# Patient Record
Sex: MALE | Race: WHITE | Employment: OTHER | URBAN - METROPOLITAN AREA
[De-identification: names, ages, dates, MRNs, and addresses within clinical notes are randomized per-mention and may not be internally consistent; named-entity substitution may affect disease eponyms.]

---

## 2017-01-02 DIAGNOSIS — N18.30 CKD (CHRONIC KIDNEY DISEASE) STAGE 3, GFR 30-59 ML/MIN (H): ICD-10-CM

## 2017-01-02 DIAGNOSIS — Z12.5 SPECIAL SCREENING FOR MALIGNANT NEOPLASM OF PROSTATE: ICD-10-CM

## 2017-01-02 DIAGNOSIS — E78.1 HYPERTRIGLYCERIDEMIA: ICD-10-CM

## 2017-01-02 DIAGNOSIS — E78.5 HYPERLIPIDEMIA LDL GOAL <100: ICD-10-CM

## 2017-01-02 LAB
ALBUMIN SERPL-MCNC: 4 G/DL (ref 3.4–5)
ALP SERPL-CCNC: 82 U/L (ref 40–150)
ALT SERPL W P-5'-P-CCNC: 36 U/L (ref 0–70)
ANION GAP SERPL CALCULATED.3IONS-SCNC: 8 MMOL/L (ref 3–14)
AST SERPL W P-5'-P-CCNC: 20 U/L (ref 0–45)
BILIRUB SERPL-MCNC: 0.4 MG/DL (ref 0.2–1.3)
BUN SERPL-MCNC: 24 MG/DL (ref 7–30)
CALCIUM SERPL-MCNC: 9.2 MG/DL (ref 8.5–10.1)
CHLORIDE SERPL-SCNC: 105 MMOL/L (ref 94–109)
CHOLEST SERPL-MCNC: 184 MG/DL
CO2 SERPL-SCNC: 29 MMOL/L (ref 20–32)
CREAT SERPL-MCNC: 1.51 MG/DL (ref 0.66–1.25)
ERYTHROCYTE [DISTWIDTH] IN BLOOD BY AUTOMATED COUNT: 13.3 % (ref 10–15)
GFR SERPL CREATININE-BSD FRML MDRD: 46 ML/MIN/1.7M2
GLUCOSE SERPL-MCNC: 104 MG/DL (ref 70–99)
HCT VFR BLD AUTO: 41.8 % (ref 40–53)
HDLC SERPL-MCNC: 67 MG/DL
HGB BLD-MCNC: 14.1 G/DL (ref 13.3–17.7)
LDLC SERPL CALC-MCNC: 92 MG/DL
MCH RBC QN AUTO: 29.5 PG (ref 26.5–33)
MCHC RBC AUTO-ENTMCNC: 33.7 G/DL (ref 31.5–36.5)
MCV RBC AUTO: 87 FL (ref 78–100)
NONHDLC SERPL-MCNC: 117 MG/DL
PLATELET # BLD AUTO: 161 10E9/L (ref 150–450)
POTASSIUM SERPL-SCNC: 4.5 MMOL/L (ref 3.4–5.3)
PROT SERPL-MCNC: 7 G/DL (ref 6.8–8.8)
PSA SERPL-ACNC: 3.37 UG/L (ref 0–4)
RBC # BLD AUTO: 4.78 10E12/L (ref 4.4–5.9)
SODIUM SERPL-SCNC: 142 MMOL/L (ref 133–144)
TRIGL SERPL-MCNC: 125 MG/DL
WBC # BLD AUTO: 6.4 10E9/L (ref 4–11)

## 2017-01-02 PROCEDURE — 36415 COLL VENOUS BLD VENIPUNCTURE: CPT | Performed by: FAMILY MEDICINE

## 2017-01-02 PROCEDURE — G0103 PSA SCREENING: HCPCS | Performed by: FAMILY MEDICINE

## 2017-01-02 PROCEDURE — 85027 COMPLETE CBC AUTOMATED: CPT | Performed by: FAMILY MEDICINE

## 2017-01-02 PROCEDURE — 80053 COMPREHEN METABOLIC PANEL: CPT | Performed by: FAMILY MEDICINE

## 2017-01-02 PROCEDURE — 80061 LIPID PANEL: CPT | Performed by: FAMILY MEDICINE

## 2017-01-02 NOTE — PROGRESS NOTES
SUBJECTIVE:                                                    Driss Pope is a 66 year old male who presents to clinic today for the following health issues:    Pt mentioned about night sweats, heavy occasionally -not certain as to cause. Medications not likely to be a cause. Only occurring occasionally and not appearing to be a large problem.Discussed with the patient.    Labs all appear in good condition.    Hyperlipidemia Follow-Up      Rate your low fat/cholesterol diet?: good    Taking statin?  Yes, no muscle aches from statin    Other lipid medications/supplements?:  Krill oil      Hypertension Follow-up      Outpatient blood pressures are being checked at home.  Results are 105-120/70-80.    Low Salt Diet: no added salt     Chronic Kidney Disease Follow-up--follows with Dr. Fitzgerald       Current NSAID use?  Yes:   ibuprofen (Motrin)  Frequency: Once a week for hip pain       Amount of exercise or physical activity: 4-5 days/week for an average of 45-60 minutes    Problems taking medications regularly: No    Medication side effects: none    Diet: regular (no restrictions)        Problem list and histories reviewed & adjusted, as indicated.  Additional history: as documented    Problem list, Medication list, Allergies, and Medical/Social/Surgical histories reviewed in [facility] and updated as appropriate.    ROS:  C: NEGATIVE for fever, chills, change in weight  CONSTITUTIONAL: Over time has lost 45 pounds which is helped his lipids.  I: NEGATIVE for worrisome rashes, moles or lesions  E: NEGATIVE for vision changes or irritation  E/M: NEGATIVE for ear, mouth and throat problems  R: NEGATIVE for significant cough or SOB  B: NEGATIVE for masses, tenderness or discharge  CV: NEGATIVE for chest pain, palpitations or peripheral edema  CV: No cardiac symptoms. Blood pressure with improved control.  GI: NEGATIVE for nausea, abdominal pain, heartburn, or change in bowel habits   male : Maintaining good urine flow  "with current use of Flomax. No changes in current status.  M: NEGATIVE for significant arthralgias or myalgia  N: NEGATIVE for weakness, dizziness or paresthesias  E: NEGATIVE for temperature intolerance, skin/hair changes  H: NEGATIVE for bleeding problems  P: NEGATIVE for changes in mood or affect    OBJECTIVE:                                                    /66 mmHg  Pulse 68  Temp(Src) 96.6  F (35.9  C) (Temporal)  Resp 12  Ht 5' 8\" (1.727 m)  Wt 223 lb (101.152 kg)  BMI 33.91 kg/m2  Body mass index is 33.91 kg/(m^2).  GENERAL: healthy, alert and no distress  GENERAL: cooperative and over weight  EYES: Eyes grossly normal to inspection, extraocular movements - intact, and PERRL  HENT: ear canals- normal; TMs- normal; Nose- normal; Mouth- no ulcers, no lesions  NECK: no tenderness, no adenopathy, no asymmetry, no masses, no stiffness; thyroid- normal to palpation  NECK: no bruits  RESP: lungs clear to auscultation - no rales, no rhonchi, no wheezes  BREAST: no masses, no tenderness, no nipple discharge, no palpable axillary masses or adenopathy  CV: regular rates and rhythm, normal S1 S2, no S3 or S4 and no murmur, no click or rub -  ABDOMEN: soft, no tenderness, no  hepatosplenomegaly, no masses, normal bowel sounds  MS: extremities- no gross deformities noted, no edema  MS: peripheral pulses normal  SKIN: no suspicious lesions, no rashes  NEURO: strength and tone- normal, sensory exam- grossly normal, mentation- intact, speech- normal, reflexes- symmetric  BACK: no CVA tenderness, no paralumbar tenderness  - male: testicles- normal, no atrophy, no masses;  no inguinal hernias  Rectal- male: prostate symmetric w/o nodularity, no masses palpated and prostate 3+  PSYCH: Alert and oriented times 3; speech- coherent , normal rate and volume; able to articulate logical thoughts, able to abstract reason, no tangential thoughts, no hallucinations or delusions, affect- normal  LYMPHATICS: ant. cervical- " normal, post. cervical- normal, axillary- normal, supraclavicular- normal, inguinal- normal    Diagnostic test results:  Diagnostic Test Results:  Results for orders placed or performed in visit on 01/02/17   Prostate spec antigen screen   Result Value Ref Range    PSA 3.37 0 - 4 ug/L   CBC with platelets   Result Value Ref Range    WBC 6.4 4.0 - 11.0 10e9/L    RBC Count 4.78 4.4 - 5.9 10e12/L    Hemoglobin 14.1 13.3 - 17.7 g/dL    Hematocrit 41.8 40.0 - 53.0 %    MCV 87 78 - 100 fl    MCH 29.5 26.5 - 33.0 pg    MCHC 33.7 31.5 - 36.5 g/dL    RDW 13.3 10.0 - 15.0 %    Platelet Count 161 150 - 450 10e9/L   Lipid panel reflex to direct LDL   Result Value Ref Range    Cholesterol 184 <200 mg/dL    Triglycerides 125 <150 mg/dL    HDL Cholesterol 67 >39 mg/dL    LDL Cholesterol Calculated 92 <100 mg/dL    Non HDL Cholesterol 117 <130 mg/dL   Comprehensive metabolic panel   Result Value Ref Range    Sodium 142 133 - 144 mmol/L    Potassium 4.5 3.4 - 5.3 mmol/L    Chloride 105 94 - 109 mmol/L    Carbon Dioxide 29 20 - 32 mmol/L    Anion Gap 8 3 - 14 mmol/L    Glucose 104 (H) 70 - 99 mg/dL    Urea Nitrogen 24 7 - 30 mg/dL    Creatinine 1.51 (H) 0.66 - 1.25 mg/dL    GFR Estimate 46 (L) >60 mL/min/1.7m2    GFR Estimate If Black 56 (L) >60 mL/min/1.7m2    Calcium 9.2 8.5 - 10.1 mg/dL    Bilirubin Total 0.4 0.2 - 1.3 mg/dL    Albumin 4.0 3.4 - 5.0 g/dL    Protein Total 7.0 6.8 - 8.8 g/dL    Alkaline Phosphatase 82 40 - 150 U/L    ALT 36 0 - 70 U/L    AST 20 0 - 45 U/L        ASSESSMENT/PLAN:                                                      (I10) Hypertension, goal below 140/90  (primary encounter diagnosis)  Comment:  Blood pressure appears to be well maintained.  Plan: losartan (COZAAR) 50 MG tablet, metoprolol         (TOPROL-XL) 50 MG 24 hr tablet            (N18.3) CKD (chronic kidney disease) stage 3, GFR 30-59 ml/min  Comment: Stable except for slight increase in serum creatinine with this particular blood work. Is  followed by Dr. Fitzgerald for ongoing nephrology management.  Plan: losartan (COZAAR) 50 MG tablet, atorvastatin         (LIPITOR) 20 MG tablet            (E78.5) Hyperlipidemia LDL goal <100  Comment:  LDL continues to be at goal with current medications  Plan: atorvastatin (LIPITOR) 20 MG tablet            (E78.1) Hypertriglyceridemia  Comment: Much improved with overall weight loss  Plan:     (N40.1) Benign non-nodular prostatic hyperplasia with lower urinary tract symptoms  Comment: Stable with maximum dose of Flomax.  Plan:  No changes with a stable PSA    (Z23) Need for prophylactic vaccination and inoculation against influenza  Comment: Vaccination given today.  Plan: FLU VACCINE, INCREASED ANTIGEN, PRESV FREE, AGE        65+ [04149], ADMIN INFLUENZA[] (For         MEDICARE Patients ONLY)               Follow up with Provider - Recommend follow-up in 6 months with fasting blood studies. No major changes in medications at this time.   See Patient Instructions  The patient understood the rational for the diagnosis and treatment plan. All questions were answered to best of my ability and the patient's satisfaction.      Vinod Hercules MD  [facility]

## 2017-01-04 ENCOUNTER — OFFICE VISIT (OUTPATIENT)
Dept: FAMILY MEDICINE | Facility: CLINIC | Age: 67
End: 2017-01-04
Payer: COMMERCIAL

## 2017-01-04 VITALS
WEIGHT: 223 LBS | TEMPERATURE: 96.6 F | SYSTOLIC BLOOD PRESSURE: 126 MMHG | HEART RATE: 68 BPM | DIASTOLIC BLOOD PRESSURE: 66 MMHG | BODY MASS INDEX: 33.8 KG/M2 | HEIGHT: 68 IN | RESPIRATION RATE: 12 BRPM

## 2017-01-04 DIAGNOSIS — N18.30 CKD (CHRONIC KIDNEY DISEASE) STAGE 3, GFR 30-59 ML/MIN (H): ICD-10-CM

## 2017-01-04 DIAGNOSIS — I10 HYPERTENSION, GOAL BELOW 140/90: Primary | ICD-10-CM

## 2017-01-04 DIAGNOSIS — Z23 NEED FOR PROPHYLACTIC VACCINATION AND INOCULATION AGAINST INFLUENZA: ICD-10-CM

## 2017-01-04 DIAGNOSIS — E78.5 HYPERLIPIDEMIA LDL GOAL <100: ICD-10-CM

## 2017-01-04 DIAGNOSIS — E78.1 HYPERTRIGLYCERIDEMIA: ICD-10-CM

## 2017-01-04 DIAGNOSIS — N40.1 BENIGN NON-NODULAR PROSTATIC HYPERPLASIA WITH LOWER URINARY TRACT SYMPTOMS: ICD-10-CM

## 2017-01-04 PROCEDURE — 90662 IIV NO PRSV INCREASED AG IM: CPT | Performed by: FAMILY MEDICINE

## 2017-01-04 PROCEDURE — G0008 ADMIN INFLUENZA VIRUS VAC: HCPCS | Performed by: FAMILY MEDICINE

## 2017-01-04 PROCEDURE — 99214 OFFICE O/P EST MOD 30 MIN: CPT | Mod: 25 | Performed by: FAMILY MEDICINE

## 2017-01-04 RX ORDER — ATORVASTATIN CALCIUM 20 MG/1
20 TABLET, FILM COATED ORAL DAILY
Qty: 90 TABLET | Refills: 3 | Status: SHIPPED | OUTPATIENT
Start: 2017-01-04 | End: 2018-01-30

## 2017-01-04 RX ORDER — LOSARTAN POTASSIUM 50 MG/1
50 TABLET ORAL DAILY
Qty: 90 TABLET | Refills: 3 | Status: SHIPPED | OUTPATIENT
Start: 2017-01-04 | End: 2018-01-30

## 2017-01-04 RX ORDER — METOPROLOL SUCCINATE 50 MG/1
50 TABLET, EXTENDED RELEASE ORAL DAILY
Qty: 90 TABLET | Refills: 3 | Status: SHIPPED | OUTPATIENT
Start: 2017-01-04 | End: 2017-05-24 | Stop reason: DRUGHIGH

## 2017-01-04 ASSESSMENT — PAIN SCALES - GENERAL: PAINLEVEL: MODERATE PAIN (4)

## 2017-01-04 NOTE — PROGRESS NOTES
Prior to injection verified patient identity using patient's name and date of birth.    Injectable Influenza Immunization Documentation    1.  Is the person to be vaccinated sick today?  No    2. Does the person to be vaccinated have an allergy to eggs or to a component of the vaccine?  No    3. Has the person to be vaccinated today ever had a serious reaction to influenza vaccine in the past?  No    4. Has the person to be vaccinated ever had Guillain-Clarkson syndrome?  No     Form completed by Golden Knight MA

## 2017-01-04 NOTE — NURSING NOTE
"Chief Complaint   Patient presents with     RECHECK     Panel Management     Flu shot, Fall risk       Initial /66 mmHg  Pulse 68  Temp(Src) 96.6  F (35.9  C) (Temporal)  Resp 12  Ht 5' 8\" (1.727 m)  Wt 223 lb (101.152 kg)  BMI 33.91 kg/m2 Estimated body mass index is 33.91 kg/(m^2) as calculated from the following:    Height as of this encounter: 5' 8\" (1.727 m).    Weight as of this encounter: 223 lb (101.152 kg).  BP completed using cuff size: lloyd Knight MA    "

## 2017-01-04 NOTE — MR AVS SNAPSHOT
After Visit Summary   1/4/2017    Driss Pope    MRN: [id number]           Patient Information     Date Of Birth          1950        Visit Information        Provider Department      1/4/2017 8:40 AM Vinod Hercules MD [facility]        Today's Diagnoses     Hypertension, goal below 140/90    -  1     CKD (chronic kidney disease) stage 3, GFR 30-59 ml/min         Hyperlipidemia LDL goal <100         Hypertriglyceridemia         Benign non-nodular prostatic hyperplasia with lower urinary tract symptoms           Care Instructions    These are new changes to your current plan of care based on today's visit:    Medications stopped    Medications to be started    Change dose of this medication Tablet size changed as discussed   New treatments        Follow up appointments:    1.  FOLLOW UP WITH YOUR PRIMARY CARE PROVIDER: 6 month(s) for clinic visit with fasting blood work     Colonoscopy as planned in March 2017 as planned    Vinod Hercules MD              Follow-ups after your visit        Follow-up notes from your care team     Return in about 6 months (around 7/4/2017) for Routine Visit, Lab Work.      Your next 10 appointments already scheduled     Oct 10, 2017  9:00 AM   LAB with LAB FIRST FLOOR [facility] ([facility])    [address] [place] [id number] [phone]           Patient must bring picture ID.  Patient should be prepared to give a urine specimen  Please do not eat 10-12 hours before your appointment if you are coming in fasting for labs on lipids, cholesterol, or glucose (sugar).  Pregnant women should follow their Care Team instructions. Water with medications is okay. Do not drink coffee or other fluids.   If you have concerns about taking  your medications, please ask at office or if scheduling via F[employer] Skis, send a message by clicking on Secure Messaging, Message Your Care Team.            Oct 10,  "2017  9:30 AM   Return Visit with Rosalie Fitzgerald MD   [facility] ([facility])    [address] [address] [place] [id number] [phone]              Who to contact     If you have questions or need follow up information about today's clinic visit or your schedule please contact [facility] directly at [phone].  Normal or non-critical lab and imaging results will be communicated to you by Kruxhart, letter or phone within 4 business days after the clinic has received the results. If you do not hear from us within 7 days, please contact the clinic through Bebitost or phone. If you have a critical or abnormal lab result, we will notify you by phone as soon as possible.  Submit refill requests through [facility] or call your pharmacy and they will forward the refill request to us. Please allow 3 business days for your refill to be completed.          Additional Information About Your Visit        Kruxhar[employer]iya Information     [facility] gives you secure access to your electronic health record. If you see a primary care provider, you can also send messages to your care team and make appointments. If you have questions, please call your primary care clinic.  If you do not have a primary care provider, please call [phone] and they will assist you.        Care EveryWhere ID     This is your Care EveryWhere ID. This could be used by other organizations to access your [place] medical records  [id number]        Your Vitals Were     Pulse Temperature Respirations Height BMI (Body Mass Index)       68 96.6  F (35.9  C) (Temporal) 12 5' 8\" (1.727 m) 33.91 kg/m2        Blood Pressure from Last 3 Encounters:   01/04/17 126/66   12/09/16 132/75   10/11/16 130/76    Weight from Last 3 Encounters:   01/04/17 223 lb (101.152 kg)   10/11/16 217 lb (98.431 kg)   06/30/16 220 lb (99.791 kg)              Today, you had the following     No orders found for display         Today's " Medication Changes          These changes are accurate as of: 1/4/17  9:21 AM.  If you have any questions, ask your nurse or doctor.               These medicines have changed or have updated prescriptions.        Dose/Directions    atorvastatin 20 MG tablet   Commonly known as:  LIPITOR   This may have changed:  medication strength   Used for:  Hyperlipidemia LDL goal <100, CKD (chronic kidney disease) stage 3, GFR 30-59 ml/min   Changed by:  Vinod Hercules MD        Dose:  20 mg   Take 1 tablet (20 mg) by mouth daily   Quantity:  90 tablet   Refills:  3       losartan 50 MG tablet   Commonly known as:  COZAAR   This may have changed:  medication strength   Used for:  Hypertension, goal below 140/90, CKD (chronic kidney disease) stage 3, GFR 30-59 ml/min   Changed by:  Vinod Hercules MD        Dose:  50 mg   Take 1 tablet (50 mg) by mouth daily   Quantity:  90 tablet   Refills:  3       metoprolol 50 MG 24 hr tablet   Commonly known as:  TOPROL-XL   This may have changed:  medication strength   Used for:  Hypertension, goal below 140/90   Changed by:  Vinod Hercules MD        Dose:  50 mg   Take 1 tablet (50 mg) by mouth daily   Quantity:  90 tablet   Refills:  3            Where to get your medicines      These medications were sent to [facility] - [facility] [address]  [address] [address]     Phone:  [phone]    - atorvastatin 20 MG tablet  - losartan 50 MG tablet  - metoprolol 50 MG 24 hr tablet             Primary Care Provider Office Phone # Fax #    Vinod Hercules [phone]       [facility] [address] [address] [facility] [address]        Thank you!     Thank you for choosing [facility]  for your care. Our goal is always to provide you with excellent care. Hearing back from our patients is one way we can continue to improve our services. Please take a few minutes to complete the  written survey that you may receive in the mail after your visit with us. Thank you!             Your Updated Medication List - Protect others around you: Learn how to safely use, store and throw away your medicines at www.disposemymeds.org.          This list is accurate as of: 1/4/17  9:21 AM.  Always use your most recent med list.                   Brand Name Dispense Instructions for use    aspirin 325 MG EC tablet     100 tablet    Take 1 tablet by mouth daily.       atorvastatin 20 MG tablet    LIPITOR    90 tablet    Take 1 tablet (20 mg) by mouth daily       CIALIS 20 MG tablet   Generic drug:  tadalafil     9 tablet    Take 1/2 to 1 tablet by  mouth daily as needed,  never use with  nitroglycerin, terazosin,  or doxazosin       GLUCOSAMINE CHONDROITIN ADV PO      2-3 tablets daily.       Krill Oil 1000 MG Caps      Take by mouth daily       losartan 50 MG tablet    COZAAR    90 tablet    Take 1 tablet (50 mg) by mouth daily       metoprolol 50 MG 24 hr tablet    TOPROL-XL    90 tablet    Take 1 tablet (50 mg) by mouth daily       MULTIVITAL PO      Once daily.       order for DME     1 Units    Equipment being ordered: Home blood pressure monitoring device--patient choice of models. Recommend a model with an arm cuff for testing.       tamsulosin 0.4 MG capsule    FLOMAX    180 capsule    TAKE 2 CAPSULES EVERY DAY

## 2017-01-04 NOTE — PATIENT INSTRUCTIONS
These are new changes to your current plan of care based on today's visit:    Medications stopped    Medications to be started    Change dose of this medication Tablet size changed as discussed   New treatments        Follow up appointments:    1.  FOLLOW UP WITH YOUR PRIMARY CARE PROVIDER: 6 month(s) for clinic visit with fasting blood work     Colonoscopy as planned in March 2017 as planned    Vinod Hercules MD

## 2017-01-09 ENCOUNTER — TELEPHONE (OUTPATIENT)
Dept: FAMILY MEDICINE | Facility: CLINIC | Age: 67
End: 2017-01-09

## 2017-01-09 DIAGNOSIS — H10.33 ACUTE CONJUNCTIVITIS OF BOTH EYES: Primary | ICD-10-CM

## 2017-01-09 RX ORDER — POLYMYXIN B SULFATE AND TRIMETHOPRIM 1; 10000 MG/ML; [USP'U]/ML
1 SOLUTION OPHTHALMIC EVERY 4 HOURS
Qty: 1 BOTTLE | Refills: 0 | Status: SHIPPED | OUTPATIENT
Start: 2017-01-09 | End: 2017-01-13

## 2017-01-09 NOTE — TELEPHONE ENCOUNTER
Reason for call:  Symptom  Reason for call:  Patient reporting a symptom    Symptom or request: Pink eye ?     Duration (how long have symptoms been present): Started 1/8/17    Have you been treated for this before? No    Additional comments:Wanted to know if something could be sent to the    [email or web address] [id number] [place] [address] [place] [address]        Phone Number patient can be reached at:  Home number on file [phone] (home)    Best Time:  Anytime     Can we leave a detailed message on this number:  YES    Call taken on 1/9/2017 at 8:31 AM by Delilah Claros

## 2017-01-13 ENCOUNTER — OFFICE VISIT (OUTPATIENT)
Dept: FAMILY MEDICINE | Facility: CLINIC | Age: 67
End: 2017-01-13
Payer: COMMERCIAL

## 2017-01-13 ENCOUNTER — TELEPHONE (OUTPATIENT)
Dept: FAMILY MEDICINE | Facility: CLINIC | Age: 67
End: 2017-01-13

## 2017-01-13 VITALS
HEIGHT: 67 IN | DIASTOLIC BLOOD PRESSURE: 72 MMHG | TEMPERATURE: 97.3 F | RESPIRATION RATE: 16 BRPM | WEIGHT: 219.7 LBS | HEART RATE: 64 BPM | BODY MASS INDEX: 34.48 KG/M2 | SYSTOLIC BLOOD PRESSURE: 136 MMHG

## 2017-01-13 DIAGNOSIS — H10.33 ACUTE CONJUNCTIVITIS OF BOTH EYES, UNSPECIFIED ACUTE CONJUNCTIVITIS TYPE: Primary | ICD-10-CM

## 2017-01-13 PROCEDURE — 99213 OFFICE O/P EST LOW 20 MIN: CPT | Performed by: NURSE PRACTITIONER

## 2017-01-13 RX ORDER — SULFACETAMIDE SODIUM 100 MG/ML
1 SOLUTION/ DROPS OPHTHALMIC 4 TIMES DAILY
Qty: 2 ML | Refills: 0 | Status: SHIPPED | OUTPATIENT
Start: 2017-01-13 | End: 2017-01-20

## 2017-01-13 ASSESSMENT — PAIN SCALES - GENERAL: PAINLEVEL: NO PAIN (0)

## 2017-01-13 NOTE — PROGRESS NOTES
"  SUBJECTIVE:                                                    Driss Pope is a 66 year old male who presents to clinic today for the following health issues:    Eye(s) Problem     Onset: x 5 days     Description:   Location: bilateral  Pain: no  Redness: YES    Accompanying Signs & Symptoms:  Discharge/mattering: YES  Swelling: no  Visual changes: YES  Fever: no  Nasal Congestion: YES  Bothered by bright lights: no     History:   Trauma: no   Foreign body exposure: no    Precipitating factors:   Wearing contacts: Yes    Alleviating factors:  Improved by: none       Therapies Tried and outcome: polytrim      The patient reports he has been experiencing symptoms of conjunctivitis since Sunday 1/8. He reports mild nasal congestion and sore throat. He states the outsides of his eyes are itching. The patient states he has been dabbing his eyes due to drainage. The patient notes he has been taking Polytrim drops with no relief. He states his eyes continue to be \"filmy\" and he wakes up with crust in the corners of his eyes. The patient states he has had conjunctivitis in the past and has not had allergic reactions to other optic solutions.     Problem list and histories reviewed & adjusted, as indicated.  Additional history: as documented    Patient Active Problem List   Diagnosis     Hyperlipidemia LDL goal <100     Hypertriglyceridemia     CKD (chronic kidney disease) stage 3, GFR 30-59 ml/min     Advanced directives, counseling/discussion     Abnormal PSA     Lower extremity edema     ED (erectile dysfunction)     Benign non-nodular prostatic hyperplasia with lower urinary tract symptoms     Hypertension, goal below 140/90     Past Surgical History   Procedure Laterality Date     Joint replacement, hip rt/lt       Joint Replacement Hip RT/LT-both replced in the last 5 years     Arthroscopy knee rt/lt       Left knee     Tonsillectomy       Colonoscopy  10/4/2011     Procedure:COLONOSCOPY; Colonoscopy, screening; " Surgeon:HANNAH COMER; Location:[facility]     Colonoscopy  10/4/2011     Procedure:COMBINED COLONOSCOPY, REMOVE TUMOR/POLYP/LESION BY SNARE; Surgeon:HANNAH COMER; Location:[facility]     Colonoscopy with co2 insufflation N/A 12/9/2016     Procedure: COLONOSCOPY WITH CO2 INSUFFLATION;  Surgeon: Duane, William Charles, MD;  Location: [facility] OR     Colonoscopy N/A 12/9/2016     Procedure: COMBINED COLONOSCOPY, SINGLE OR MULTIPLE BIOPSY/POLYPECTOMY BY BIOPSY;  Surgeon: Duane, William Charles, MD;  Location: [facility] OR       Social History   Substance Use Topics     Smoking status: Never Smoker      Smokeless tobacco: Never Used     Alcohol Use: Yes      Comment: occasionally     Family History   Problem Relation Age of Onset     Alzheimer Disease Mother      Hypertension Mother      HEART DISEASE Father      CHF     Alzheimer Disease Paternal Grandmother      Alzheimer Disease Paternal Grandfather      CANCER Brother      esophageal cancer     Prostate Cancer Brother      Prostate Cancer Brother      Asthma No family hx of      C.A.D. No family hx of      CEREBROVASCULAR DISEASE No family hx of      Breast Cancer No family hx of      Cancer - colorectal No family hx of      Alcohol/Drug No family hx of      Allergies No family hx of      Anesthesia Reaction No family hx of      Arthritis No family hx of      Blood Disease No family hx of      Cardiovascular No family hx of      Circulatory No family hx of      Congenital Anomalies No family hx of      Connective Tissue Disorder No family hx of      Depression No family hx of      Endocrine Disease No family hx of      Genetic Disorder No family hx of      Eye Disorder No family hx of      GASTROINTESTINAL DISEASE No family hx of      Genitourinary Problems No family hx of      Gynecology No family hx of      Lipids No family hx of      Musculoskeletal Disorder No family hx of      Neurologic Disorder No family hx of      Obesity No family hx of      OSTEOPOROSIS No family hx  of      Psychotic Disorder No family hx of      Respiratory No family hx of      Thyroid Disease No family hx of      Family History Negative No family hx of      Hearing Loss No family hx of      Substance Abuse No family hx of      MENTAL ILLNESS No family hx of      Anxiety Disorder No family hx of      Hyperlipidemia No family hx of      DIABETES Sister      Unknown/Adopted Son      adopted         Current Outpatient Prescriptions   Medication Sig Dispense Refill     trimethoprim-polymyxin b (POLYTRIM) ophthalmic solution Place 1 drop into both eyes every 4 hours for 7 days 1 Bottle 0     losartan (COZAAR) 50 MG tablet Take 1 tablet (50 mg) by mouth daily 90 tablet 3     atorvastatin (LIPITOR) 20 MG tablet Take 1 tablet (20 mg) by mouth daily 90 tablet 3     metoprolol (TOPROL-XL) 50 MG 24 hr tablet Take 1 tablet (50 mg) by mouth daily 90 tablet 3     tamsulosin (FLOMAX) 0.4 MG 24 hr capsule TAKE 2 CAPSULES EVERY  capsule 1     Krill Oil 1000 MG CAPS Take by mouth daily       Misc Natural Products (GLUCOSAMINE CHONDROITIN ADV PO) 2-3 tablets daily.        Multiple Vitamins-Minerals (MULTIVITAL PO) Once daily.        CIALIS 20 MG tablet Take 1/2 to 1 tablet by  mouth daily as needed,  never use with  nitroglycerin, terazosin,  or doxazosin 9 tablet 0     order for DME Equipment being ordered: Home blood pressure monitoring device--patient choice of models. Recommend a model with an arm cuff for testing. 1 Units 0     aspirin 325 MG EC tablet Take 1 tablet by mouth daily. 100 tablet 3     Problem list, Medication list, Allergies, and Medical/Social/Surgical histories reviewed in EPIC and updated as appropriate.    ROS:  Constitutional, neuro, ENT, endocrine, pulmonary, cardiac, gastrointestinal, genitourinary, musculoskeletal, integument and psychiatric systems are negative, except as otherwise noted.    OBJECTIVE:                                                    /72 mmHg  Pulse 64  Temp(Src) 97.3  " F (36.3  C) (Temporal)  Resp 16  Ht 1.712 m (5' 7.4\")  Wt 99.655 kg (219 lb 11.2 oz)  BMI 34.00 kg/m2  Body mass index is 34 kg/(m^2).  GENERAL APPEARANCE: healthy, alert and no distress  EYES: bilateral conjunctival injection, slightly purulent drainage left greater than right, lids look swollen and dry, minimal tenderness   HENT: ear canals and TM's normal and nose and mouth without ulcers or lesions  NECK: no adenopathy, no asymmetry, masses, or scars and thyroid normal to palpation  RESP: lungs clear to auscultation - no rales, rhonchi or wheezes  CV: regular rates and rhythm, normal S1 S2, no S3 or S4 and no murmur, click or rub  SKIN: no suspicious lesions or rashes  NEURO: Normal strength and tone, mentation intact and speech normal    Diagnostic test results:  No results found for this or any previous visit (from the past 24 hour(s)).     ASSESSMENT/PLAN:                                                        ICD-10-CM    1. Acute conjunctivitis of both eyes, unspecified acute conjunctivitis type H10.33 sulfacetamide (BLEPH-10) 10 % ophthalmic solution            Discussed conjunctivitis symptoms and current use of Polytrim. Reviewed possible allergy to Polytrim. Decision made to cease using Polytrim and begin new ophthalmic solution. Order placed for sulfacetamide ophthalmic solution. Medication direction, dosage, and side effects discussed with patient. Advised patient to apply hydrocortisone to skin surrounding eyes, and avoid rubbing as irritation is likely coming from continued rubbing. Recommended patient use eye protection when outside.    Follow up with Provider - Monday if not improved      JOSUE Escamilla [facility]    This document serves as a record of the services and decisions personally performed and made by Carolin Mensha DNP. It was created on her behalf by Yajaira Ross, a trained medical scribe. The creation of this document is based the provider's statements to " the medical scribe.  Yajaira Ross 2:25 PM January 13, 2017

## 2017-01-13 NOTE — TELEPHONE ENCOUNTER
Patient is coming in today 1/13/17 seeing Carolin Mensah for ongoing pink eye at 2:20 pm.  Please triage.  Peg Rey, CMA

## 2017-01-13 NOTE — TELEPHONE ENCOUNTER
Left message on machine to call back. Patient already treated on 1/9/17, needs OV.  Carolin Muro, RN, BSN

## 2017-02-17 ENCOUNTER — DOCUMENTATION ONLY (OUTPATIENT)
Dept: VASCULAR SURGERY | Facility: CLINIC | Age: 67
End: 2017-02-17

## 2017-03-07 RX ORDER — LIDOCAINE 40 MG/G
CREAM TOPICAL
Status: CANCELLED | OUTPATIENT
Start: 2017-03-07

## 2017-03-07 RX ORDER — ONDANSETRON 2 MG/ML
4 INJECTION INTRAMUSCULAR; INTRAVENOUS
Status: CANCELLED | OUTPATIENT
Start: 2017-03-07

## 2017-03-15 ENCOUNTER — SURGERY (OUTPATIENT)
Age: 67
End: 2017-03-15

## 2017-03-15 ENCOUNTER — HOSPITAL ENCOUNTER (OUTPATIENT)
Facility: AMBULATORY SURGERY CENTER | Age: 67
Discharge: HOME | End: 2017-03-15
Attending: INTERNAL MEDICINE | Admitting: INTERNAL MEDICINE
Payer: COMMERCIAL

## 2017-03-15 VITALS
RESPIRATION RATE: 16 BRPM | TEMPERATURE: 97 F | OXYGEN SATURATION: 92 % | DIASTOLIC BLOOD PRESSURE: 89 MMHG | SYSTOLIC BLOOD PRESSURE: 137 MMHG

## 2017-03-15 LAB — COLONOSCOPY: NORMAL

## 2017-03-15 PROCEDURE — 88305 TISSUE EXAM BY PATHOLOGIST: CPT | Performed by: INTERNAL MEDICINE

## 2017-03-15 PROCEDURE — 45385 COLONOSCOPY W/LESION REMOVAL: CPT

## 2017-03-15 PROCEDURE — G8907 PT DOC NO EVENTS ON DISCHARG: HCPCS

## 2017-03-15 PROCEDURE — G8918 PT W/O PREOP ORDER IV AB PRO: HCPCS

## 2017-03-15 RX ORDER — FENTANYL CITRATE 50 UG/ML
INJECTION, SOLUTION INTRAMUSCULAR; INTRAVENOUS PRN
Status: DISCONTINUED | OUTPATIENT
Start: 2017-03-15 | End: 2017-03-15 | Stop reason: HOSPADM

## 2017-03-15 RX ADMIN — FENTANYL CITRATE 100 MCG: 50 INJECTION, SOLUTION INTRAMUSCULAR; INTRAVENOUS at 08:34

## 2017-03-16 LAB — COPATH REPORT: NORMAL

## 2017-03-25 DIAGNOSIS — N40.1 BENIGN NON-NODULAR PROSTATIC HYPERPLASIA WITH LOWER URINARY TRACT SYMPTOMS: ICD-10-CM

## 2017-03-27 RX ORDER — TAMSULOSIN HYDROCHLORIDE 0.4 MG/1
CAPSULE ORAL
Qty: 180 CAPSULE | Refills: 2 | Status: SHIPPED | OUTPATIENT
Start: 2017-03-27 | End: 2018-01-30

## 2017-03-27 NOTE — TELEPHONE ENCOUNTER
Prescription approved per [facility] Refill Protocol.  Adam Penny RN

## 2017-03-27 NOTE — TELEPHONE ENCOUNTER
Tamsulosin 0.4 mg         Last Written Prescription Date: 11/4/2016  Last Fill Quantity: 180, # refills: 1    Last Office Visit with G, P or [facility] prescribing provider:  1/13/2017     Future Office Visit:      BP Readings from Last 3 Encounters:   03/15/17 137/89   01/13/17 136/72   01/04/17 126/66

## 2017-05-22 NOTE — PROGRESS NOTES
"  SUBJECTIVE:                                                    Driss Pope is a 67 year old male who presents to clinic today for the following health issues:  Pt stopped blood pressure medication 6 weeks ago due to low blood pressures and pulse. Has been running 108-130s/57-80s.     Joint Pain--       This patient has had persistent pain over the last month overlying the right greater trochanteric area of his right hip. He has had bilateral hip replacement surgeries. Pain is noted with pressure on the greater trochanteric bursa. Radiation into the surrounding area noted but no suggestion of radiculopathy or lower back pain. He notices a good deal of pain when he has a twisting motion on his right hip, specifically was playing golf. No paresthesias.        Onset: x 1 month    Description:   Location: right hip  Character: \"tender\", throbbing, stabbing at times    Intensity: 8/10 at worst    Progression of Symptoms: depends on activity    Accompanying Signs & Symptoms:  Other symptoms: none   History:   Previous similar pain: YES      Precipitating factors:   Trauma or overuse: no     Alleviating factors:  Improved by: rest, Aspercreme, Advil       Therapies Tried and outcome: tylenol, Advil, Aspercreme      Hypertension Follow-up      Outpatient blood pressures are being checked at home.  Results are under 125/80 and many times near 110 systolic. Patient was having some mild orthostatic symptoms at home. He discontinued Metoprolol due to the low blood pressure and bradycardia.. Blood pressures and then stable for the last 6 weeks.    Low Salt Diet: no added salt       Problem list and histories reviewed & adjusted, as indicated.  Additional history: as documented        Reviewed and updated as needed this visit by clinical staff       Reviewed and updated as needed this visit by Provider         ROS:   ROS: 10 point ROS neg other than the symptoms noted above in the HPI.      OBJECTIVE:                         " "                           /64 (BP Location: Left arm, Patient Position: Chair, Cuff Size: Adult Large)  Pulse 54  Temp 97.7  F (36.5  C) (Temporal)  Resp 14  Ht 5' 7.6\" (1.717 m)  Wt 224 lb 4.8 oz (101.7 kg)  BMI 34.51 kg/m2  Body mass index is 34.51 kg/(m^2).  GENERAL: healthy, alert and no distress  HENT: ear canals- normal; TMs- normal; Nose- normal; Mouth- no ulcers, no lesions  NECK: no tenderness, no adenopathy, no asymmetry, no masses, no stiffness; thyroid- normal to palpation  RESP: lungs clear to auscultation - no rales, no rhonchi, no wheezes  CV: regular rates and rhythm, normal S1 S2, no S3 or S4 and no murmur, no click or rub -  ABDOMEN: soft, no tenderness, no  hepatosplenomegaly, no masses, normal bowel sounds  MS: Tenderness noted over the right greater trochanter without soft tissue swelling. Range of motion of the hip appears normal. Circulatory status normal. No tenderness or positive findings in the lumbar spine.  SKIN: no suspicious lesions, no rashes  NEURO: strength and tone- normal, sensory exam- grossly normal, mentation- intact, speech- normal, reflexes- symmetric  PSYCH: Alert and oriented times 3; speech- coherent , normal rate and volume; able to articulate logical thoughts, able to abstract reason, no tangential thoughts, no hallucinations or delusions, affect- normal    Diagnostic test results:  Diagnostic Test Results:  none      ASSESSMENT/PLAN:                                                    1. Trochanteric bursitis of right hip    We'll try a short course of oral steroids to reduce inflammation. If not responding, he should follow-up with his orthopedic specialist for possible injection and assessment of the hip prosthesis.  - prednisone (DELTASONE) 20 MG tablet; Take 3 tabs (60 mg) by mouth daily x 3 days, 2 tabs (40 mg) daily x 3 days, 1 tab (20 mg) daily x 3 days, then 1/2 tab (10 mg) x 3 days.  Dispense: 20 tablet; Refill: 0      Follow up with Provider - " Follow-up in mid July for scheduled follow-up and monitoring her renal status and blood pressure.   See Patient Instructions    The patient understood the rational for the diagnosis and treatment plan. All questions were answered to best of my ability and the patient's satisfaction.      Vinod Hercules MD  [facility]

## 2017-05-24 ENCOUNTER — OFFICE VISIT (OUTPATIENT)
Dept: FAMILY MEDICINE | Facility: CLINIC | Age: 67
End: 2017-05-24
Payer: MEDICARE

## 2017-05-24 VITALS
DIASTOLIC BLOOD PRESSURE: 64 MMHG | TEMPERATURE: 97.7 F | RESPIRATION RATE: 14 BRPM | HEIGHT: 68 IN | HEART RATE: 54 BPM | WEIGHT: 224.3 LBS | SYSTOLIC BLOOD PRESSURE: 132 MMHG | BODY MASS INDEX: 33.99 KG/M2

## 2017-05-24 DIAGNOSIS — M70.61 TROCHANTERIC BURSITIS OF RIGHT HIP: Primary | ICD-10-CM

## 2017-05-24 PROCEDURE — 99213 OFFICE O/P EST LOW 20 MIN: CPT | Performed by: FAMILY MEDICINE

## 2017-05-24 RX ORDER — PREDNISONE 20 MG/1
TABLET ORAL
Qty: 20 TABLET | Refills: 0 | Status: SHIPPED | OUTPATIENT
Start: 2017-05-24 | End: 2017-10-10

## 2017-05-24 ASSESSMENT — PAIN SCALES - GENERAL: PAINLEVEL: MILD PAIN (3)

## 2017-05-24 NOTE — PATIENT INSTRUCTIONS
These are new changes to your current plan of care based on today's visit:    Medications stopped    Medications to be started Prednisone tapering course as planned   Change dose of this medication    New treatments        Follow up appointments:    1.  FOLLOW UP WITH YOUR PRIMARY CARE PROVIDER: 6 week(s) for hypertension follow up with fasting blood work     2. FOLLOW UP WITH SPECIALIST: Orthopedist if the hip does not improve    Vinod Hercules MD

## 2017-05-24 NOTE — MR AVS SNAPSHOT
After Visit Summary   5/24/2017    Driss Ppoe    MRN: [id number]           Patient Information     Date Of Birth          1950        Visit Information        Provider Department      5/24/2017 2:40 PM Vinod Hercules MD [facility]        Today's Diagnoses     Trochanteric bursitis of right hip    -  1      Care Instructions    These are new changes to your current plan of care based on today's visit:    Medications stopped    Medications to be started Prednisone tapering course as planned   Change dose of this medication    New treatments        Follow up appointments:    1.  FOLLOW UP WITH YOUR PRIMARY CARE PROVIDER: 6 week(s) for hypertension follow up with fasting blood work     2. FOLLOW UP WITH SPECIALIST: Orthopedist if the hip does not improve    Vinod Hercules MD                Follow-ups after your visit        Follow-up notes from your care team     Return in about 6 weeks (around 7/5/2017) for Routine Visit, Lab Work.      Your next 10 appointments already scheduled     Oct 10, 2017  9:00 AM CDT   LAB with [facility])    [address] [place] [id number] [phone]           Patient must bring picture ID.  Patient should be prepared to give a urine specimen  Please do not eat 10-12 hours before your appointment if you are coming in fasting for labs on lipids, cholesterol, or glucose (sugar).  Pregnant women should follow their Care Team instructions. Water with medications is okay. Do not drink coffee or other fluids.   If you have concerns about taking  your medications, please ask at office or if scheduling via [email or web address], send a message by clicking on Secure Messaging, Message Your Care Team.            Oct 10, 2017  9:30 AM CDT   Return Visit with Rosalie Fitzgerald MD   [facility])    [id number] [place] [address]  [phone]              Who to contact     If you have questions or need follow up information about today's clinic visit or your schedule please contact [facility] directly at [phone].  Normal or non-critical lab and imaging results will be communicated to you by OpenTrusthart, letter or phone within 4 business days after the clinic has received the results. If you do not hear from us within 7 days, please contact the clinic through OpenTrusthart or phone. If you have a critical or abnormal lab result, we will notify you by phone as soon as possible.  Submit refill requests through [employer] or call your pharmacy and they will forward the refill request to us. Please allow 3 business days for your refill to be completed.          Additional Information About Your Visit        OpenTrustharSnapUp Information     [employer] gives you secure access to your electronic health record. If you see a primary care provider, you can also send messages to your care team and make appointments. If you have questions, please call your primary care clinic.  If you do not have a primary care provider, please call [phone] and they will assist you.        Care EveryWhere ID     This is your Care EveryWhere ID. This could be used by other organizations to access your [facility] medical records  [id number]        Your Vitals Were     Pulse Temperature Respirations Height BMI (Body Mass Index)       54 97.7  F (36.5  C) (Temporal) 14 5' 7.6\" (1.717 m) 34.51 kg/m2        Blood Pressure from Last 3 Encounters:   05/24/17 132/64   03/15/17 137/89   01/13/17 136/72    Weight from Last 3 Encounters:   05/24/17 224 lb 4.8 oz (101.7 kg)   01/13/17 219 lb 11.2 oz (99.7 kg)   01/04/17 223 lb (101.2 kg)              Today, you had the following     No orders found for display         Today's Medication Changes          These changes are accurate as of: 5/24/17  3:19 PM.  If you have any questions, ask your nurse or doctor.             "   Start taking these medicines.        Dose/Directions    predniSONE 20 MG tablet   Commonly known as:  DELTASONE   Used for:  Trochanteric bursitis of right hip   Started by:  Vinod Hercules MD        Take 3 tabs (60 mg) by mouth daily x 3 days, 2 tabs (40 mg) daily x 3 days, 1 tab (20 mg) daily x 3 days, then 1/2 tab (10 mg) x 3 days.   Quantity:  20 tablet   Refills:  0         Stop taking these medicines if you haven't already. Please contact your care team if you have questions.     metoprolol 50 MG 24 hr tablet   Commonly known as:  TOPROL-XL   Stopped by:  Vinod Hercules MD                Where to get your medicines      These medications were sent to [facility] [id number] [place] [id number]     Phone:  [phone]     predniSONE 20 MG tablet                Primary Care Provider Office Phone # Fax #    Vinod Hercules [phone]       [facility] [id number] [place] [id number]        Thank you!     Thank you for choosing [facility]  for your care. Our goal is always to provide you with excellent care. Hearing back from our patients is one way we can continue to improve our services. Please take a few minutes to complete the written survey that you may receive in the mail after your visit with us. Thank you!             Your Updated Medication List - Protect others around you: Learn how to safely use, store and throw away your medicines at www.disposemymeds.org.          This list is accurate as of: 5/24/17  3:19 PM.  Always use your most recent med list.                   Brand Name Dispense Instructions for use    aspirin 325 MG EC tablet     100 tablet    Take 1 tablet by mouth daily.       atorvastatin 20 MG tablet    LIPITOR    90 tablet    Take 1 tablet (20 mg) by mouth daily       CIALIS 20 MG tablet   Generic drug:  tadalafil     9 tablet    Take 1/2 to 1  tablet by  mouth daily as needed,  never use with  nitroglycerin, terazosin,  or doxazosin       GLUCOSAMINE CHONDROITIN ADV PO      2-3 tablets daily.       Krill Oil 1000 MG Caps      Take by mouth daily       losartan 50 MG tablet    COZAAR    90 tablet    Take 1 tablet (50 mg) by mouth daily       MULTIVITAL PO      Once daily.       order for DME     1 Units    Equipment being ordered: Home blood pressure monitoring device--patient choice of models. Recommend a model with an arm cuff for testing.       predniSONE 20 MG tablet    DELTASONE    20 tablet    Take 3 tabs (60 mg) by mouth daily x 3 days, 2 tabs (40 mg) daily x 3 days, 1 tab (20 mg) daily x 3 days, then 1/2 tab (10 mg) x 3 days.       tamsulosin 0.4 MG capsule    FLOMAX    180 capsule    TAKE 2 CAPSULES EVERY DAY

## 2017-05-24 NOTE — NURSING NOTE
"Chief Complaint   Patient presents with     Bursitis     right hip pain     Panel Management     UTD       Initial /64 (BP Location: Left arm, Patient Position: Chair, Cuff Size: Adult Large)  Pulse 54  Temp 97.7  F (36.5  C) (Temporal)  Resp 14  Ht 5' 7.6\" (1.717 m)  Wt 224 lb 4.8 oz (101.7 kg)  BMI 34.51 kg/m2 Estimated body mass index is 34.51 kg/(m^2) as calculated from the following:    Height as of this encounter: 5' 7.6\" (1.717 m).    Weight as of this encounter: 224 lb 4.8 oz (101.7 kg).  Medication Reconciliation: complete  Prachi Newman CMA (AAMA)    "

## 2017-09-26 DIAGNOSIS — N18.30 CKD (CHRONIC KIDNEY DISEASE) STAGE 3, GFR 30-59 ML/MIN (H): Primary | ICD-10-CM

## 2017-10-10 ENCOUNTER — OFFICE VISIT (OUTPATIENT)
Dept: NEPHROLOGY | Facility: CLINIC | Age: 67
End: 2017-10-10
Payer: COMMERCIAL

## 2017-10-10 VITALS
SYSTOLIC BLOOD PRESSURE: 139 MMHG | BODY MASS INDEX: 36.73 KG/M2 | WEIGHT: 238.7 LBS | HEART RATE: 65 BPM | OXYGEN SATURATION: 97 % | DIASTOLIC BLOOD PRESSURE: 92 MMHG

## 2017-10-10 DIAGNOSIS — R60.0 LOWER EXTREMITY EDEMA: ICD-10-CM

## 2017-10-10 DIAGNOSIS — I10 HYPERTENSION, GOAL BELOW 140/90: ICD-10-CM

## 2017-10-10 DIAGNOSIS — N18.30 CKD (CHRONIC KIDNEY DISEASE) STAGE 3, GFR 30-59 ML/MIN (H): Primary | ICD-10-CM

## 2017-10-10 DIAGNOSIS — N18.30 CKD (CHRONIC KIDNEY DISEASE) STAGE 3, GFR 30-59 ML/MIN (H): ICD-10-CM

## 2017-10-10 LAB
ALBUMIN SERPL-MCNC: 4 G/DL (ref 3.4–5)
ANION GAP SERPL CALCULATED.3IONS-SCNC: 6 MMOL/L (ref 3–14)
BUN SERPL-MCNC: 25 MG/DL (ref 7–30)
CALCIUM SERPL-MCNC: 9.3 MG/DL (ref 8.5–10.1)
CHLORIDE SERPL-SCNC: 108 MMOL/L (ref 94–109)
CO2 SERPL-SCNC: 29 MMOL/L (ref 20–32)
CREAT SERPL-MCNC: 1.34 MG/DL (ref 0.66–1.25)
CREAT UR-MCNC: 107 MG/DL
GFR SERPL CREATININE-BSD FRML MDRD: 53 ML/MIN/1.7M2
GLUCOSE SERPL-MCNC: 96 MG/DL (ref 70–99)
HGB BLD-MCNC: 14.2 G/DL (ref 13.3–17.7)
PHOSPHATE SERPL-MCNC: 3 MG/DL (ref 2.5–4.5)
POTASSIUM SERPL-SCNC: 4.4 MMOL/L (ref 3.4–5.3)
PROT UR-MCNC: 0.1 G/L
PROT/CREAT 24H UR: 0.09 G/G CR (ref 0–0.2)
PTH-INTACT SERPL-MCNC: 74 PG/ML (ref 12–72)
SODIUM SERPL-SCNC: 143 MMOL/L (ref 133–144)

## 2017-10-10 PROCEDURE — 85018 HEMOGLOBIN: CPT | Performed by: INTERNAL MEDICINE

## 2017-10-10 PROCEDURE — 83970 ASSAY OF PARATHORMONE: CPT | Performed by: INTERNAL MEDICINE

## 2017-10-10 PROCEDURE — 84156 ASSAY OF PROTEIN URINE: CPT | Performed by: INTERNAL MEDICINE

## 2017-10-10 PROCEDURE — 99214 OFFICE O/P EST MOD 30 MIN: CPT | Performed by: INTERNAL MEDICINE

## 2017-10-10 PROCEDURE — 80069 RENAL FUNCTION PANEL: CPT | Performed by: INTERNAL MEDICINE

## 2017-10-10 PROCEDURE — 36415 COLL VENOUS BLD VENIPUNCTURE: CPT | Performed by: INTERNAL MEDICINE

## 2017-10-10 NOTE — NURSING NOTE
"Driss Pope's goals for this visit include: CC  He requests these members of his care team be copied on today's visit information: No    PCP: Vinod Hercules    Referring Provider:  No referring provider defined for this encounter.    Chief Complaint   Patient presents with     RECHECK       Initial There were no vitals taken for this visit. Estimated body mass index is 34.51 kg/(m^2) as calculated from the following:    Height as of 5/24/17: 1.717 m (5' 7.6\").    Weight as of 5/24/17: 101.7 kg (224 lb 4.8 oz).  Medication Reconciliation: complete  "

## 2017-10-10 NOTE — PROGRESS NOTES
10/10/2017     CC: Chronic Kidney Disease (CKD)    HPI: Driss Pope is a 67 year old male who presents for follow-up of stage 3 CKD thought due to hypertension. He is here for his one year follow-up. He continues to be active with his dancing - no edema concerns at this time.   His creatinine has been 1.29-1.5 on in the past year and currently stable at 1.34 today with a correlating GFR of 53. He underwent colonoscopy in December 2016 otherwise has not had any changes in his medical history. She reports having some aches and pains related to dancing on review of systems. He does use Advil very occasionally when needed for this pain but typically tries to use Tylenol. He thinks he may have had a gout episode last month but denies any pain or concerns at this time. She is planning cataract surgery for the end of the month. Blood pressure at home is typically 130s over 70s. He has weaned himself down on some of his blood pressure meds in the past year and finds that his blood pressure is stable at this time.    Allergies   Allergen Reactions     Lisinopril Cough     Persistent cough with Lisinopril         Current Outpatient Prescriptions on File Prior to Visit:  tamsulosin (FLOMAX) 0.4 MG capsule TAKE 2 CAPSULES EVERY DAY   losartan (COZAAR) 50 MG tablet Take 1 tablet (50 mg) by mouth daily   atorvastatin (LIPITOR) 20 MG tablet Take 1 tablet (20 mg) by mouth daily   CIALIS 20 MG tablet Take 1/2 to 1 tablet by  mouth daily as needed,  never use with  nitroglycerin, terazosin,  or doxazosin   Krill Oil 1000 MG CAPS Take by mouth daily   aspirin 325 MG EC tablet Take 1 tablet by mouth daily.   Misc Natural Products (GLUCOSAMINE CHONDROITIN ADV PO) 2-3 tablets daily.    Multiple Vitamins-Minerals (MULTIVITAL PO) Once daily.      No current facility-administered medications on file prior to visit.     Past Medical History:   Diagnosis Date     CKD (chronic kidney disease)      Hyperlipidemia LDL goal < 100       Hypertension goal BP (blood pressure) < 130/80      Hypertriglyceridemia      Obesity      Osteoarthritis        Social History   Substance Use Topics     Smoking status: Never Smoker     Smokeless tobacco: Never Used     Alcohol use Yes      Comment: occasionally         Family History   Problem Relation Age of Onset     Alzheimer Disease Mother      Hypertension Mother      HEART DISEASE Father      CHF     Alzheimer Disease Paternal Grandmother      Alzheimer Disease Paternal Grandfather      CANCER Brother      esophageal cancer     Prostate Cancer Brother      Prostate Cancer Brother      DIABETES Sister      Unknown/Adopted Son      adopted     Asthma No family hx of      C.A.D. No family hx of      CEREBROVASCULAR DISEASE No family hx of      Breast Cancer No family hx of      Cancer - colorectal No family hx of      Alcohol/Drug No family hx of      Allergies No family hx of      Anesthesia Reaction No family hx of      Arthritis No family hx of      Blood Disease No family hx of      Cardiovascular No family hx of      Circulatory No family hx of      Congenital Anomalies No family hx of      Connective Tissue Disorder No family hx of      Depression No family hx of      Endocrine Disease No family hx of      Genetic Disorder No family hx of      Eye Disorder No family hx of      GASTROINTESTINAL DISEASE No family hx of      Genitourinary Problems No family hx of      Gynecology No family hx of      Lipids No family hx of      Musculoskeletal Disorder No family hx of      Neurologic Disorder No family hx of      Obesity No family hx of      OSTEOPOROSIS No family hx of      Psychotic Disorder No family hx of      Respiratory No family hx of      Thyroid Disease No family hx of      Family History Negative No family hx of      Hearing Loss No family hx of      Substance Abuse No family hx of      MENTAL ILLNESS No family hx of      Anxiety Disorder No family hx of      Hyperlipidemia No family hx of           ROS: A 4 system review of systems was negative other than noted here or above.     Exam:  BP (!) 139/92 (BP Location: Left arm, Patient Position: Chair, Cuff Size: Adult Large)  Pulse 65  Wt 108.3 kg (238 lb 11.2 oz)  SpO2 97%  BMI 36.73 kg/m2  GENERAL APPEARANCE: alert and no distress  RESP: lungs clear to auscultation  CV: regular rhythm, normal rate, no rub  EXT: no lower ext edema bilaterally  SKIN: no rash  NEURO: mentation intact and speech normal  PSYCH: affect normal/bright    Orders Only on 10/10/2017   Component Date Value Ref Range Status     Sodium 10/10/2017 143  133 - 144 mmol/L Final     Potassium 10/10/2017 4.4  3.4 - 5.3 mmol/L Final     Chloride 10/10/2017 108  94 - 109 mmol/L Final     Carbon Dioxide 10/10/2017 29  20 - 32 mmol/L Final     Anion Gap 10/10/2017 6  3 - 14 mmol/L Final     Glucose 10/10/2017 96  70 - 99 mg/dL Final     Urea Nitrogen 10/10/2017 25  7 - 30 mg/dL Final     Creatinine 10/10/2017 1.34* 0.66 - 1.25 mg/dL Final     GFR Estimate 10/10/2017 53* >60 mL/min/1.7m2 Final    Non  GFR Calc     GFR Estimate If Black 10/10/2017 64  >60 mL/min/1.7m2 Final    African American GFR Calc     Calcium 10/10/2017 9.3  8.5 - 10.1 mg/dL Final     Phosphorus 10/10/2017 3.0  2.5 - 4.5 mg/dL Final     Albumin 10/10/2017 4.0  3.4 - 5.0 g/dL Final     Hemoglobin 10/10/2017 14.2  13.3 - 17.7 g/dL Final          Assessment/Plan:  89.3F CKD (chronic kidney disease) stage 3, GFR 30-59 ml/min  Comment: His CKD is probably related to longstanding hypertension. Kidney function stable and no proteinuria previously. Will plan to follow-up in one year. If proteinuria is present on his check today and may consider increasing losartan but otherwise no changes made today.    Hypertension: at goal overall. No changes are made today.    Edema: resolved    Patient Instructions   Follow-up in one year  Labs in 6 months       Rosalie Fitzgerald DO

## 2017-10-10 NOTE — MR AVS SNAPSHOT
After Visit Summary   10/10/2017    Driss Pope    MRN: [id number]           Patient Information     Date Of Birth          1950        Visit Information        Provider Department      10/10/2017 9:30 AM Rosalie Fitzgerald MD [facility]        Today's Diagnoses     CKD (chronic kidney disease) stage 3, GFR 30-59 ml/min    -  1      Care Instructions    Follow-up in one year  Labs in 6 months          Follow-ups after your visit        Your next 10 appointments already scheduled     Oct 16, 2017  9:20 AM CDT   Pre-Op physical with Vinod Hercules MD   [facility] ([facility])    [address], [address]  [place] [id number] [phone]            Oct 09, 2018  9:00 AM CDT   LAB with LAB [facility] ([facility])    [address] [place] [id number] [phone]           Patient must bring picture ID. Patient should be prepared to give a urine specimen  Please do not eat 10-12 hours before your appointment if you are coming in fasting for labs on lipids, cholesterol, or glucose (sugar). Pregnant women should follow their Care Team instructions. Water with medications is okay. Do not drink coffee or other fluids. If you have concerns about taking  your medications, please ask at office or if scheduling via [employer], send a message by clicking on Secure Messaging, Message Your Care Team.            Oct 09, 2018  9:30 AM CDT   Return Visit with Rosalie Fitzgerald MD   [facility] ([facility])    [address] [place] [id number] [phone]              Future tests that were ordered for you today     Open Future Orders        Priority Expected Expires Ordered    Renal panel Routine 4/10/2018 10/10/2018 10/10/2017            Who to contact     If you have questions or need follow up information about today's clinic  visit or your schedule please contact [facility] directly at [phone].  Normal or non-critical lab and imaging results will be communicated to you by [employer], letter or phone within 4 business days after the clinic has received the results. If you do not hear from us within 7 days, please contact the clinic through [employer] or phone. If you have a critical or abnormal lab result, we will notify you by phone as soon as possible.  Submit refill requests through [employer] or call your pharmacy and they will forward the refill request to us. Please allow 3 business days for your refill to be completed.          Additional Information About Your Visit        [employer] Information     [employer] gives you secure access to your electronic health record. If you see a primary care provider, you can also send messages to your care team and make appointments. If you have questions, please call your primary care clinic.  If you do not have a primary care provider, please call [phone] and they will assist you.      [employer] is an electronic gateway that provides easy, online access to your medical records. With [employer], you can request a clinic appointment, read your test results, renew a prescription or communicate with your care team.     To access your existing account, please contact your [facility] or call [phone] for assistance.        Care EveryWhere ID     This is your Care EveryWhere ID. This could be used by other organizations to access your [facility] medical records  [id number]        Your Vitals Were     Pulse Pulse Oximetry BMI (Body Mass Index)             65 97% 36.73 kg/m2          Blood Pressure from Last 3 Encounters:   10/10/17 (!) 139/92   05/24/17 132/64   03/15/17 137/89    Weight from Last 3 Encounters:   10/10/17 108.3 kg (238 lb 11.2 oz)   05/24/17 101.7 kg (224 lb 4.8 oz)   01/13/17 99.7 kg (219 lb 11.2 oz)               Primary Care Provider  Office Phone # Fax #    Vinod Hercules [phone] [address] [address]        Equal Access to Services     MAILESHEA QIANA : Renea pernell hunter amish Cabral, waaxda luqadaha, qaybta kaalmada adebhaveshda, karlene lucas barbmary ann ching isacc ashby. So [facility] [phone].    ATENCIÓN: Si habla español, tiene a fitzgerald disposición servicios gratuitos de asistencia lingüística. Llame al [phone].    We comply with applicable federal civil rights laws and [place] laws. We do not discriminate on the basis of race, color, national origin, age, disability, sex, sexual orientation, or gender identity.            Thank you!     Thank you for choosing [facility]  for your care. Our goal is always to provide you with excellent care. Hearing back from our patients is one way we can continue to improve our services. Please take a few minutes to complete the written survey that you may receive in the mail after your visit with us. Thank you!             Your Updated Medication List - Protect others around you: Learn how to safely use, store and throw away your medicines at www.disposemymeds.org.          This list is accurate as of: 10/10/17  9:42 AM.  Always use your most recent med list.                   Brand Name Dispense Instructions for use Diagnosis    aspirin 325 MG EC tablet     100 tablet    Take 1 tablet by mouth daily.    CKD (chronic kidney disease) stage 2, GFR 60-89 ml/min, Hypertension goal BP (blood pressure) < 130/80       atorvastatin 20 MG tablet    LIPITOR    90 tablet    Take 1 tablet (20 mg) by mouth daily    Hyperlipidemia LDL goal <100, CKD (chronic kidney disease) stage 3, GFR 30-59 ml/min       CIALIS 20 MG tablet   Generic drug:  tadalafil     9 tablet    Take 1/2 to 1 tablet by  mouth daily as needed,  never use with  nitroglycerin, terazosin,  or doxazosin    ED (erectile dysfunction)       GLUCOSAMINE CHONDROITIN ADV PO      2-3 tablets daily.         Krill Oil 1000 MG Caps      Take by mouth daily        losartan 50 MG tablet    COZAAR    90 tablet    Take 1 tablet (50 mg) by mouth daily    Hypertension, goal below 140/90, CKD (chronic kidney disease) stage 3, GFR 30-59 ml/min       MULTIVITAL PO      Once daily.        tamsulosin 0.4 MG capsule    FLOMAX    180 capsule    TAKE 2 CAPSULES EVERY DAY    Benign non-nodular prostatic hyperplasia with lower urinary tract symptoms

## 2017-10-12 NOTE — PROGRESS NOTES
[facility] [place]  [address], [address]  [place] [address]  [phone]  Dept: [phone]    PRE-OP EVALUATION:  Today's date: 10/16/2017    Driss Pope (: 1950) presents for pre-operative evaluation assessment as requested by Dr. Daley.  He requires evaluation and anesthesia risk assessment prior to undergoing surgery/procedure for treatment of bilateral cataracts .  Proposed procedure: Bilateral cataract extraction with IOL    Date of Surgery/ Procedure: 10/24/17 for the left eye, and right eye [facility]   Time of Surgery/ Procedure: [facility]   Hospital/Surgical Facility: [facility]    Fax number for surgical facility: [phone]  Primary Physician: Vinod Hercules  Type of Anesthesia Anticipated: General    Patient has a Health Care Directive or Living Will:  YES     1. NO - Do you have a history of heart attack, stroke, stent, bypass or surgery on an artery in the head, neck, heart or legs?  2. YES - DO YOU EVER HAVE ANY PAIN OR DISCOMFORT IN YOUR CHEST? No history of cardiac issues  3. NO - Do you have a history of  Heart Failure?  4. YES - ARE YOUR TROUBLED BY SHORTNESS OF BREATH WHEN WALKING ON THE LEVEL, UP A SLIGHT HILL OR AT NIGHT? Recent left lower chest pain--non-cardiac  5. YES - DO YOU CURRENTLY HAVE A COLD, BRONCHITIS OR OTHER RESPIRATORY INFECTION? Slight nasal congestion without fever  6. YES - DO YOU HAVE A COUGH, SHORTNESS OF BREATH OR WHEEZING? Noted recently with activity  7. NO - Do you sometimes get pains in the calves of your legs when you walk?  8. NO - Do you or anyone in your family have previous history of blood clots?  9. NO - Do you or does anyone in your family have a serious bleeding problem such as prolonged bleeding following surgeries or cuts?  10. NO - Have you ever had problems with anemia or been told to take iron pills?  11. NO - Have you had any abnormal blood loss such as black, tarry or bloody stools, or abnormal vaginal  bleeding?  12. NO - Have you ever had a blood transfusion?  13. YES - HAVE YOU OR ANY OF YOUR RELATIVES EVER HAD PROBLEMS WITH ANESTHESIA? None noted--error  14. NO - Do you have sleep apnea, excessive snoring or daytime drowsiness?  15. NO - Do you have any prosthetic heart valves?  16. YES - DO YOU HAVE PROSTHETIC JOINTS? Bilateral hip prostheses  17. NO - Is there any chance that you may be pregnant?        HPI:                                                      Brief HPI related to upcoming procedure:     Driss Pope 67-year-old gentleman seen for preoperative evaluation prior to undergoing bilateral cataract extraction and intraocular lens implantation. Patient will have these done in stages.      See problem list for active medical problems.  Problems all longstanding and stable, except as noted/documented.  See ROS for pertinent symptoms related to these conditions.                                                                                                  .    MEDICAL HISTORY:                                                    Patient Active Problem List    Diagnosis Date Noted     Morbid obesity (H) 10/16/2017     Priority: Medium     Hypertension, goal below 140/90 10/11/2016     Priority: Medium     Benign non-nodular prostatic hyperplasia with lower urinary tract symptoms 12/14/2015     Priority: Medium     ED (erectile dysfunction) 06/24/2014     Priority: Medium     Lower extremity edema 06/26/2013     Priority: Medium     Advanced directives, counseling/discussion 09/25/2012     Priority: Medium     Patient states has Advance Directive and will bring in a copy to clinic. 9/25/2012          Abnormal PSA 09/25/2012     Priority: Medium     CKD (chronic kidney disease) stage 3, GFR 30-59 ml/min 03/25/2011     Priority: Medium     Hyperlipidemia LDL goal <100 02/01/2011     Priority: Medium     Hypertriglyceridemia      Priority: Medium      Past Medical History:   Diagnosis Date     CKD (chronic  kidney disease)      Hyperlipidemia LDL goal < 100      Hypertension goal BP (blood pressure) < 130/80      Hypertriglyceridemia      Obesity      Osteoarthritis      Past Surgical History:   Procedure Laterality Date     ARTHROSCOPY KNEE RT/LT      Left knee     COLONOSCOPY  10/4/2011    Procedure:COLONOSCOPY; Colonoscopy, screening; Surgeon:HANNAH COMER; Location:[facility]     COLONOSCOPY  10/4/2011    Procedure:COMBINED COLONOSCOPY, REMOVE TUMOR/POLYP/LESION BY SNARE; Surgeon:HANNAH COMER; Location:[facility] OR     COLONOSCOPY N/A 12/9/2016    Procedure: COMBINED COLONOSCOPY, SINGLE OR MULTIPLE BIOPSY/POLYPECTOMY BY BIOPSY;  Surgeon: Duane, William Charles, MD;  Location: [facility] OR     COLONOSCOPY WITH CO2 INSUFFLATION N/A 12/9/2016    Procedure: COLONOSCOPY WITH CO2 INSUFFLATION;  Surgeon: Duane, William Charles, MD;  Location: [facility] OR     COLONOSCOPY WITH CO2 INSUFFLATION N/A 3/15/2017    Procedure: COLONOSCOPY WITH CO2 INSUFFLATION;  Surgeon: Duane, William Charles, MD;  Location: [facility] OR     JOINT REPLACEMENT, HIP RT/LT      Joint Replacement Hip RT/LT-both replced in the last 5 years     TONSILLECTOMY       Current Outpatient Prescriptions   Medication Sig Dispense Refill     tamsulosin (FLOMAX) 0.4 MG capsule TAKE 2 CAPSULES EVERY  capsule 2     losartan (COZAAR) 50 MG tablet Take 1 tablet (50 mg) by mouth daily 90 tablet 3     atorvastatin (LIPITOR) 20 MG tablet Take 1 tablet (20 mg) by mouth daily 90 tablet 3     Krill Oil 1000 MG CAPS Take by mouth daily       aspirin 325 MG EC tablet Take 1 tablet by mouth daily. 100 tablet 3     Misc Natural Products (GLUCOSAMINE CHONDROITIN ADV PO) 2-3 tablets daily.        Multiple Vitamins-Minerals (MULTIVITAL PO) Once daily.        gatifloxacin (ZYMAXID) 0.5 % ophthalmic solution INT 1 GTT SURGICAL EYE FOUR TIMES DAILY. START 1 DAY B SURGERY AND CONT UNTIL BOTTLE IS EMPTY. NTE 4 WEEKS.  1     ketorolac (ACULAR) 0.5 % ophthalmic solution INT 1 GTT SURGICAL EYE FOUR  TIMES DAILY. START 1 DAY B SURGERY AND. CONT UNTIL BOTTLE IS EMPTY. NTE 4 WKS  1     prednisoLONE acetate (PRED FORTE) 1 % ophthalmic susp   1     CIALIS 20 MG tablet Take 1/2 to 1 tablet by  mouth daily as needed,  never use with  nitroglycerin, terazosin,  or doxazosin (Patient not taking: Reported on 10/16/2017) 9 tablet 0     OTC products: None, except as noted above    Allergies   Allergen Reactions     Lisinopril Cough     Persistent cough with Lisinopril      Latex Allergy: NO    Social History   Substance Use Topics     Smoking status: Never Smoker     Smokeless tobacco: Never Used     Alcohol use Yes      Comment: occasionally     History   Drug Use No       REVIEW OF SYSTEMS:                                                    C: NEGATIVE for fever, chills, change in weight  CONSTITUTIONAL: Continues to be overweight  I: NEGATIVE for worrisome rashes, moles or lesions  E: NEGATIVE for vision changes or irritation  E/M: NEGATIVE for ear, mouth and throat problems  R: NEGATIVE for significant cough or SOB  B: NEGATIVE for masses, tenderness or discharge  CV: Slightly atypical type chest pain over the last week involving very localized area of the left lateral thorax. Pain with movement and deep breathing. No cough or major shortness of breath other than splinting because of the pain. No major cardiac issues in the past. Long-standing hypertension.  GI: NEGATIVE for nausea, abdominal pain, heartburn, or change in bowel habits  : NEGATIVE for frequency, dysuria, or hematuria. Has a stable urinary flow pattern   male : Followed by his nephrologist for chronic renal disease. Stable.  M: NEGATIVE for significant arthralgias or myalgia  N: NEGATIVE for weakness, dizziness or paresthesias  E: NEGATIVE for temperature intolerance, skin/hair changes  H: NEGATIVE for bleeding problems  P: NEGATIVE for changes in mood or affect    EXAM:                                                    /80  Pulse 68  Temp  "97.8  F (36.6  C) (Temporal)  Resp 12  Ht 5' 7.72\" (1.72 m)  Wt 237 lb (107.5 kg)  BMI 36.34 kg/m2    GENERAL APPEARANCE: healthy, alert and no distress    GENERAL APPEARANCE: cooperative and over weight     EYES: EOMI,  PERRL     HENT: ear canals and TM's normal and nose and mouth without ulcers or lesions     NECK: no adenopathy, no asymmetry, masses, or scars and thyroid normal to palpation     RESP: lungs clear to auscultation - no rales, rhonchi or wheezes     BREAST: normal without masses, tenderness or nipple discharge and no palpable axillary masses or adenopathy     CV: regular rates and rhythm, normal S1 S2, no S3 or S4 and no murmur, click or rub     ABDOMEN:  soft, nontender, no HSM or masses and bowel sounds normal     Rectal exam: deferred     GU_male: testicles normal without atrophy or masses and no hernias     MS: extremities normal- no gross deformities noted, no evidence of inflammation in joints, FROM in all extremities.     MS: peripheral pulses normal     SKIN: no suspicious lesions or rashes     NEURO: Normal strength and tone, sensory exam grossly normal, mentation intact and speech normal     PSYCH: mentation appears normal. and affect normal/bright     LYMPHATICS: No axillary, cervical, or supraclavicular nodes    DIAGNOSTICS:                                                      Labs Resulted Today:   Results for orders placed or performed in visit on 10/16/17   EKG 12-lead complete w/read - Clinics    Impression    Normal sinus rhythm. Normal axis and intervals. Normal R wave progression. No ST-T wave changes. Impression: Normal EKG.  Vinod Hercules MD         Recent Labs   Lab Test  10/10/17   0907  01/02/17   0910   06/30/16   0814   12/14/15   0816   06/15/15   0842   HGB  14.2  14.1   < >  13.5   --    --    < >  13.4   PLT   --   161   --   160   --    --    --   202   NA  143  142   < >  141   < >   --    < >  143   POTASSIUM  4.4  4.5   < >  4.1   < >   --    < >  4.7   CR  " 1.34*  1.51*   < >  1.19   < >   --    < >  1.44*   A1C   --    --    --    --    --   5.3   --   5.9    < > = values in this interval not displayed.        IMPRESSION:                                                    Reason for surgery/procedure:     Bilateral cataracts // bilateral cataract extraction with intraocular lens implantation    Diagnosis/reason for consult:     (Z01.818) Preop general physical exam  (primary encounter diagnosis)  Comment: Preoperative evaluation completed and stable. Cleared for surgery  Plan:     (H26.9) Cataract of both eyes, unspecified cataract type  Comment: As noted above for preoperative diagnoses  Plan:     (R07.9) Left sided chest pain  Comment: Appears atypical for any cardiac issues. They have mild pleurisy versus musculoskeletal changes. Chest x-ray is pending.  Plan: XR Chest 2 Views, EKG 12-lead complete w/read -        Clinics            (I10) Hypertension, goal below 140/90  Comment: Blood pressure control some lability.  Plan: EKG 12-lead complete w/read - Clinics            (N18.3) CKD (chronic kidney disease) stage 3, GFR 30-59 ml/min  Comment: Stable and followed by his nephrologist  Plan:     (E78.5) Hyperlipidemia LDL goal <100  Comment: On preventative therapy.  Plan:             The proposed surgical procedure is considered LOW risk.    REVISED CARDIAC RISK INDEX  The patient has the following serious cardiovascular risks for perioperative complications such as (MI, PE, VFib and 3  AV Block):  No serious cardiac risks  INTERPRETATION: 0 risks: Class I (very low risk - 0.4% complication rate)    The patient has the following additional risks for perioperative complications:  No identified additional risks        RECOMMENDATIONS:                                                      --Consult hospital rounder / IM to assist post-op medical management    --Patient is to take all scheduled medications on the day of surgery EXCEPT for modifications listed  below.    ACE Inhibitor or Angiotensin Receptor Blocker (ARB) Use  Ace inhibitor or Angiotensin Receptor Blocker (ARB) and should HOLD this medication for the 24 hours prior to surgery.        APPROVAL GIVEN to proceed with proposed procedure, without further diagnostic evaluation       Signed Electronically by: Vinod Hercules MD    Copy of this evaluation report is provided to requesting physician.    [facility] Preop Guidelines

## 2017-10-16 ENCOUNTER — OFFICE VISIT (OUTPATIENT)
Dept: FAMILY MEDICINE | Facility: CLINIC | Age: 67
End: 2017-10-16
Payer: COMMERCIAL

## 2017-10-16 ENCOUNTER — RADIANT APPOINTMENT (OUTPATIENT)
Dept: GENERAL RADIOLOGY | Facility: CLINIC | Age: 67
End: 2017-10-16
Attending: FAMILY MEDICINE
Payer: COMMERCIAL

## 2017-10-16 VITALS
HEIGHT: 68 IN | WEIGHT: 237 LBS | TEMPERATURE: 97.8 F | SYSTOLIC BLOOD PRESSURE: 135 MMHG | HEART RATE: 68 BPM | BODY MASS INDEX: 35.92 KG/M2 | DIASTOLIC BLOOD PRESSURE: 80 MMHG | RESPIRATION RATE: 12 BRPM

## 2017-10-16 DIAGNOSIS — E78.5 HYPERLIPIDEMIA LDL GOAL <100: ICD-10-CM

## 2017-10-16 DIAGNOSIS — R07.9 LEFT SIDED CHEST PAIN: ICD-10-CM

## 2017-10-16 DIAGNOSIS — N18.30 CKD (CHRONIC KIDNEY DISEASE) STAGE 3, GFR 30-59 ML/MIN (H): ICD-10-CM

## 2017-10-16 DIAGNOSIS — Z01.818 PREOP GENERAL PHYSICAL EXAM: Primary | ICD-10-CM

## 2017-10-16 DIAGNOSIS — H26.9 CATARACT OF BOTH EYES, UNSPECIFIED CATARACT TYPE: ICD-10-CM

## 2017-10-16 DIAGNOSIS — I10 HYPERTENSION, GOAL BELOW 140/90: ICD-10-CM

## 2017-10-16 PROCEDURE — 71020 XR CHEST 2 VW: CPT

## 2017-10-16 PROCEDURE — 99215 OFFICE O/P EST HI 40 MIN: CPT | Performed by: FAMILY MEDICINE

## 2017-10-16 PROCEDURE — 93000 ELECTROCARDIOGRAM COMPLETE: CPT | Performed by: FAMILY MEDICINE

## 2017-10-16 RX ORDER — GATIFLOXACIN 5 MG/ML
SOLUTION/ DROPS OPHTHALMIC
Refills: 1 | COMMUNITY
Start: 2017-09-27 | End: 2017-12-22

## 2017-10-16 RX ORDER — KETOROLAC TROMETHAMINE 5 MG/ML
SOLUTION OPHTHALMIC
Refills: 1 | COMMUNITY
Start: 2017-09-27 | End: 2017-12-22

## 2017-10-16 RX ORDER — PREDNISOLONE ACETATE 10 MG/ML
SUSPENSION/ DROPS OPHTHALMIC
Refills: 1 | COMMUNITY
Start: 2017-09-27 | End: 2017-12-22

## 2017-10-16 ASSESSMENT — PAIN SCALES - GENERAL: PAINLEVEL: NO PAIN (0)

## 2017-10-16 NOTE — MR AVS SNAPSHOT
After Visit Summary   10/16/2017    Driss Pope    MRN: [id number]           Patient Information     Date Of Birth          1950        Visit Information        Provider Department      10/16/2017 9:20 AM Vinod Hercules MD [facility]        Today's Diagnoses     Preop general physical exam    -  1    Cataract of both eyes, unspecified cataract type        Left sided chest pain        Hypertension, goal below 140/90        CKD (chronic kidney disease) stage 3, GFR 30-59 ml/min        Hyperlipidemia LDL goal <100          Care Instructions      Before Your Surgery      Call your surgeon if there is any change in your health. This includes signs of a cold or flu (such as a sore throat, runny nose, cough, rash or fever).    Do not smoke, drink alcohol or take over the counter medicine (unless your surgeon or primary care doctor tells you to) for the 24 hours before and after surgery.    If you take prescribed drugs: Follow your doctor s orders about which medicines to take and which to stop until after surgery.    Eating and drinking prior to surgery: follow the instructions from your surgeon    Take a shower or bath the night before surgery. Use the soap your surgeon gave you to gently clean your skin. If you do not have soap from your surgeon, use your regular soap. Do not shave or scrub the surgery site.  Wear clean pajamas and have clean sheets on your bed.           Follow-ups after your visit        Your next 10 appointments already scheduled     Oct 09, 2018  9:00 AM CDT   LAB with LAB FIRST FLOOR [facility] ([facility])    [address] [place] [address] [phone]           Patient must bring picture ID. Patient should be prepared to give a urine specimen  Please do not eat 10-12 hours before your appointment if you are coming in fasting for labs on lipids, cholesterol, or glucose (sugar). Pregnant women  "should follow their Care Team instructions. Water with medications is okay. Do not drink coffee or other fluids. If you have concerns about taking  your medications, please ask at office or if scheduling via "[employer] send a message by clicking on Secure Messaging, Message Your Care Team.            Oct 09, 2018  9:30 AM CDT   Return Visit with Rosalie Fitzgerald MD   [facility]    [address] [place] [address] [phone]              Who to contact     If you have questions or need follow up information about today's clinic visit or your schedule please contact [facility] directly at [phone].  Normal or non-critical lab and imaging results will be communicated to you by [employer], letter or phone within 4 business days after the clinic has received the results. If you do not hear from us within 7 days, please contact the clinic through [employer] or phone. If you have a critical or abnormal lab result, we will notify you by phone as soon as possible.  Submit refill requests through "[employer] or call your pharmacy and they will forward the refill request to us. Please allow 3 business days for your refill to be completed.          Additional Information About Your Visit        "[employer] Information     "[employer] gives you secure access to your electronic health record. If you see a primary care provider, you can also send messages to your care team and make appointments. If you have questions, please call your primary care clinic.  If you do not have a primary care provider, please call [phone] and they will assist you.        Care EveryWhere ID     This is your Care EveryWhere ID. This could be used by other organizations to access your [facility] medical records  [id number]        Your Vitals Were     Pulse Temperature Respirations Height BMI (Body Mass Index)       68 97.8  F (36.6  C) (Temporal) 12 5' 7.72\" (1.72 m) 36.34 kg/m2  "       Blood Pressure from Last 3 Encounters:   10/16/17 140/84   10/10/17 (!) 139/92   05/24/17 132/64    Weight from Last 3 Encounters:   10/16/17 237 lb (107.5 kg)   10/10/17 238 lb 11.2 oz (108.3 kg)   05/24/17 224 lb 4.8 oz (101.7 kg)              We Performed the Following     EKG 12-lead complete w/read - Clinics        Primary Care Provider Office Phone # Fax #    Vinod Hercules [phone] [address] [facility] [address]        Equal Access to Services     [facility]: Hadii aad ku hadasho Soomaali, waaxda luqadaha, qaybta kaalmada adeegyada, waxchani dexter . So [facility] [phone].    ATENCIÓN: Si habla español, tiene a fitzgerald disposición servicios gratuitos de asistencia lingüística. [facility] [phone].    We comply with applicable federal civil rights laws and [place] laws. We do not discriminate on the basis of race, color, national origin, age, disability, sex, sexual orientation, or gender identity.            Thank you!     [facility]  [facility]. Our goal is always to provide you with excellent care. Hearing back from our patients is one way we can continue to improve our services. Please take a few minutes to complete the written survey that you may receive in the mail after your visit with us. Thank you!             Your Updated Medication List - Protect others around you: Learn how to safely use, store and throw away your medicines at www.disposemymeds.org.          This list is accurate as of: 10/16/17 10:24 AM.  Always use your most recent med list.                   Brand Name Dispense Instructions for use Diagnosis    aspirin 325 MG EC tablet     100 tablet    Take 1 tablet by mouth daily.    CKD (chronic kidney disease) stage 2, GFR 60-89 ml/min, Hypertension goal BP (blood pressure) < 130/80       atorvastatin 20 MG tablet    LIPITOR    90 tablet    Take 1 tablet (20 mg) by mouth daily     Hyperlipidemia LDL goal <100, CKD (chronic kidney disease) stage 3, GFR 30-59 ml/min       CIALIS 20 MG tablet   Generic drug:  tadalafil     9 tablet    Take 1/2 to 1 tablet by  mouth daily as needed,  never use with  nitroglycerin, terazosin,  or doxazosin    ED (erectile dysfunction)       gatifloxacin 0.5 % ophthalmic solution    ZYMAXID     INT 1 GTT SURGICAL EYE FOUR TIMES DAILY. START 1 DAY B SURGERY AND CONT UNTIL BOTTLE IS EMPTY. NTE 4 WEEKS.        GLUCOSAMINE CHONDROITIN ADV PO      2-3 tablets daily.        ketorolac 0.5 % ophthalmic solution    ACULAR     INT 1 GTT SURGICAL EYE FOUR TIMES DAILY. START 1 DAY B SURGERY AND. CONT UNTIL BOTTLE IS EMPTY. NTE 4 WKS        Krill Oil 1000 MG Caps      Take by mouth daily        losartan 50 MG tablet    COZAAR    90 tablet    Take 1 tablet (50 mg) by mouth daily    Hypertension, goal below 140/90, CKD (chronic kidney disease) stage 3, GFR 30-59 ml/min       MULTIVITAL PO      Once daily.        prednisoLONE acetate 1 % ophthalmic susp    PRED FORTE          tamsulosin 0.4 MG capsule    FLOMAX    180 capsule    TAKE 2 CAPSULES EVERY DAY    Benign non-nodular prostatic hyperplasia with lower urinary tract symptoms

## 2017-10-16 NOTE — NURSING NOTE
"Chief Complaint   Patient presents with     Pre-Op Exam     Panel Management     pneumovax, flu shot, microalbumin, honoring choice,        Initial /84  Pulse 68  Temp 97.8  F (36.6  C) (Temporal)  Resp 12  Ht 5' 7.72\" (1.72 m)  Wt 237 lb (107.5 kg)  BMI 36.34 kg/m2 Estimated body mass index is 36.34 kg/(m^2) as calculated from the following:    Height as of this encounter: 5' 7.72\" (1.72 m).    Weight as of this encounter: 237 lb (107.5 kg).  Medication Reconciliation: complete       Golden Knight MA    "

## 2017-12-04 NOTE — PROGRESS NOTES
"  SUBJECTIVE:                                                    Driss Pope is a 67 year old male who presents to clinic today for the following health issues:      HPI    Acute Illness--    Reporting illness for about 3-4 weeks. Began with a sore throat and some feverish feeling. Since then his progress to green/yellow purulent drainage from his nose, posterior pharynx and some lower respiratory involvement as well. Some chills and myalgias but no high fevers. No major shortness of breath. Recently has developed some purulent eye drainage. He is recently had cataract surgery.      Acute illness concerns: URI  Onset: 3 weeks    Fever: no    Chills/Sweats: YES    Headache (location?): YES    Sinus Pressure:YES    Conjunctivitis:  YES: both    Ear Pain: no    Rhinorrhea: YES    Congestion: YES    Sore Throat: no      Cough: YES-productive of clear sputum, productive of yellow sputum, productive of green sputum    Wheeze: YES    Decreased Appetite: YES    Nausea: no     Vomiting: no     Diarrhea:  no     Dysuria/Freq.: no     Fatigue/Achiness: no     Sick/Strep Exposure: YES     Therapies Tried and outcome: Cepacol, Nyquil, Mucinex, helps \"a little bit\"        Problem list and histories reviewed & adjusted, as indicated.  Additional history: as documented            ROS:   ROS: 10 point ROS neg other than the symptoms noted above in the HPI.      OBJECTIVE:                                                    /78  Pulse 73  Temp 98.1  F (36.7  C) (Temporal)  Resp 16  Wt 240 lb (108.9 kg)  SpO2 97%  BMI 36.8 kg/m2  Body mass index is 36.8 kg/(m^2).  GENERAL: healthy, alert and no distress  EYES: Eyes grossly normal to inspection, extraocular movements - intact, and PERRL  EYES: Slight drainage noted from the left eye but eyes appear otherwise normal.  HENT: ear canals and TM's normal and slight redness of the soft palate and uvula without major edema. No sinus tenderness noted  NECK: no tenderness, no " adenopathy, no asymmetry, no masses, no stiffness; thyroid- normal to palpation  RESP: lungs clear to auscultation - no rales, no rhonchi, no wheezes  CV: regular rates and rhythm, normal S1 S2, no S3 or S4 and no murmur, no click or rub -  ABDOMEN: soft, no tenderness, no  hepatosplenomegaly, no masses, normal bowel sounds  MS: extremities- no gross deformities noted, no edema  NEURO: strength and tone- normal, sensory exam- grossly normal, mentation- intact, speech- normal, reflexes- symmetric  PSYCH: Alert and oriented times 3; speech- coherent , normal rate and volume; able to articulate logical thoughts, able to abstract reason, no tangential thoughts, no hallucinations or delusions, affect- normal    Diagnostic test results:  Diagnostic Test Results:  none        ASSESSMENT/PLAN:                                                    1. Acute recurrent maxillary sinusitis  Given the length of time, will treat empirically with oral Ceftin. (Pharmacy called to say that Ceftin was not available but Cefzil was this was approved).  - cefuroxime (CEFTIN) 250 MG tablet; Two tablets twice daily for five days then one tablet twice daily till finished  Dispense: 30 tablet; Refill: 0    2. Acute bronchitis, unspecified organism  Empiric treatment for bacterial infections as noted above with the switch to Cefzil.  - cefuroxime (CEFTIN) 250 MG tablet; Two tablets twice daily for five days then one tablet twice daily till finished  Dispense: 30 tablet; Refill: 0      Follow up with Provider - Follow-up as needed or return for ongoing care.   See Patient Instructions    The patient understood the rational for the diagnosis and treatment plan. All questions were answered to best of my ability and the patient's satisfaction.    Note: Chart documentation done in part with Dragon Voice Recognition software. Although reviewed after completion, some word and grammatical errors may remain.  Please consider this when interpreting  information in this chart.    Vinod Hercules MD  [facility]

## 2017-12-05 ENCOUNTER — OFFICE VISIT (OUTPATIENT)
Dept: FAMILY MEDICINE | Facility: CLINIC | Age: 67
End: 2017-12-05
Payer: COMMERCIAL

## 2017-12-05 VITALS
OXYGEN SATURATION: 97 % | DIASTOLIC BLOOD PRESSURE: 78 MMHG | RESPIRATION RATE: 16 BRPM | HEART RATE: 73 BPM | WEIGHT: 240 LBS | TEMPERATURE: 98.1 F | SYSTOLIC BLOOD PRESSURE: 140 MMHG | BODY MASS INDEX: 36.8 KG/M2

## 2017-12-05 DIAGNOSIS — J20.9 ACUTE BRONCHITIS, UNSPECIFIED ORGANISM: ICD-10-CM

## 2017-12-05 DIAGNOSIS — J01.01 ACUTE RECURRENT MAXILLARY SINUSITIS: Primary | ICD-10-CM

## 2017-12-05 PROCEDURE — 99213 OFFICE O/P EST LOW 20 MIN: CPT | Performed by: FAMILY MEDICINE

## 2017-12-05 RX ORDER — CEFUROXIME AXETIL 250 MG/1
TABLET ORAL
Qty: 30 TABLET | Refills: 0 | Status: SHIPPED | OUTPATIENT
Start: 2017-12-05 | End: 2017-12-22

## 2017-12-05 ASSESSMENT — PAIN SCALES - GENERAL: PAINLEVEL: NO PAIN (0)

## 2017-12-05 NOTE — PATIENT INSTRUCTIONS
These are new changes to your current plan of care based on today's visit:    Medications stopped    Medications to be started Ceftin as directed   Change dose of this medication    New treatments        Follow up appointments:    1.  FOLLOW UP WITH YOUR PRIMARY CARE PROVIDER: As needed    Vinod Hercules MD

## 2017-12-05 NOTE — MR AVS SNAPSHOT
After Visit Summary   12/5/2017    Driss Pope    MRN: [id number]           Patient Information     Date Of Birth          1950        Visit Information        Provider Department      12/5/2017 1:00 PM Vinod Hercules MD [facility]        Today's Diagnoses     Acute recurrent maxillary sinusitis    -  1    Acute bronchitis, unspecified organism          Care Instructions    These are new changes to your current plan of care based on today's visit:    Medications stopped    Medications to be started Ceftin as directed   Change dose of this medication    New treatments        Follow up appointments:    1.  FOLLOW UP WITH YOUR PRIMARY CARE PROVIDER: As needed    Vinod Hercules MD                Follow-ups after your visit        Follow-up notes from your care team     Return if symptoms worsen or fail to improve.      Your next 10 appointments already scheduled     Oct 09, 2018  9:00 AM CDT   LAB with LAB FIRST FLOOR [facility]    [address] [place] [id number] [phone]           Please do not eat 10-12 hours before your appointment if you are coming in fasting for labs on lipids, cholesterol, or glucose (sugar). This does not apply to pregnant women. Water, hot tea and black coffee (with nothing added) are okay. Do not drink other fluids, diet soda or chew gum.            Oct 09, 2018  9:30 AM CDT   [facility] Fitzgerald [facility]   [facility]    [address] [place] [id number] [phone]              Who to contact     If you have questions or need follow up information about today's clinic visit or your schedule please contact [facility] [place] directly at [phone].  Normal or non-critical lab and imaging results will be communicated to you by MyChart, letter or phone within 4 business days after the clinic  has received the results. If you do not hear from us within 7 days, please contact the clinic through [employer] or phone. If you have a critical or abnormal lab result, we will notify you by phone as soon as possible.  Submit refill requests through [employer] or call your pharmacy and they will forward the refill request to us. Please allow 3 business days for your refill to be completed.          Additional Information About Your Visit        [employer] Information     [employer] gives you secure access to your electronic health record. If you see a primary care provider, you can also send messages to your care team and make appointments. If you have questions, please call your primary care clinic.  If you do not have a primary care provider, please call [phone] and they will assist you.        Care EveryWhere ID     This is your Care EveryWhere ID. This could be used by other organizations to access your [facility] medical records  [id number]        Your Vitals Were     Pulse Temperature Respirations Pulse Oximetry BMI (Body Mass Index)       73 98.1  F (36.7  C) (Temporal) 16 97% 36.8 kg/m2        Blood Pressure from Last 3 Encounters:   12/05/17 140/78   10/16/17 135/80   10/10/17 (!) 139/92    Weight from Last 3 Encounters:   12/05/17 240 lb (108.9 kg)   10/16/17 237 lb (107.5 kg)   10/10/17 238 lb 11.2 oz (108.3 kg)              Today, you had the following     No orders found for display         Today's Medication Changes          These changes are accurate as of: 12/5/17  1:21 PM.  If you have any questions, ask your nurse or doctor.               Start taking these medicines.        Dose/Directions    cefuroxime 250 MG tablet   Commonly known as:  CEFTIN   Used for:  Acute bronchitis, unspecified organism, Acute recurrent maxillary sinusitis   Started by:  Vinod Hercules MD        Two tablets twice daily for five days then one tablet twice daily till finished   Quantity:  30 tablet   Refills:  0             Where to get your medicines      These medications were sent to [id number] - [place], [place] - [address]  [address], [facility] [address]     Phone:  [phone]     cefuroxime 250 MG tablet                Primary Care Provider Office Phone # Fax #    Vinod Trey Hercules [phone] [address]  [place] [address]        Equal Access to Services     YESSICA KIRAN : Hadii aad ku hadasho Soomaali, waaxda luqadaha, qaybta kaalmada adeegyada, waxay idiin hayaan adeeg kharash la'tomn . So [facility] [place] [facility] [phone].    ATENCIÓN: Si habla español, tiene a fitzgerald disposición servicios gratuitos de asistencia lingüística. [facility] [phone].    We comply with applicable federal civil rights laws and [place] laws. We do not discriminate on the basis of race, color, national origin, age, disability, sex, sexual orientation, or gender identity.            Thank you!     Thank you for choosing [facility]  for your care. Our goal is always to provide you with excellent care. Hearing back from our patients is one way we can continue to improve our services. Please take a few minutes to complete the written survey that you may receive in the mail after your visit with us. Thank you!             Your Updated Medication List - Protect others around you: Learn how to safely use, store and throw away your medicines at www.disposemymeds.org.          This list is accurate as of: 12/5/17  1:21 PM.  Always use your most recent med list.                   Brand Name Dispense Instructions for use Diagnosis    aspirin 325 MG EC tablet     100 tablet    Take 1 tablet by mouth daily.    CKD (chronic kidney disease) stage 2, GFR 60-89 ml/min, Hypertension goal BP (blood pressure) < 130/80       atorvastatin 20 MG tablet    LIPITOR    90 tablet    Take 1 tablet (20 mg) by mouth daily    Hyperlipidemia LDL goal <100, CKD (chronic kidney disease) stage 3, GFR 30-59  ml/min       cefuroxime 250 MG tablet    CEFTIN    30 tablet    Two tablets twice daily for five days then one tablet twice daily till finished    Acute bronchitis, unspecified organism, Acute recurrent maxillary sinusitis       CIALIS 20 MG tablet   Generic drug:  tadalafil     9 tablet    Take 1/2 to 1 tablet by  mouth daily as needed,  never use with  nitroglycerin, terazosin,  or doxazosin    ED (erectile dysfunction)       gatifloxacin 0.5 % ophthalmic solution    ZYMAXID     INT 1 GTT SURGICAL EYE FOUR TIMES DAILY. START 1 DAY B SURGERY AND CONT UNTIL BOTTLE IS EMPTY. NTE 4 WEEKS.        GLUCOSAMINE CHONDROITIN ADV PO      2-3 tablets daily.        ketorolac 0.5 % ophthalmic solution    ACULAR     INT 1 GTT SURGICAL EYE FOUR TIMES DAILY. START 1 DAY B SURGERY AND. CONT UNTIL BOTTLE IS EMPTY. NTE 4 WKS        Krill Oil 1000 MG Caps      Take by mouth daily        losartan 50 MG tablet    COZAAR    90 tablet    Take 1 tablet (50 mg) by mouth daily    Hypertension, goal below 140/90, CKD (chronic kidney disease) stage 3, GFR 30-59 ml/min       MULTIVITAL PO      Once daily.        prednisoLONE acetate 1 % ophthalmic susp    PRED FORTE          tamsulosin 0.4 MG capsule    FLOMAX    180 capsule    TAKE 2 CAPSULES EVERY DAY    Benign non-nodular prostatic hyperplasia with lower urinary tract symptoms

## 2017-12-22 ENCOUNTER — OFFICE VISIT (OUTPATIENT)
Dept: FAMILY MEDICINE | Facility: CLINIC | Age: 67
End: 2017-12-22
Payer: COMMERCIAL

## 2017-12-22 VITALS
DIASTOLIC BLOOD PRESSURE: 80 MMHG | BODY MASS INDEX: 36.55 KG/M2 | SYSTOLIC BLOOD PRESSURE: 136 MMHG | WEIGHT: 238.4 LBS | HEART RATE: 66 BPM | TEMPERATURE: 98.1 F | OXYGEN SATURATION: 95 %

## 2017-12-22 DIAGNOSIS — H65.03 BILATERAL ACUTE SEROUS OTITIS MEDIA, RECURRENCE NOT SPECIFIED: Primary | ICD-10-CM

## 2017-12-22 PROCEDURE — 99213 OFFICE O/P EST LOW 20 MIN: CPT | Performed by: PHYSICIAN ASSISTANT

## 2017-12-22 NOTE — PROGRESS NOTES
SUBJECTIVE:   Driss Pope is a 67 year old male who presents to clinic today for the following health issues:      Concern - Ears, FEEL BLOCKED.    Onset: 6 weeks ago    Description:   Both ear    Intensity: mild    Progression of Symptoms:  same    Accompanying Signs & Symptoms:  none    Previous history of similar problem:   none    Precipitating factors:   Worsened by: none    Alleviating factors:  Improved by: none    Therapies Tried and outcome: none    tx'd 3 weeks ago for sinusitis.  DID IMPROVE, EXCEPT FOR EARS             Allergies   Allergen Reactions     Lisinopril Cough     Persistent cough with Lisinopril       Patient Active Problem List   Diagnosis     Hyperlipidemia LDL goal <100     Hypertriglyceridemia     CKD (chronic kidney disease) stage 3, GFR 30-59 ml/min     Advanced directives, counseling/discussion     Abnormal PSA     Lower extremity edema     ED (erectile dysfunction)     Benign non-nodular prostatic hyperplasia with lower urinary tract symptoms     Hypertension, goal below 140/90     Morbid obesity (H)       Past Medical History:   Diagnosis Date     CKD (chronic kidney disease)      Hyperlipidemia LDL goal < 100      Hypertension goal BP (blood pressure) < 130/80      Hypertriglyceridemia      Obesity      Osteoarthritis          Current Outpatient Prescriptions on File Prior to Visit:  tamsulosin (FLOMAX) 0.4 MG capsule TAKE 2 CAPSULES EVERY DAY   losartan (COZAAR) 50 MG tablet Take 1 tablet (50 mg) by mouth daily   atorvastatin (LIPITOR) 20 MG tablet Take 1 tablet (20 mg) by mouth daily   CIALIS 20 MG tablet Take 1/2 to 1 tablet by  mouth daily as needed,  never use with  nitroglycerin, terazosin,  or doxazosin (Patient not taking: Reported on 10/16/2017)   Krill Oil 1000 MG CAPS Take by mouth daily   aspirin 325 MG EC tablet Take 1 tablet by mouth daily.   Misc Natural Products (GLUCOSAMINE CHONDROITIN ADV PO) 2-3 tablets daily.    Multiple Vitamins-Minerals (MULTIVITAL PO)  Once daily.      No current facility-administered medications on file prior to visit.     Social History   Substance Use Topics     Smoking status: Never Smoker     Smokeless tobacco: Never Used     Alcohol use Yes      Comment: occasionally       ROS:   Constitutional: no fevers  ENT: as above  Cardio hx HTN    OBJECTIVE:  /80 (BP Location: Left arm, Patient Position: Chair, Cuff Size: Adult Large)  Pulse 66  Temp 98.1  F (36.7  C) (Oral)  Wt 238 lb 6.4 oz (108.1 kg)  SpO2 95%  BMI 36.55 kg/m2   General:   awake, alert, and cooperative.  NAD.   Head: Normocephalic, atraumatic.  Eyes: Conjunctiva clear,   ENT: . RT Canal clear, TM intact and clear, LEFT CANAL clear TM intact and clear, no throat redness or swelling  Neuro: Alert and oriented - normal speech.    ASSESSMENT:    ICD-10-CM    1. Bilateral acute serous otitis media, recurrence not specified H65.03        PLAN:  Watchful waiting, will refer to ENT if not improving over next 3-4 weeks.    Patient would like to f/u with pcp for HM.  Advised about symptoms which might herald more serious problems.

## 2017-12-22 NOTE — PATIENT INSTRUCTIONS
At [facility], we strive to deliver an exceptional experience to you, every time we see you.  If you receive a survey in the mail, please send us back your thoughts. We really do value your feedback.    Based on your medical history, these are the current health maintenance/preventive care services that you are due for (some may have been done at this visit.)  Health Maintenance Due   Topic Date Due     MICROALBUMIN Q1 YEAR  06/30/2017     PNEUMO 5YR BOOST DUE AFTER AGE 65 (NO IB MSG) (2) 09/25/2017     PNEUMOCOCCAL (2 of 2 - PPSV23) 09/25/2017     ADVANCE DIRECTIVE PLANNING Q5 YRS  09/25/2017     LIPID MONITORING Q1 YEAR  01/02/2018         Suggested websites for health information:  Www.[employer].org : Up to date and easily searchable information on multiple topics.  Www.[employer].gov : medication info, interactive tutorials, watch real surgeries online  Www.familydoctor.org : good info from the Academy of Family Physicians  Www.cdc.gov : public health info, travel advisories, epidemics (H1N1)  Www.aap.org : children's health info, normal development, vaccinations  Www.health.[facility].[place].us : [place] dept of health, public health issues in [place], N1N1    Your care team:                            Family Medicine Internal Medicine   MD Ernie Reid MD Shantel Branch-Fleming, MD Katya Georgiev PA-C Nam Ho, MD Pediatrics   KELLY Gates, MD Brenda Hernandez CNP, MD Deborah Mielke, MD Kim Thein, APRN CNP      Clinic hours: Monday - Thursday 7 am-7 pm; Fridays 7 am-5 pm.   Urgent care: Monday - Friday 11 am-9 pm; Saturday and Sunday 9 am-5 pm.  Pharmacy : Monday -Thursday 8 am-8 pm; Friday 8 am-6 pm; Saturday and Sunday 9 am-5 pm.     Clinic: [phone]   Pharmacy: [phone]    Earache, No Infection (Adult)  Earaches can happen without an infection. This occurs when air and fluid build up behind the eardrum  causing a feeling of fullness and discomfort and reduced hearing. This is called otitis media with effusion (OME) or serous otitis media. It means there is fluid in the middle ear. It is not the same as acute otitis media, which is typically from infection.  OME can happen when you have a cold if congestion blocks the passage that drains the middle ear. This passage is called the eustachian tube. OME may also occur with nasal allergies or after a bacterial middle ear infection.    The pain or discomfort may come and go. You may hear clicking or popping sounds when you chew or swallow. You may feel that your balance is off. Or you may hear ringing in the ear.  It often takes from several weeks up to 3 months for the fluid to clear on its own. Oral pain relievers and ear drops help if there is pain. Decongestants and antihistamines sometimes help. Antibiotics don't help since there is no infection. Your doctor may prescribe a nasal spray to help reduce swelling in the nose and eustachian tube. This can allow the ear to drain.  If your OME doesn't improve after 3 months, surgery may be used to drain the fluid and insert a small tube in the eardrum to allow continued drainage.  Because the middle ear fluid can become infected, it is important to watch for signs of an ear infection which may develop later. These signs include increased ear pain, fever, or drainage from the ear.  Home care  The following guidelines will help you care for yourself at home:    You may use over-the-counter medicine as directed to control pain, unless another medicine was prescribed. If you have chronic liver or kidney disease or ever had a stomach ulcer or GI bleeding, talk with your doctor before using these medicines. Aspirin should never be used in anyone under 18 years of age who is ill with a fever. It may cause severe liver damage.    You may use over-the-counter decongestants such as phenylephrine or pseudoephedrine. But they are not  always helpful. Don't use nasal spray decongestants more than 3 days. Longer use can make congestion worse. Prescription nasal sprays from your doctor don't typically have those restrictions.    Antihistamines may help if you are also having allergy symptoms.    You may use medicines such as guaifenesin to thin mucus and promote drainage.  Follow-up care  Follow up with your healthcare provider or as advised if you are not feeling better after 3 days.  When to seek medical advice  Call your healthcare provider right away if any of the following occur:    Your ear pain gets worse or does not start to improve     Fever of 100.4 F (38 C) or higher, or as directed by your healthcare provider    Fluid or blood draining from the ear    Headache or sinus pain    Stiff neck    Unusual drowsiness or confusion  Date Last Reviewed: 10/1/2016    2387-9979 [employer]. [address] [place] [address]. All rights reserved. This information is not intended as a substitute for professional medical care. Always follow your healthcare professional's instructions.

## 2017-12-22 NOTE — NURSING NOTE
"Chief Complaint   Patient presents with     Ear Problem     started with a cold about 6 weeks ago. Pt was on antibiotic but his both ears are still plugged.       Initial /86 (BP Location: Left arm, Patient Position: Chair, Cuff Size: Adult Regular)  Pulse 66  Temp 98.1  F (36.7  C) (Oral)  Wt 238 lb 6.4 oz (108.1 kg)  SpO2 95%  BMI 36.55 kg/m2 Estimated body mass index is 36.55 kg/(m^2) as calculated from the following:    Height as of 10/16/17: 5' 7.72\" (1.72 m).    Weight as of this encounter: 238 lb 6.4 oz (108.1 kg).  Medication Reconciliation: complete   Darrin Phan MA        "

## 2017-12-22 NOTE — MR AVS SNAPSHOT
After Visit Summary   12/22/2017    Driss Pope    MRN: [id number]           Patient Information     Date Of Birth          1950        Visit Information        Provider Department      12/22/2017 11:20 AM Jalen Pickett PA [facility]        Today's Diagnoses     Bilateral acute serous otitis media, recurrence not specified    -  1      Care Instructions    At [facility], we strive to deliver an exceptional experience to you, every time we see you.  If you receive a survey in the mail, please send us back your thoughts. We really do value your feedback.    Based on your medical history, these are the current health maintenance/preventive care services that you are due for (some may have been done at this visit.)  Health Maintenance Due   Topic Date Due     MICROALBUMIN Q1 YEAR  06/30/2017     PNEUMO 5YR BOOST DUE AFTER AGE 65 (NO IB MSG) (2) 09/25/2017     PNEUMOCOCCAL (2 of 2 - PPSV23) 09/25/2017     ADVANCE DIRECTIVE PLANNING Q5 YRS  09/25/2017     LIPID MONITORING Q1 YEAR  01/02/2018         Suggested websites for health information:  Www.[employer]odemedia : Up to date and easily searchable information on multiple topics.  Www.medlineplus.gov : medication info, interactive tutorials, watch real surgeries online  Www.familydoctor.org : good info from the Academy of Family Physicians  Www.cdc.gov : public health info, travel advisories, epidemics (H1N1)  Www.aap.org : children's health info, normal development, vaccinations  Www.health.[facility].[place].us : [place] dept of health, public health issues in [place], N1N1    Your care team:                            Family Medicine Internal Medicine   MD Ernie Reid MD Shantel Branch-Fleming, MD Katya Georgiev PA-C Nam Ho, MD Pediatrics   Steve Pickett, PAAMPARO Gannon, CNP Evelia Jaeger APRN CNP   MD Brenda Burns MD Deborah Mielke, MD Kim Thein, APRN CNP       Clinic hours: Monday - Thursday 7 am-7 pm; Fridays 7 am-5 pm.   Urgent care: Monday - Friday 11 am-9 pm; Saturday and Sunday 9 am-5 pm.  Pharmacy : Monday -Thursday 8 am-8 pm; Friday 8 am-6 pm; Saturday and Sunday 9 am-5 pm.     Clinic: [phone]   Pharmacy: [phone]    Earache, No Infection (Adult)  Earaches can happen without an infection. This occurs when air and fluid build up behind the eardrum causing a feeling of fullness and discomfort and reduced hearing. This is called otitis media with effusion (OME) or serous otitis media. It means there is fluid in the middle ear. It is not the same as acute otitis media, which is typically from infection.  OME can happen when you have a cold if congestion blocks the passage that drains the middle ear. This passage is called the eustachian tube. OME may also occur with nasal allergies or after a bacterial middle ear infection.    The pain or discomfort may come and go. You may hear clicking or popping sounds when you chew or swallow. You may feel that your balance is off. Or you may hear ringing in the ear.  It often takes from several weeks up to 3 months for the fluid to clear on its own. Oral pain relievers and ear drops help if there is pain. Decongestants and antihistamines sometimes help. Antibiotics don't help since there is no infection. Your doctor may prescribe a nasal spray to help reduce swelling in the nose and eustachian tube. This can allow the ear to drain.  If your OME doesn't improve after 3 months, surgery may be used to drain the fluid and insert a small tube in the eardrum to allow continued drainage.  Because the middle ear fluid can become infected, it is important to watch for signs of an ear infection which may develop later. These signs include increased ear pain, fever, or drainage from the ear.  Home care  The following guidelines will help you care for yourself at home:    You may use over-the-counter medicine as directed to  control pain, unless another medicine was prescribed. If you have chronic liver or kidney disease or ever had a stomach ulcer or GI bleeding, talk with your doctor before using these medicines. Aspirin should never be used in anyone under 18 years of age who is ill with a fever. It may cause severe liver damage.    You may use over-the-counter decongestants such as phenylephrine or pseudoephedrine. But they are not always helpful. Don't use nasal spray decongestants more than 3 days. Longer use can make congestion worse. Prescription nasal sprays from your doctor don't typically have those restrictions.    Antihistamines may help if you are also having allergy symptoms.    You may use medicines such as guaifenesin to thin mucus and promote drainage.  Follow-up care  Follow up with your healthcare provider or as advised if you are not feeling better after 3 days.  When to seek medical advice  Call your healthcare provider right away if any of the following occur:    Your ear pain gets worse or does not start to improve     Fever of 100.4 F (38 C) or higher, or as directed by your healthcare provider    Fluid or blood draining from the ear    Headache or sinus pain    Stiff neck    Unusual drowsiness or confusion  Date Last Reviewed: 10/1/2016    2899-6284 [employer]. [address] [place] [address]. All rights reserved. This information is not intended as a substitute for professional medical care. Always follow your healthcare professional's instructions.                Follow-ups after your visit        Follow-up notes from your care team     Return if symptoms worsen or fail to improve.      Your next 10 appointments already scheduled     Oct 09, 2018  9:00 AM CDT   LAB with LAB FIRST FLOOR [facility] ([facility])    [address] [address] [place] [id number] [phone]           Please do not eat 10-12 hours before your appointment if you  are coming in fasting for labs on lipids, cholesterol, or glucose (sugar). This does not apply to pregnant women. Water, hot tea and black coffee (with nothing added) are okay. Do not drink other fluids, diet soda or chew gum.            Oct 09, 2018  9:30 AM CDT   [facility] Visit with Rosalie Fitzgerald MD   [facility]    [address] [place] [id number] [phone]              Who to contact     If you have questions or need follow up information about today's clinic visit or your schedule please contact [facility] directly at [phone].  Normal or non-critical lab and imaging results will be communicated to you by [employer], letter or phone within 4 business days after the clinic has received the results. If you do not hear from us within 7 days, please contact the clinic through [employer] or phone. If you have a critical or abnormal lab result, we will notify you by phone as soon as possible.  Submit refill requests through [employer] or call your pharmacy and they will forward the refill request to us. Please allow 3 business days for your refill to be completed.          Additional Information About Your Visit        [email or web address] Information     [employer] gives you secure access to your electronic health record. If you see a primary care provider, you can also send messages to your care team and make appointments. If you have questions, please call your primary care clinic.  If you do not have a primary care provider, please call [phone] and they will assist you.        Care EveryWhere ID     This is your Care EveryWhere ID. This could be used by other organizations to access your [facility] medical records  [id number]        Your Vitals Were     Pulse Temperature Pulse Oximetry BMI (Body Mass Index)          66 98.1  F (36.7  C) (Oral) 95% 36.55 kg/m2         Blood Pressure from Last 3 Encounters:   12/22/17 136/80   12/05/17  140/78   10/16/17 135/80    Weight from Last 3 Encounters:   12/22/17 238 lb 6.4 oz (108.1 kg)   12/05/17 240 lb (108.9 kg)   10/16/17 237 lb (107.5 kg)              Today, you had the following     No orders found for display       Primary Care Provider Office Phone # Fax #    Vinod Hercules [phone] [address] [facility] [address]        Equal Access to Services     YESSICA KIRAN : Hadii aad ku hadasho Soomaali, waaxda luqadaha, qaybta kaalmada adeegyada, waxay idiin hayaan adeeg kharashanae dexter . So [facility] [phone].    ATENCIÓN: Si habla español, tiene a fitzgerald disposición servicios gratuitos de asistencia lingüística. Llame al [phone].    We comply with applicable federal civil rights laws and [place] laws. We do not discriminate on the basis of race, color, national origin, age, disability, sex, sexual orientation, or gender identity.            Thank you!     Thank you for choosing [facility]  for your care. Our goal is always to provide you with excellent care. Hearing back from our patients is one way we can continue to improve our services. Please take a few minutes to complete the written survey that you may receive in the mail after your visit with us. Thank you!             Your Updated Medication List - Protect others around you: Learn how to safely use, store and throw away your medicines at www.disposemymeds.org.          This list is accurate as of: 12/22/17 11:37 AM.  Always use your most recent med list.                   Brand Name Dispense Instructions for use Diagnosis    aspirin 325 MG EC tablet     100 tablet    Take 1 tablet by mouth daily.    CKD (chronic kidney disease) stage 2, GFR 60-89 ml/min, Hypertension goal BP (blood pressure) < 130/80       atorvastatin 20 MG tablet    LIPITOR    90 tablet    Take 1 tablet (20 mg) by mouth daily    Hyperlipidemia LDL goal <100, CKD (chronic kidney disease) stage 3, GFR 30-59 ml/min        CIALIS 20 MG tablet   Generic drug:  tadalafil     9 tablet    Take 1/2 to 1 tablet by  mouth daily as needed,  never use with  nitroglycerin, terazosin,  or doxazosin    ED (erectile dysfunction)       GLUCOSAMINE CHONDROITIN ADV PO      2-3 tablets daily.        Krill Oil 1000 MG Caps      Take by mouth daily        losartan 50 MG tablet    COZAAR    90 tablet    Take 1 tablet (50 mg) by mouth daily    Hypertension, goal below 140/90, CKD (chronic kidney disease) stage 3, GFR 30-59 ml/min       MULTIVITAL PO      Once daily.        tamsulosin 0.4 MG capsule    FLOMAX    180 capsule    TAKE 2 CAPSULES EVERY DAY    Benign non-nodular prostatic hyperplasia with lower urinary tract symptoms

## 2018-01-30 DIAGNOSIS — N18.30 CKD (CHRONIC KIDNEY DISEASE) STAGE 3, GFR 30-59 ML/MIN (H): ICD-10-CM

## 2018-01-30 DIAGNOSIS — E78.5 HYPERLIPIDEMIA LDL GOAL <100: ICD-10-CM

## 2018-01-30 DIAGNOSIS — N40.1 BENIGN NON-NODULAR PROSTATIC HYPERPLASIA WITH LOWER URINARY TRACT SYMPTOMS: ICD-10-CM

## 2018-01-30 DIAGNOSIS — I10 HYPERTENSION, GOAL BELOW 140/90: ICD-10-CM

## 2018-02-01 RX ORDER — ATORVASTATIN CALCIUM 20 MG/1
TABLET, FILM COATED ORAL
Qty: 30 TABLET | Refills: 0 | Status: SHIPPED | OUTPATIENT
Start: 2018-02-01 | End: 2018-03-15

## 2018-02-01 RX ORDER — TAMSULOSIN HYDROCHLORIDE 0.4 MG/1
CAPSULE ORAL
Qty: 180 CAPSULE | Refills: 2 | Status: SHIPPED | OUTPATIENT
Start: 2018-02-01 | End: 2018-11-08

## 2018-02-01 RX ORDER — LOSARTAN POTASSIUM 50 MG/1
TABLET ORAL
Qty: 90 TABLET | Refills: 1 | Status: SHIPPED | OUTPATIENT
Start: 2018-02-01 | End: 2018-09-04

## 2018-02-01 NOTE — TELEPHONE ENCOUNTER
Lipitor:  Medication is being filled for 1 time refill only due to:  Patient needs labs fasting.  Flomax:  Prescription approved per [facility] Refill Protocol.  Cozaar:  Routing refill request to provider for review/approval because:  Labs out of range:  Creatinine    Vianney Geiger, RN, BSN

## 2018-03-15 DIAGNOSIS — E78.5 HYPERLIPIDEMIA LDL GOAL <100: ICD-10-CM

## 2018-03-15 DIAGNOSIS — N18.30 CKD (CHRONIC KIDNEY DISEASE) STAGE 3, GFR 30-59 ML/MIN (H): ICD-10-CM

## 2018-03-15 NOTE — TELEPHONE ENCOUNTER
atorvastatin (LIPITOR) 20 MG tablet  Recent Labs   Lab Test  01/02/17   0910  06/30/16   0814   06/15/15   0842  10/27/14   0808   CHOL  184  148   < >  156  161   HDL  67  73   < >  51  50   LDL  92  62   < >  79  81   TRIG  125  63   < >  129  150   CHOLHDLRATIO   --    --    --   3.1  3.2    < > = values in this interval not displayed.     Routing refill request to provider for review/approval because:  Angela given x1 and patient did not follow up, please advise  Labs out of range:  creatinine  Labs not current:  Fasting lipid panel    Blanquita Gates, RN, BSN

## 2018-03-16 RX ORDER — ATORVASTATIN CALCIUM 20 MG/1
TABLET, FILM COATED ORAL
Qty: 90 TABLET | Refills: 0 | Status: SHIPPED | OUTPATIENT
Start: 2018-03-16 | End: 2018-06-16

## 2018-03-16 NOTE — TELEPHONE ENCOUNTER
Refilled for 3 months --does need to schedule a clinic visit with fasting blood studies--last lipid follow up in January 2017.     Assist in scheduling as needed.    Vinod Hercules MD

## 2018-03-20 NOTE — PROGRESS NOTES
SUBJECTIVE:   Driss Pope is a 67 year old male who presents to clinic today for the following health issues:      History of Present Illness     CKD:     NSAID use::  None    Hyperlipidemia:     Low fat/chol diet rating::  Good    Taking Statins::  YES    Side effects from hypolipidemia medication::  No muscle aches from Statin    Lipid Medications or Supplements::  Other    Hypertension:     Outpatient blood pressures:  Are being checked    Blood pressures checked at:  Home    Dietary sodium intake::  No added salt diet    Diet:  Regular (no restrictions)  Frequency of exercise:  2-3 days/week  Duration of exercise:  30-45 minutes  Taking medications regularly:  Yes  Medication side effects:  Other  Additional concerns today:  No      Problem list and histories reviewed & adjusted, as indicated.  Additional history: as documented        Current Outpatient Prescriptions   Medication Sig Dispense Refill     atorvastatin (LIPITOR) 20 MG tablet TAKE 1 TABLET EVERY DAY (LABS AND FASTING NEEDED) 90 tablet 0     losartan (COZAAR) 50 MG tablet TAKE 1 TABLET EVERY DAY 90 tablet 1     tamsulosin (FLOMAX) 0.4 MG capsule TAKE 2 CAPSULES EVERY  capsule 2     Krill Oil 1000 MG CAPS Take by mouth daily       aspirin 325 MG EC tablet Take 1 tablet by mouth daily. 100 tablet 3     Misc Natural Products (GLUCOSAMINE CHONDROITIN ADV PO) 2-3 tablets daily.        Multiple Vitamins-Minerals (MULTIVITAL PO) Once daily.        CIALIS 20 MG tablet Take 1/2 to 1 tablet by  mouth daily as needed,  never use with  nitroglycerin, terazosin,  or doxazosin (Patient not taking: Reported on 10/16/2017) 9 tablet 0       ROS:  CONSTITUTIONAL: NEGATIVE for fever, chills, change in weight  CONSTITUTIONAL: Continues a moderately overweight  INTEGUMENTARY/SKIN: NEGATIVE for worrisome rashes, moles or lesions  EYES: NEGATIVE for vision changes or irritation  ENT/MOUTH: NEGATIVE for ear, mouth and throat problems  RESP: NEGATIVE for  "significant cough or SOB  BREAST: NEGATIVE for masses, tenderness or discharge  CV: NEGATIVE for chest pain, palpitations or peripheral edema  CV: No reported anginal symptoms with his dancing and exercise.  GI: NEGATIVE for nausea, abdominal pain, heartburn, or change in bowel habits  : NEGATIVE for frequency, dysuria, or hematuria   male : Continues with care through Nephrology for chronic kidney disease which is been relatively stable.  MUSCULOSKELETAL: NEGATIVE for significant arthralgias or myalgia  NEURO: NEGATIVE for weakness, dizziness or paresthesias  ENDOCRINE: NEGATIVE for temperature intolerance, skin/hair changes  HEME: NEGATIVE for bleeding problems  PSYCHIATRIC: NEGATIVE for changes in mood or affect    OBJECTIVE:                                                    /80  Pulse 70  Temp 97.5  F (36.4  C) (Temporal)  Resp 14  Ht 5' 8\" (1.727 m)  Wt 239 lb (108.4 kg)  BMI 36.34 kg/m2  Body mass index is 36.34 kg/(m^2).  GENERAL: healthy, alert and no distress  GENERAL: over weight  EYES: Eyes grossly normal to inspection, extraocular movements - intact, and PERRL  HENT: ear canals- normal; TMs- normal; Nose- normal; Mouth- no ulcers, no lesions  NECK: no tenderness, no adenopathy, no asymmetry, no masses, no stiffness; thyroid- normal to palpation  RESP: lungs clear to auscultation - no rales, no rhonchi, no wheezes  CV: regular rates and rhythm, normal S1 S2, no S3 or S4 and no murmur, no click or rub -  ABDOMEN: soft, no tenderness, no  hepatosplenomegaly, no masses, normal bowel sounds  MS: extremities- no gross deformities noted, no edema  SKIN: no suspicious lesions, no rashes  NEURO: strength and tone- normal, sensory exam- grossly normal, mentation- intact, speech- normal, reflexes- symmetric  BACK: no CVA tenderness, no paralumbar tenderness  PSYCH: Alert and oriented times 3; speech- coherent , normal rate and volume; able to articulate logical thoughts, able to abstract reason, no " tangential thoughts, no hallucinations or delusions, affect- normal  LYMPHATICS: ant. cervical- normal, post. cervical- normal, axillary- normal, supraclavicular- normal, inguinal- normal    Diagnostic test results:  Diagnostic Test Results:  Results for orders placed or performed in visit on 03/26/18 (from the past 24 hour(s))   Lipid panel reflex to direct LDL Fasting   Result Value Ref Range    Cholesterol 164 <200 mg/dL    Triglycerides 212 (H) <150 mg/dL    HDL Cholesterol 50 >39 mg/dL    LDL Cholesterol Calculated 72 <100 mg/dL    Non HDL Cholesterol 114 <130 mg/dL   Comprehensive metabolic panel   Result Value Ref Range    Sodium 142 133 - 144 mmol/L    Potassium 4.3 3.4 - 5.3 mmol/L    Chloride 109 94 - 109 mmol/L    Carbon Dioxide 28 20 - 32 mmol/L    Anion Gap 5 3 - 14 mmol/L    Glucose 98 70 - 99 mg/dL    Urea Nitrogen 25 7 - 30 mg/dL    Creatinine 1.36 (H) 0.66 - 1.25 mg/dL    GFR Estimate 52 (L) >60 mL/min/1.7m2    GFR Estimate If Black 63 >60 mL/min/1.7m2    Calcium 9.2 8.5 - 10.1 mg/dL    Bilirubin Total 0.5 0.2 - 1.3 mg/dL    Albumin 3.8 3.4 - 5.0 g/dL    Protein Total 6.8 6.8 - 8.8 g/dL    Alkaline Phosphatase 74 40 - 150 U/L    ALT 24 0 - 70 U/L    AST 18 0 - 45 U/L   Hemoglobin A1c   Result Value Ref Range    Hemoglobin A1C 5.4 4.3 - 6.0 %        ASSESSMENT/PLAN:                                                    1. Hypertension, goal below 140/90  Adequate blood pressure control.  - Comprehensive metabolic panel    2. CKD (chronic kidney disease) stage 3, GFR 30-59 ml/min  Stable and followed through his nephrologist.  - Lipid panel reflex to direct LDL Fasting  - Comprehensive metabolic panel    3. Hyperlipidemia LDL goal <100  Lipid control is adequate.  - Lipid panel reflex to direct LDL Fasting  - Comprehensive metabolic panel    4. Morbid obesity (H)  Long-term weight loss recommended    5. Need for 23-polyvalent pneumococcal polysaccharide vaccine  Completion of these pneumococcal  vaccinations.  - Pneumococcal vaccine 23 valent PPSV23  (Pneumovax) [63803]  - ADMIN: Vaccine, Initial (70328)    6. Advanced directives, counseling/discussion  Information given.    7. Benign non-nodular prostatic hyperplasia with lower urinary tract symptoms  Appears to be stable with a slower stream.    8. Hyperglycemia  Hyperglycemia appears to be stable and/or resolved with an excellent A1c.  - Hemoglobin A1c      Follow up with Provider -Follow-up in 6 months or as needed.  Would check PSA and prostate exam at that point.  See Patient Instructions    The patient understood the rational for the diagnosis and treatment plan. All questions were answered to best of my ability and the patient's satisfaction.    Note: Chart documentation done in part with Dragon Voice Recognition software. Although reviewed after completion, some word and grammatical errors may remain.  Please consider this when interpreting information in this chart.    Vinod Hercules MD  [facility]

## 2018-03-26 ENCOUNTER — OFFICE VISIT (OUTPATIENT)
Dept: FAMILY MEDICINE | Facility: CLINIC | Age: 68
End: 2018-03-26
Payer: COMMERCIAL

## 2018-03-26 VITALS
HEIGHT: 68 IN | SYSTOLIC BLOOD PRESSURE: 130 MMHG | DIASTOLIC BLOOD PRESSURE: 80 MMHG | RESPIRATION RATE: 14 BRPM | TEMPERATURE: 97.5 F | WEIGHT: 239 LBS | HEART RATE: 70 BPM | BODY MASS INDEX: 36.22 KG/M2

## 2018-03-26 DIAGNOSIS — N40.1 BENIGN NON-NODULAR PROSTATIC HYPERPLASIA WITH LOWER URINARY TRACT SYMPTOMS: ICD-10-CM

## 2018-03-26 DIAGNOSIS — E78.5 HYPERLIPIDEMIA LDL GOAL <100: ICD-10-CM

## 2018-03-26 DIAGNOSIS — N18.30 CKD (CHRONIC KIDNEY DISEASE) STAGE 3, GFR 30-59 ML/MIN (H): ICD-10-CM

## 2018-03-26 DIAGNOSIS — Z71.89 ADVANCED DIRECTIVES, COUNSELING/DISCUSSION: ICD-10-CM

## 2018-03-26 DIAGNOSIS — E66.01 MORBID OBESITY (H): ICD-10-CM

## 2018-03-26 DIAGNOSIS — R73.9 HYPERGLYCEMIA: ICD-10-CM

## 2018-03-26 DIAGNOSIS — Z23 NEED FOR 23-POLYVALENT PNEUMOCOCCAL POLYSACCHARIDE VACCINE: ICD-10-CM

## 2018-03-26 DIAGNOSIS — I10 HYPERTENSION, GOAL BELOW 140/90: Primary | ICD-10-CM

## 2018-03-26 LAB
ALBUMIN SERPL-MCNC: 3.8 G/DL (ref 3.4–5)
ALP SERPL-CCNC: 74 U/L (ref 40–150)
ALT SERPL W P-5'-P-CCNC: 24 U/L (ref 0–70)
ANION GAP SERPL CALCULATED.3IONS-SCNC: 5 MMOL/L (ref 3–14)
AST SERPL W P-5'-P-CCNC: 18 U/L (ref 0–45)
BILIRUB SERPL-MCNC: 0.5 MG/DL (ref 0.2–1.3)
BUN SERPL-MCNC: 25 MG/DL (ref 7–30)
CALCIUM SERPL-MCNC: 9.2 MG/DL (ref 8.5–10.1)
CHLORIDE SERPL-SCNC: 109 MMOL/L (ref 94–109)
CHOLEST SERPL-MCNC: 164 MG/DL
CO2 SERPL-SCNC: 28 MMOL/L (ref 20–32)
CREAT SERPL-MCNC: 1.36 MG/DL (ref 0.66–1.25)
GFR SERPL CREATININE-BSD FRML MDRD: 52 ML/MIN/1.7M2
GLUCOSE SERPL-MCNC: 98 MG/DL (ref 70–99)
HBA1C MFR BLD: 5.4 % (ref 4.3–6)
HDLC SERPL-MCNC: 50 MG/DL
LDLC SERPL CALC-MCNC: 72 MG/DL
NONHDLC SERPL-MCNC: 114 MG/DL
POTASSIUM SERPL-SCNC: 4.3 MMOL/L (ref 3.4–5.3)
PROT SERPL-MCNC: 6.8 G/DL (ref 6.8–8.8)
SODIUM SERPL-SCNC: 142 MMOL/L (ref 133–144)
TRIGL SERPL-MCNC: 212 MG/DL

## 2018-03-26 PROCEDURE — 80061 LIPID PANEL: CPT | Performed by: FAMILY MEDICINE

## 2018-03-26 PROCEDURE — G0009 ADMIN PNEUMOCOCCAL VACCINE: HCPCS | Performed by: FAMILY MEDICINE

## 2018-03-26 PROCEDURE — 99214 OFFICE O/P EST MOD 30 MIN: CPT | Mod: 25 | Performed by: FAMILY MEDICINE

## 2018-03-26 PROCEDURE — 90732 PPSV23 VACC 2 YRS+ SUBQ/IM: CPT | Performed by: FAMILY MEDICINE

## 2018-03-26 PROCEDURE — 80053 COMPREHEN METABOLIC PANEL: CPT | Performed by: FAMILY MEDICINE

## 2018-03-26 PROCEDURE — 36415 COLL VENOUS BLD VENIPUNCTURE: CPT | Performed by: FAMILY MEDICINE

## 2018-03-26 PROCEDURE — 83036 HEMOGLOBIN GLYCOSYLATED A1C: CPT | Performed by: FAMILY MEDICINE

## 2018-03-26 ASSESSMENT — PAIN SCALES - GENERAL: PAINLEVEL: NO PAIN (0)

## 2018-03-26 NOTE — NURSING NOTE
Chief Complaint   Patient presents with     Recheck Medication     Panel Management     microalbumin, advance directive , LDL      Prior to injection verified patient identity using patient's name and date of birth.      Screening Questionnaire for Adult Immunization    Are you sick today?   No   Do you have allergies to medications, food, a vaccine component or latex?   Yes   Have you ever had a serious reaction after receiving a vaccination?   No   Do you have a long-term health problem with heart disease, lung disease, asthma, kidney disease, metabolic disease (e.g. diabetes), anemia, or other blood disorder?   No   Do you have cancer, leukemia, HIV/AIDS, or any other immune system problem?   No   In the past 3 months, have you taken medications that affect  your immune system, such as prednisone, other steroids, or anticancer drugs; drugs for the treatment of rheumatoid arthritis, Crohn s disease, or psoriasis; or have you had radiation treatments?   No   Have you had a seizure, or a brain or other nervous system problem?   No   During the past year, have you received a transfusion of blood or blood     products, or been given immune (gamma) globulin or antiviral drug?   No   For women: Are you pregnant or is there a chance you could become        pregnant during the next month?   No   Have you received any vaccinations in the past 4 weeks?   No     Immunization questionnaire was positive for at least one answer.     Per orders of Dr. Hercules, injection of pneumococcal 23 given by Golden Knight. Patient instructed to remain in clinic for 15 minutes afterwards, and to report any adverse reaction to me immediately.       Screening performed by [facility] Knight on 3/26/2018 at 9:10 AM.

## 2018-03-26 NOTE — MR AVS SNAPSHOT
After Visit Summary   3/26/2018    Driss Pope    MRN: [id number]           Patient Information     Date Of Birth          1950        Visit Information        Provider Department      3/26/2018 8:20 AM Vinod Hercules MD [facility]        Today's Diagnoses     Hypertension, goal below 140/90    -  1    CKD (chronic kidney disease) stage 3, GFR 30-59 ml/min        Hyperlipidemia LDL goal <100        Morbid obesity (H)        Need for 23-polyvalent pneumococcal polysaccharide vaccine        Advanced directives, counseling/discussion        Benign non-nodular prostatic hyperplasia with lower urinary tract symptoms        Hyperglycemia          Care Instructions    These are new changes to your current plan of care based on today's visit:    Medications stopped    Medications to be started    Change dose of this medication None   New treatments        Follow up appointments:    1.  FOLLOW UP WITH YOUR PRIMARY CARE PROVIDER: 6 month(s) for six months with fasting blood work    2. FOLLOW UP WITH SPECIALIST: As planned    Update your shingle vaccine at your pharmacy.    Vinod Hercules MD                    Follow-ups after your visit        Follow-up notes from your care team     Return in about 6 months (around 9/26/2018) for Lab Work, Routine Visit.      Your next 10 appointments already scheduled     Oct 09, 2018  9:00 AM CDT   LAB with LAB FIRST FLOOR [facility] ([facility])    [address] [place] [id number] [phone]           Please do not eat 10-12 hours before your appointment if you are coming in fasting for labs on lipids, cholesterol, or glucose (sugar). This does not apply to pregnant women. Water, hot tea and black coffee (with nothing added) are okay. Do not drink other fluids, diet soda or chew gum.            Oct 09, 2018  9:30 AM CDT   Return Visit with Rosalie Fitzgerald MD   [facility]  "[facility] ([facility])    [address] [place] [address] [phone]              Who to contact     If you have questions or need follow up information about today's clinic visit or your schedule please contact [facility] directly at [phone].  Normal or non-critical lab and imaging results will be communicated to you by MyChart, letter or phone within 4 business days after the clinic has received the results. If you do not hear from us within 7 days, please contact the clinic through ePrivateHirehart or phone. If you have a critical or abnormal lab result, we will notify you by phone as soon as possible.  Submit refill requests through Glyde or call your pharmacy and they will forward the refill request to us. Please allow 3 business days for your refill to be completed.          Additional Information About Your Visit        MyChart Information     Glyde gives you secure access to your electronic health record. If you see a primary care provider, you can also send messages to your care team and make appointments. If you have questions, please call your primary care clinic.  If you do not have a primary care provider, please call [phone] and they will assist you.        Care EveryWhere ID     This is your Care EveryWhere ID. This could be used by other organizations to access your [facility] medical records  [id number]        Your Vitals Were     Pulse Temperature Respirations Height BMI (Body Mass Index)       70 97.5  F (36.4  C) (Temporal) 14 5' 8\" (1.727 m) 36.34 kg/m2        Blood Pressure from Last 3 Encounters:   03/26/18 130/80   12/22/17 136/80   12/05/17 140/78    Weight from Last 3 Encounters:   03/26/18 239 lb (108.4 kg)   12/22/17 238 lb 6.4 oz (108.1 kg)   12/05/17 240 lb (108.9 kg)              We Performed the Following     ADMIN: Vaccine, Initial (43398)     Comprehensive metabolic panel     Hemoglobin A1c     Lipid panel reflex to direct LDL " Fasting     Pneumococcal vaccine 23 valent PPSV23  (Pneumovax) [03639]        Primary Care Provider Office Phone # Fax #    Vinod Hercules [phone] [address] [facility] [address]        Equal Access to Services     [facility] : Hadii aad ku hadasho Soomaali, waaxda luqadaha, qaybta kaalmada adeegyada, waxay starin haytomn rukhsana yaelshanae dexter . So [facility] [phone].    ATENCIÓN: Si habla español, tiene a fitzgerald disposición servicios gratuitos de asistencia lingüística. Lionel al [phone].    We comply with applicable federal civil rights laws and [place] laws. We do not discriminate on the basis of race, color, national origin, age, disability, sex, sexual orientation, or gender identity.            Thank you!     Thank you for choosing [facility]  for your care. Our goal is always to provide you with excellent care. Hearing back from our patients is one way we can continue to improve our services. Please take a few minutes to complete the written survey that you may receive in the mail after your visit with us. Thank you!             Your Updated Medication List - Protect others around you: Learn how to safely use, store and throw away your medicines at www.disposemymeds.org.          This list is accurate as of 3/26/18  8:59 AM.  Always use your most recent med list.                   Brand Name Dispense Instructions for use Diagnosis    aspirin 325 MG EC tablet     100 tablet    Take 1 tablet by mouth daily.    CKD (chronic kidney disease) stage 2, GFR 60-89 ml/min, Hypertension goal BP (blood pressure) < 130/80       atorvastatin 20 MG tablet    LIPITOR    90 tablet    TAKE 1 TABLET EVERY DAY (LABS AND FASTING NEEDED)    Hyperlipidemia LDL goal <100, CKD (chronic kidney disease) stage 3, GFR 30-59 ml/min       CIALIS 20 MG tablet   Generic drug:  tadalafil     9 tablet    Take 1/2 to 1 tablet by  mouth daily as needed,  never use with  nitroglycerin,  terazosin,  or doxazosin    ED (erectile dysfunction)       GLUCOSAMINE CHONDROITIN ADV PO      2-3 tablets daily.        Krill Oil 1000 MG Caps      Take by mouth daily        losartan 50 MG tablet    COZAAR    90 tablet    TAKE 1 TABLET EVERY DAY    Hypertension, goal below 140/90, CKD (chronic kidney disease) stage 3, GFR 30-59 ml/min       MULTIVITAL PO      Once daily.        tamsulosin 0.4 MG capsule    FLOMAX    180 capsule    TAKE 2 CAPSULES EVERY DAY    Benign non-nodular prostatic hyperplasia with lower urinary tract symptoms

## 2018-03-26 NOTE — PATIENT INSTRUCTIONS
These are new changes to your current plan of care based on today's visit:    Medications stopped    Medications to be started    Change dose of this medication None   New treatments        Follow up appointments:    1.  FOLLOW UP WITH YOUR PRIMARY CARE PROVIDER: 6 month(s) for six months with fasting blood work    2. FOLLOW UP WITH SPECIALIST: As planned    Update your shingle vaccine at your pharmacy.    Vinod Hercules MD

## 2018-06-16 DIAGNOSIS — N18.30 CKD (CHRONIC KIDNEY DISEASE) STAGE 3, GFR 30-59 ML/MIN (H): ICD-10-CM

## 2018-06-16 DIAGNOSIS — E78.5 HYPERLIPIDEMIA LDL GOAL <100: ICD-10-CM

## 2018-06-18 RX ORDER — ATORVASTATIN CALCIUM 20 MG/1
20 TABLET, FILM COATED ORAL DAILY
Qty: 90 TABLET | Refills: 1 | Status: SHIPPED | OUTPATIENT
Start: 2018-06-18 | End: 2018-12-14

## 2018-06-18 NOTE — TELEPHONE ENCOUNTER
"Requested Prescriptions   Pending Prescriptions Disp Refills     atorvastatin (LIPITOR) 20 MG tablet [Pharmacy Med Name: ATORVASTATIN CALCIUM 20 MG Tablet] 90 tablet 0     Sig: TAKE 1 TABLET EVERY DAY  (LABS  AND  FASTING  NEEDED)    Statins Protocol Passed    6/16/2018 11:07 AM       Passed - LDL on file in past 12 months    Recent Labs   Lab Test  03/26/18   0906   LDL  72          Passed - No abnormal creatine kinase in past 12 months    Recent Labs   Lab Test  06/27/12   0843   CKT  187           Passed - Recent (12 mo) or future (30 days) visit within the authorizing provider's specialty    Patient had office visit in the last 12 months or has a visit in the next 30 days with authorizing provider or within the authorizing provider's specialty.  See \"Patient Info\" tab in inbasket, or \"Choose Columns\" in Meds & Orders section of the refill encounter.           Passed - Patient is age 18 or older        atorvastatin (LIPITOR) 20 MG tablet  Prescription approved per [facility] Refill Protocol.    Blanquita Gates RN, BSN     "

## 2018-07-23 ENCOUNTER — TRANSFERRED RECORDS (OUTPATIENT)
Dept: HEALTH INFORMATION MANAGEMENT | Facility: CLINIC | Age: 68
End: 2018-07-23

## 2018-09-04 ENCOUNTER — OFFICE VISIT (OUTPATIENT)
Dept: FAMILY MEDICINE | Facility: CLINIC | Age: 68
End: 2018-09-04
Payer: COMMERCIAL

## 2018-09-04 ENCOUNTER — RADIANT APPOINTMENT (OUTPATIENT)
Dept: GENERAL RADIOLOGY | Facility: CLINIC | Age: 68
End: 2018-09-04
Attending: FAMILY MEDICINE
Payer: COMMERCIAL

## 2018-09-04 VITALS
BODY MASS INDEX: 36.9 KG/M2 | TEMPERATURE: 97.9 F | OXYGEN SATURATION: 96 % | SYSTOLIC BLOOD PRESSURE: 108 MMHG | HEART RATE: 60 BPM | DIASTOLIC BLOOD PRESSURE: 78 MMHG | RESPIRATION RATE: 16 BRPM | HEIGHT: 68 IN | WEIGHT: 243.5 LBS

## 2018-09-04 DIAGNOSIS — L03.114 CELLULITIS OF LEFT UPPER EXTREMITY: ICD-10-CM

## 2018-09-04 DIAGNOSIS — I10 HYPERTENSION, GOAL BELOW 140/90: ICD-10-CM

## 2018-09-04 DIAGNOSIS — M70.22 OLECRANON BURSITIS OF LEFT ELBOW: ICD-10-CM

## 2018-09-04 DIAGNOSIS — N18.30 CKD (CHRONIC KIDNEY DISEASE) STAGE 3, GFR 30-59 ML/MIN (H): ICD-10-CM

## 2018-09-04 DIAGNOSIS — M70.22 OLECRANON BURSITIS OF LEFT ELBOW: Primary | ICD-10-CM

## 2018-09-04 LAB
BASOPHILS # BLD AUTO: 0 10E9/L (ref 0–0.2)
BASOPHILS NFR BLD AUTO: 0.3 %
DIFFERENTIAL METHOD BLD: NORMAL
EOSINOPHIL # BLD AUTO: 0.1 10E9/L (ref 0–0.7)
EOSINOPHIL NFR BLD AUTO: 1.5 %
ERYTHROCYTE [DISTWIDTH] IN BLOOD BY AUTOMATED COUNT: 13.6 % (ref 10–15)
ERYTHROCYTE [SEDIMENTATION RATE] IN BLOOD BY WESTERGREN METHOD: 9 MM/H (ref 0–20)
HCT VFR BLD AUTO: 42.2 % (ref 40–53)
HGB BLD-MCNC: 13.8 G/DL (ref 13.3–17.7)
LYMPHOCYTES # BLD AUTO: 1.3 10E9/L (ref 0.8–5.3)
LYMPHOCYTES NFR BLD AUTO: 20.2 %
MCH RBC QN AUTO: 28.8 PG (ref 26.5–33)
MCHC RBC AUTO-ENTMCNC: 32.7 G/DL (ref 31.5–36.5)
MCV RBC AUTO: 88 FL (ref 78–100)
MONOCYTES # BLD AUTO: 0.5 10E9/L (ref 0–1.3)
MONOCYTES NFR BLD AUTO: 7.1 %
NEUTROPHILS # BLD AUTO: 4.6 10E9/L (ref 1.6–8.3)
NEUTROPHILS NFR BLD AUTO: 70.9 %
PLATELET # BLD AUTO: 202 10E9/L (ref 150–450)
RBC # BLD AUTO: 4.79 10E12/L (ref 4.4–5.9)
WBC # BLD AUTO: 6.5 10E9/L (ref 4–11)

## 2018-09-04 PROCEDURE — 85025 COMPLETE CBC W/AUTO DIFF WBC: CPT | Performed by: FAMILY MEDICINE

## 2018-09-04 PROCEDURE — 36415 COLL VENOUS BLD VENIPUNCTURE: CPT | Performed by: FAMILY MEDICINE

## 2018-09-04 PROCEDURE — 85652 RBC SED RATE AUTOMATED: CPT | Performed by: FAMILY MEDICINE

## 2018-09-04 PROCEDURE — 73070 X-RAY EXAM OF ELBOW: CPT | Mod: LT

## 2018-09-04 PROCEDURE — 99213 OFFICE O/P EST LOW 20 MIN: CPT | Performed by: FAMILY MEDICINE

## 2018-09-04 RX ORDER — CLINDAMYCIN HCL 300 MG
300 CAPSULE ORAL 4 TIMES DAILY
Qty: 30 CAPSULE | Refills: 0 | Status: SHIPPED | OUTPATIENT
Start: 2018-09-04 | End: 2019-03-29

## 2018-09-04 ASSESSMENT — PAIN SCALES - GENERAL: PAINLEVEL: NO PAIN (0)

## 2018-09-04 NOTE — PROGRESS NOTES
SUBJECTIVE:   Driss Pope is a 68 year old male who presents to clinic today for the following health issues:      HPI  Joint Pain--    This patient presents with a relatively asymptomatic swelling of the left olecranon bursa.  He had mild to moderate blunt trauma to the left olecranon 5 weeks ago.  He was getting off a carnival ride and sustained blunt trauma to the olecranon.  He has had some slow development of swelling and enlargement of the olecranon bursa.    Previous to that he had a large abrasion on the volar left forearm which she stated was essentially resolved by the time the olecranon trauma occurred.  He responded to 10 days of Keflex 500 mg 3 times daily.  Since the bursal swelling has occurred, states there was one episode of possibly slightly purulent material followed by some serous looking material.  No bleeding.  He has had some mild skin irritation over the olecranon area.  Had no fever or chills.  No limitation of left elbow joint function    He has some current redness around the olecranon bursa which is not certain whether or not it is increasing or decreasing.      Onset: July 30    Description:   Location: left elbow  Character: tender to the touch only painful when touching. Had previous scab on the elbow before it was injured    Intensity: moderate    Progression of Symptoms: same    Accompanying Signs & Symptoms:  Other symptoms: tingling, warmth, swelling and redness    History:   Previous similar pain: no       Precipitating factors:   Trauma or overuse: YES    Alleviating factors:  Improved by: rest/inactivity    Therapies Tried and outcome: None      Problem list and histories reviewed & adjusted, as indicated.  Additional history: as documented        BP Readings from Last 3 Encounters:   09/04/18 108/78   03/26/18 130/80   12/22/17 136/80    Wt Readings from Last 3 Encounters:   09/04/18 243 lb 8 oz (110.5 kg)   03/26/18 239 lb (108.4 kg)   12/22/17 238 lb 6.4 oz (108.1 kg)  "                   ROS:   ROS: 10 point ROS neg other than the symptoms noted above in the HPI.      OBJECTIVE:                                                    /78  Pulse 60  Temp 97.9  F (36.6  C) (Temporal)  Resp 16  Ht 5' 8\" (1.727 m)  Wt 243 lb 8 oz (110.5 kg)  SpO2 96%  BMI 37.02 kg/m2  Body mass index is 37.02 kg/(m^2).  GENERAL: healthy, alert and no distress  HENT: ear canals- normal; TMs- normal; Nose- normal; Mouth- no ulcers, no lesions  NECK: no tenderness, no adenopathy, no asymmetry, no masses, no stiffness; thyroid- normal to palpation  RESP: lungs clear to auscultation - no rales, no rhonchi, no wheezes  CV: regular rates and rhythm, normal S1 S2, no S3 or S4 and no murmur, no click or rub -  ABDOMEN: soft, no tenderness, no  hepatosplenomegaly, no masses, normal bowel sounds  MS: Moderate sized left olecranon bursal swelling and prominence.  There is some mild break in the skin without a drainage site over the olecranon.  There is a good deal of significant redness to slight increased warmth involving the skin overlying the olecranon bursa.  Has full range of motion of his left elbow.  CMS is otherwise intact.  No tenderness over the bursal area.  SKIN: Has what appears to be significant erythema overlying the left olecranon bursa.  NEURO: strength and tone- normal, sensory exam- grossly normal, mentation- intact, speech- normal, reflexes- symmetric  PSYCH: Alert and oriented times 3; speech- coherent , normal rate and volume; able to articulate logical thoughts, able to abstract reason, no tangential thoughts, no hallucinations or delusions, affect- normal    Diagnostic test results:  Diagnostic Test Results:  Results for orders placed or performed in visit on 09/04/18 (from the past 24 hour(s))   CBC with platelets differential   Result Value Ref Range    WBC 6.5 4.0 - 11.0 10e9/L    RBC Count 4.79 4.4 - 5.9 10e12/L    Hemoglobin 13.8 13.3 - 17.7 g/dL    Hematocrit 42.2 40.0 - 53.0 " %    MCV 88 78 - 100 fl    MCH 28.8 26.5 - 33.0 pg    MCHC 32.7 31.5 - 36.5 g/dL    RDW 13.6 10.0 - 15.0 %    Platelet Count 202 150 - 450 10e9/L    Diff Method Automated Method     % Neutrophils 70.9 %    % Lymphocytes 20.2 %    % Monocytes 7.1 %    % Eosinophils 1.5 %    % Basophils 0.3 %    Absolute Neutrophil 4.6 1.6 - 8.3 10e9/L    Absolute Lymphocytes 1.3 0.8 - 5.3 10e9/L    Absolute Monocytes 0.5 0.0 - 1.3 10e9/L    Absolute Eosinophils 0.1 0.0 - 0.7 10e9/L    Absolute Basophils 0.0 0.0 - 0.2 10e9/L   ESR: Erythrocyte sedimentation rate   Result Value Ref Range    Sed Rate 9 0 - 20 mm/h        Xr Elbow Left 2 Views    Result Date: 9/4/2018  LEFT ELBOW TWO VIEWS  9/4/2018 12:27 PM HISTORY:  Five weeks post blunt trauma to the left olecranon. Olecranon bursitis of left elbow. Cellulitis of left upper extremity. COMPARISON: None. FINDINGS:   No acute fracture or dislocation. Alignment is anatomic. There are calcific densities projected over the radial aspect of the radiocapitellar joint, likely representing chondrocalcinosis or small ossified intra-articular free bodies. No anterior or posterior fat pad elevation to suggest occult fracture. There is a large amount of soft tissue swelling along the olecranon prominence, likely representing olecranon bursitis. Some edema is seen in the subcutaneous adipose tissues along the ulnar aspect of the elbow.     IMPRESSION: 1. Olecranon bursitis. 2. Edema in the subcutaneous adipose tissues along the ulnar aspect of the elbow could represent contusion or cellulitis. 3. Chondrocalcinosis or ossified intra-articular free bodies along the radiocapitellar aspect of the elbow joint. 4. No fracture is seen.    ASSESSMENT/PLAN:                                                    1. Olecranon bursitis of left elbow  Posttraumatic as noted.  - XR Elbow Left 2 Views; Future  - clindamycin (CLEOCIN) 300 MG capsule; Take 1 capsule (300 mg) by mouth 4 times daily  Dispense: 30 capsule;  Refill: 0    2. Cellulitis of left upper extremity  Treating for superficial cellulitis of the area overlying the olecranon bursa.  Do not feel that there is infection in the bursal's fluid or intra-articular structures.  Still he was on Keflex will switch to Cleocin.  - CBC with platelets differential  - ESR: Erythrocyte sedimentation rate  - XR Elbow Left 2 Views; Future  - clindamycin (CLEOCIN) 300 MG capsule; Take 1 capsule (300 mg) by mouth 4 times daily  Dispense: 30 capsule; Refill: 0      Follow up with Provider -He will follow-up with Dr. Martino in 2 days for recommendations and evaluation of the persistent bursal fluid.  Likely this will be left for reabsorption spontaneously without drainage.  Will reevaluate the apparent cellulitis in 2 days.  See Patient Instructions    The patient understood the rational for the diagnosis and treatment plan. All questions were answered to best of my ability and the patient's satisfaction.    Note: Chart documentation done in part with Dragon Voice Recognition software. Although reviewed after completion, some word and grammatical errors may remain.  Please consider this when interpreting information in this chart.    Vinod Hercules MD  [facility]

## 2018-09-04 NOTE — MR AVS SNAPSHOT
After Visit Summary   9/4/2018    Driss Pope    MRN: [id number]           Patient Information     Date Of Birth          1950        Visit Information        Provider Department      9/4/2018 11:40 AM Vinod Hercules MD [facility]        Today's Diagnoses     Olecranon bursitis of left elbow    -  1    Cellulitis of left upper extremity           Follow-ups after your visit        Follow-up notes from your care team     Return in about 2 days (around 9/6/2018) for Follow up with Dr. Martino.      Your next 10 appointments already scheduled     Sep 06, 2018  8:45 AM CDT   New Visit with Viraj Martino MD   [facility]    [address] [address] [facility]  [address]  [place] [id number] [phone]            Oct 09, 2018  9:00 AM CDT   LAB with LAB FIRST FLOOR [facility] ([facility])    [address] [place] [id number] [phone]           Please do not eat 10-12 hours before your appointment if you are coming in fasting for labs on lipids, cholesterol, or glucose (sugar). This does not apply to pregnant women. Water, hot tea and black coffee (with nothing added) are okay. Do not drink other fluids, diet soda or chew gum.            Oct 09, 2018  9:30 AM CDT   Return Visit with Rosalie Fitzgerald MD   [facility] ([facility])    [address] [place] [id number] [phone]              Who to contact     If you have questions or need follow up information about today's clinic visit or your schedule please contact [facility] directly at [phone].  Normal or non-critical lab and imaging results will be communicated to you by MyChart, letter or phone within 4 business days after the clinic has received the results. If you do not hear from us within 7 days, please contact the clinic through MyChart or phone.  "If you have a critical or abnormal lab result, we will notify you by phone as soon as possible.  Submit refill requests through Canary Calendar or call your pharmacy and they will forward the refill request to us. Please allow 3 business days for your refill to be completed.          Additional Information About Your Visit        PassbeeMediahart Information     Canary Calendar gives you secure access to your electronic health record. If you see a primary care provider, you can also send messages to your care team and make appointments. If you have questions, please call your primary care clinic.  If you do not have a primary care provider, please call [phone] and they will assist you.        Care EveryWhere ID     This is your Care EveryWhere ID. This could be used by other organizations to access your [facility] medical records  [id number]        Your Vitals Were     Pulse Temperature Respirations Height Pulse Oximetry BMI (Body Mass Index)    60 97.9  F (36.6  C) (Temporal) 16 5' 8\" (1.727 m) 96% 37.02 kg/m2       Blood Pressure from Last 3 Encounters:   09/04/18 108/78   03/26/18 130/80   12/22/17 136/80    Weight from Last 3 Encounters:   09/04/18 243 lb 8 oz (110.5 kg)   03/26/18 239 lb (108.4 kg)   12/22/17 238 lb 6.4 oz (108.1 kg)              We Performed the Following     CBC with platelets differential     ESR: Erythrocyte sedimentation rate          Today's Medication Changes          These changes are accurate as of 9/4/18 12:39 PM.  If you have any questions, ask your nurse or doctor.               Start taking these medicines.        Dose/Directions    clindamycin 300 MG capsule   Commonly known as:  CLEOCIN   Used for:  Cellulitis of left upper extremity, Olecranon bursitis of left elbow   Started by:  Vinod Hercules MD        Dose:  300 mg   Take 1 capsule (300 mg) by mouth 4 times daily   Quantity:  30 capsule   Refills:  0            Where to get your medicines      These medications were sent to [facility] [address] [facility] [address] [address]  [address] [address], [facility] [address]     Phone:  [phone]     clindamycin 300 MG capsule                Primary Care Provider Office Phone # Fax #    Vinod Hercules [phone] [address] [facility] [place] [address]        Equal Access to Services     SHEA KIRAN : Hadii aad ku hadasho Soomaali, waaxda luqadaha, qaybta kaalmada adeegyada, waxay idiin hayaan adeeg kharash la'aan ah. So [facility] [phone].    ATENCIÓN: Si habla espavel, tiene a fitzgerald disposición servicios gratuitos de asistencia lingüística. Llame al [phone].    We comply with applicable federal civil rights laws and [place] laws. We do not discriminate on the basis of race, color, national origin, age, disability, sex, sexual orientation, or gender identity.            Thank you!     Thank you for choosing [facility]  for your care. Our goal is always to provide you with excellent care. Hearing back from our patients is one way we can continue to improve our services. Please take a few minutes to complete the written survey that you may receive in the mail after your visit with us. Thank you!             Your Updated Medication List - Protect others around you: Learn how to safely use, store and throw away your medicines at www.disposemymeds.org.          This list is accurate as of 9/4/18 12:39 PM.  Always use your most recent med list.                   Brand Name Dispense Instructions for use Diagnosis    aspirin 325 MG EC tablet     100 tablet    Take 1 tablet by mouth daily.    CKD (chronic kidney disease) stage 2, GFR 60-89 ml/min, Hypertension goal BP (blood pressure) < 130/80       atorvastatin 20 MG tablet    LIPITOR    90 tablet    Take 1 tablet (20 mg) by mouth daily    Hyperlipidemia LDL goal <100, CKD (chronic kidney disease) stage 3, GFR 30-59 ml/min       CIALIS 20 MG tablet   Generic drug:  tadalafil     9 tablet     Take 1/2 to 1 tablet by  mouth daily as needed,  never use with  nitroglycerin, terazosin,  or doxazosin    ED (erectile dysfunction)       clindamycin 300 MG capsule    CLEOCIN    30 capsule    Take 1 capsule (300 mg) by mouth 4 times daily    Cellulitis of left upper extremity, Olecranon bursitis of left elbow       GLUCOSAMINE CHONDROITIN ADV PO      2-3 tablets daily.        Krill Oil 1000 MG Caps      Take by mouth daily        losartan 50 MG tablet    COZAAR    90 tablet    TAKE 1 TABLET EVERY DAY    Hypertension, goal below 140/90, CKD (chronic kidney disease) stage 3, GFR 30-59 ml/min       MULTIVITAL PO      Once daily.        tamsulosin 0.4 MG capsule    FLOMAX    180 capsule    TAKE 2 CAPSULES EVERY DAY    Benign non-nodular prostatic hyperplasia with lower urinary tract symptoms

## 2018-09-05 RX ORDER — LOSARTAN POTASSIUM 50 MG/1
TABLET ORAL
Qty: 90 TABLET | Refills: 1 | Status: SHIPPED | OUTPATIENT
Start: 2018-09-05 | End: 2018-10-09

## 2018-09-05 NOTE — TELEPHONE ENCOUNTER
Cozaar  Routing refill request to provider for review/approval because:  Labs not current:  Creatinine    Next 5 appointments (look out 90 days)     Oct 09, 2018  9:30 AM CDT   Return Visit with Rosalie Fitzgerald MD   [facility] ([facility])    [address] [place] [id number]   [phone]                Vianney Geiger RN, BSN

## 2018-09-06 ENCOUNTER — OFFICE VISIT (OUTPATIENT)
Dept: ORTHOPEDICS | Facility: CLINIC | Age: 68
End: 2018-09-06
Payer: COMMERCIAL

## 2018-09-06 VITALS
BODY MASS INDEX: 36.37 KG/M2 | HEIGHT: 68 IN | RESPIRATION RATE: 12 BRPM | SYSTOLIC BLOOD PRESSURE: 169 MMHG | DIASTOLIC BLOOD PRESSURE: 102 MMHG | WEIGHT: 240 LBS

## 2018-09-06 DIAGNOSIS — M70.22 OLECRANON BURSITIS, LEFT ELBOW: Primary | ICD-10-CM

## 2018-09-06 LAB
GRAM STN SPEC: NORMAL
SPECIMEN SOURCE: NORMAL

## 2018-09-06 PROCEDURE — 87205 SMEAR GRAM STAIN: CPT | Performed by: ORTHOPAEDIC SURGERY

## 2018-09-06 PROCEDURE — 87070 CULTURE OTHR SPECIMN AEROBIC: CPT | Performed by: ORTHOPAEDIC SURGERY

## 2018-09-06 PROCEDURE — 20605 DRAIN/INJ JOINT/BURSA W/O US: CPT | Mod: LT | Performed by: ORTHOPAEDIC SURGERY

## 2018-09-06 PROCEDURE — 99203 OFFICE O/P NEW LOW 30 MIN: CPT | Mod: 25 | Performed by: ORTHOPAEDIC SURGERY

## 2018-09-06 PROCEDURE — 87015 SPECIMEN INFECT AGNT CONCNTJ: CPT | Performed by: ORTHOPAEDIC SURGERY

## 2018-09-06 NOTE — MR AVS SNAPSHOT
After Visit Summary   9/6/2018    Driss Pope    MRN: [id number]           Patient Information     Date Of Birth          1950        Visit Information        Provider Department      9/6/2018 8:45 AM Viraj Martino MD [facility]        Today's Diagnoses     Olecranon bursitis, left elbow    -  1       Follow-ups after your visit        Your next 10 appointments already scheduled     Oct 09, 2018  9:00 AM CDT   LAB with LAB FIRST FLOOR [facility])    [address] [place] [id number] [phone]           Please do not eat 10-12 hours before your appointment if you are coming in fasting for labs on lipids, cholesterol, or glucose (sugar). This does not apply to pregnant women. Water, hot tea and black coffee (with nothing added) are okay. Do not drink other fluids, diet soda or chew gum.            Oct 09, 2018  9:30 AM CDT   Return Visit with Rosalie Fitzgerald MD   [facility])    [address] [place] [id number] [phone]              Who to contact     If you have questions or need follow up information about today's clinic visit or your schedule please contact [facility] directly at [phone].  Normal or non-critical lab and imaging results will be communicated to you by MyChart, letter or phone within 4 business days after the clinic has received the results. If you do not hear from us within 7 days, please contact the clinic through MyChart or phone. If you have a critical or abnormal lab result, we will notify you by phone as soon as possible.  Submit refill requests through [employer] or call your pharmacy and they will forward the refill request to us. Please allow 3 business days for your refill to be completed.          Additional Information About Your Visit        [employer] Information     [employer] gives you  "secure access to your electronic health record. If you see a primary care provider, you can also send messages to your care team and make appointments. If you have questions, please call your primary care clinic.  If you do not have a primary care provider, please call [phone] and they will assist you.        Care EveryWhere ID     This is your Care EveryWhere ID. This could be used by other organizations to access your [place] medical records  [id number]        Your Vitals Were     Respirations Height BMI (Body Mass Index)             12 1.727 m (5' 8\") 36.49 kg/m2          Blood Pressure from Last 3 Encounters:   09/06/18 (!) 169/102   09/04/18 108/78   03/26/18 130/80    Weight from Last 3 Encounters:   09/06/18 108.9 kg (240 lb)   09/04/18 110.5 kg (243 lb 8 oz)   03/26/18 108.4 kg (239 lb)              We Performed the Following     DRAIN/INJECT MEDIUM JOINT/BURSA     Fluid Culture Aerobic Bacterial     Gram stain        Primary Care Provider Office Phone # Fax #    Vinod Hercules [phone] [address], [place] [address]        Equal Access to Services     YESSICA KIRAN : Hadii pernell hunter hadasho Soniyahali, waaxda luqadaha, qaybta kaalmada adeegyada, karlene ashby. So [facility] [phone].    ATENCIÓN: Si habla español, tiene a fitzgerald disposición servicios gratuitos de asistencia lingüística. Llame al [phone].    We comply with applicable federal civil rights laws and [place] laws. We do not discriminate on the basis of race, color, national origin, age, disability, sex, sexual orientation, or gender identity.            Thank you!     Thank you for choosing [facility]  for your care. Our goal is always to provide you with excellent care. Hearing back from our patients is one way we can continue to improve our services. Please take a few minutes to complete the written survey that you may receive in the mail after your visit with " us. Thank you!             Your Updated Medication List - Protect others around you: Learn how to safely use, store and throw away your medicines at www.disposemymeds.org.          This list is accurate as of 9/6/18 11:59 PM.  Always use your most recent med list.                   Brand Name Dispense Instructions for use Diagnosis    aspirin 325 MG EC tablet     100 tablet    Take 1 tablet by mouth daily.    CKD (chronic kidney disease) stage 2, GFR 60-89 ml/min, Hypertension goal BP (blood pressure) < 130/80       atorvastatin 20 MG tablet    LIPITOR    90 tablet    Take 1 tablet (20 mg) by mouth daily    Hyperlipidemia LDL goal <100, CKD (chronic kidney disease) stage 3, GFR 30-59 ml/min       CIALIS 20 MG tablet   Generic drug:  tadalafil     9 tablet    Take 1/2 to 1 tablet by  mouth daily as needed,  never use with  nitroglycerin, terazosin,  or doxazosin    ED (erectile dysfunction)       clindamycin 300 MG capsule    CLEOCIN    30 capsule    Take 1 capsule (300 mg) by mouth 4 times daily    Cellulitis of left upper extremity, Olecranon bursitis of left elbow       GLUCOSAMINE CHONDROITIN ADV PO      2-3 tablets daily.        Krill Oil 1000 MG Caps      Take by mouth daily        losartan 50 MG tablet    COZAAR    90 tablet    TAKE 1 TABLET EVERY DAY    Hypertension, goal below 140/90, CKD (chronic kidney disease) stage 3, GFR 30-59 ml/min       MULTIVITAL PO      Once daily.        tamsulosin 0.4 MG capsule    FLOMAX    180 capsule    TAKE 2 CAPSULES EVERY DAY    Benign non-nodular prostatic hyperplasia with lower urinary tract symptoms

## 2018-09-06 NOTE — LETTER
9/6/2018         RE: Driss Pope  [address]  [place] [address]        Dear Colleague,    Thank you for referring your patient, Driss Pope, to the [facility]. Please see a copy of my visit note below.    Driss Pope is a 68 year old male who is seen in consultation at the request of Dr. Vinod Hercules  for left olecranon bursitis.    Past Medical History:   Diagnosis Date     CKD (chronic kidney disease)      Hyperlipidemia LDL goal < 100      Hypertension goal BP (blood pressure) < 130/80      Hypertriglyceridemia      Obesity      Osteoarthritis        Past Surgical History:   Procedure Laterality Date     ARTHROSCOPY KNEE RT/LT      Left knee     COLONOSCOPY  10/4/2011    Procedure:COLONOSCOPY; Colonoscopy, screening; Surgeon:HANNAH COMER; Location:[facility]     COLONOSCOPY  10/4/2011    Procedure:COMBINED COLONOSCOPY, REMOVE TUMOR/POLYP/LESION BY SNARE; Surgeon:HANNAH COMER; [facility]     COLONOSCOPY N/A 12/9/2016    Procedure: COMBINED COLONOSCOPY, SINGLE OR MULTIPLE BIOPSY/POLYPECTOMY BY BIOPSY;  Surgeon: Duane, William Charles, MD;  Location: [facility]     COLONOSCOPY WITH CO2 INSUFFLATION N/A 12/9/2016    Procedure: COLONOSCOPY WITH CO2 INSUFFLATION;  Surgeon: Duane, William Charles, MD;  Location: [facility]     COLONOSCOPY WITH CO2 INSUFFLATION N/A 3/15/2017    Procedure: COLONOSCOPY WITH CO2 INSUFFLATION;  Surgeon: Duane, William Charles, MD;  Location: [facility] OR     JOINT REPLACEMENT, HIP RT/LT      Joint Replacement Hip RT/LT-both replced in the last 5 years     TONSILLECTOMY         Family History   Problem Relation Age of Onset     Alzheimer Disease Mother      Hypertension Mother      HEART DISEASE Father      CHF     Alzheimer Disease Paternal Grandmother      Alzheimer Disease Paternal Grandfather      Cancer Brother      esophageal cancer     Prostate Cancer Brother      Prostate Cancer Brother      Diabetes Sister      Unknown/Adopted Son       adopted     Asthma No family hx of      C.A.D. No family hx of      Cerebrovascular Disease No family hx of      Breast Cancer No family hx of      Cancer - colorectal No family hx of      Alcohol/Drug No family hx of      Allergies No family hx of      Anesthesia Reaction No family hx of      Arthritis No family hx of      Blood Disease No family hx of      Cardiovascular No family hx of      Circulatory No family hx of      Congenital Anomalies No family hx of      Connective Tissue Disorder No family hx of      Depression No family hx of      Endocrine Disease No family hx of      Genetic Disorder No family hx of      Eye Disorder No family hx of      GASTROINTESTINAL DISEASE No family hx of      Genitourinary Problems No family hx of      Gynecology No family hx of      Lipids No family hx of      Musculoskeletal Disorder No family hx of      Neurologic Disorder No family hx of      Obesity No family hx of      Osteoporosis No family hx of      Psychotic Disorder No family hx of      Respiratory No family hx of      Thyroid Disease No family hx of      Family History Negative No family hx of      Hearing Loss No family hx of      Substance Abuse No family hx of      Mental Illness No family hx of      Anxiety Disorder No family hx of      Hyperlipidemia No family hx of        Social History     Social History     Marital status:      Spouse name: N/A     Number of children: N/A     Years of education: N/A     Occupational History     Not on file.     Social History Main Topics     Smoking status: Never Smoker     Smokeless tobacco: Never Used     Alcohol use Yes      Comment: occasionally     Drug use: No     Sexual activity: Yes     Partners: Female     Birth control/ protection: Condom      Comment: no protection     Other Topics Concern     Parent/Sibling W/ Cabg, Mi Or Angioplasty Before 65f 55m? No      Service No     Blood Transfusions No     Caffeine Concern No     Occupational Exposure No      Hobby Hazards No     Sleep Concern No     Does work at night     Stress Concern No     Weight Concern Yes     Overweight and needing weight loss     Special Diet Yes     Renal diet to be instructed     Back Care No     Exercise Yes     dancing mutiple times per week plus home work outs     Bike Helmet No     NA     Seat Belt Yes     Self-Exams Yes     Social History Narrative       Current Outpatient Prescriptions   Medication Sig Dispense Refill     aspirin 325 MG EC tablet Take 1 tablet by mouth daily. 100 tablet 3     atorvastatin (LIPITOR) 20 MG tablet Take 1 tablet (20 mg) by mouth daily 90 tablet 1     CIALIS 20 MG tablet Take 1/2 to 1 tablet by  mouth daily as needed,  never use with  nitroglycerin, terazosin,  or doxazosin (Patient not taking: Reported on 10/16/2017) 9 tablet 0     clindamycin (CLEOCIN) 300 MG capsule Take 1 capsule (300 mg) by mouth 4 times daily 30 capsule 0     Krill Oil 1000 MG CAPS Take by mouth daily       losartan (COZAAR) 50 MG tablet TAKE 1 TABLET EVERY DAY 90 tablet 1     Misc Natural Products (GLUCOSAMINE CHONDROITIN ADV PO) 2-3 tablets daily.        Multiple Vitamins-Minerals (MULTIVITAL PO) Once daily.        tamsulosin (FLOMAX) 0.4 MG capsule TAKE 2 CAPSULES EVERY  capsule 2       Allergies   Allergen Reactions     Lisinopril Cough     Persistent cough with Lisinopril       REVIEW OF SYSTEMS:  CONSTITUTIONAL:  NEGATIVE for fever, chills, change in weight, not feeling tired  SKIN:  NEGATIVE for worrisome rashes, no skin lumps, no skin ulcers and no non-healing wounds  EYES:  NEGATIVE for vision changes or irritation.  ENT/MOUTH:  NEGATIVE.  No hearing loss, no hoarseness, no difficulty swallowing.  RESP:  NEGATIVE. No cough or shortness of breath.  CV:  NEGATIVE for chest pain, palpitations or peripheral edema  GI:  NEGATIVE for nausea, abdominal pain, heartburn, or change in bowel habits  :  Negative. No dysuria, no hematuria  MUSCULOSKELETAL:  See HPI  "above  NEURO:  NEGATIVE . No headaches, no dizziness,  no numbness  ENDOCRINE:  NEGATIVE for temperature intolerance, skin/hair changes  HEME/ALLERGY/IMMUNE:  NEGATIVE for bleeding problems  PSYCHIATRIC:  NEGATIVE. no anxiety, no depression.     Exam:  Vitals: BP (!) 169/102 (BP Location: Left arm)  Resp 12  Ht 1.727 m (5' 8\")  Wt 108.9 kg (240 lb)  BMI 36.49 kg/m2  BMI= Body mass index is 36.49 kg/(m^2).  Constitutional:  healthy, alert and no distress  Neuro: Alert and Oriented x 3, Sensation grossly WNL.  Psych: Affect normal   Respiratory: Breathing not labored.  Cardiovascular: normal peripheral pulses  Lymph: no adenopathy  Skin: No rashes,worrisome lesions or skin problems      Again, thank you for allowing me to participate in the care of your patient.        Sincerely,        Viraj Martino MD    "

## 2018-09-07 DIAGNOSIS — E78.5 HYPERLIPIDEMIA LDL GOAL <100: ICD-10-CM

## 2018-09-07 DIAGNOSIS — N18.30 CKD (CHRONIC KIDNEY DISEASE) STAGE 3, GFR 30-59 ML/MIN (H): ICD-10-CM

## 2018-09-07 PROBLEM — M70.22 OLECRANON BURSITIS, LEFT ELBOW: Status: ACTIVE | Noted: 2018-09-07

## 2018-09-07 NOTE — PROGRESS NOTES
Visit Date:   09/06/2018      HISTORY OF PRESENT ILLNESS:  Mr. Pope is a 68-year-old male seen in consultation at the request of Dr. Vinod Hercules for evaluation of left olecranon bursitis.  The problem started about 07/30/2018 when he banged his elbow on a [facility]x at [facility].  He developed a swelling about the point of the elbow.  Eventually, he noted some drainage from it, had been placed on Keflex on 08/22/2018 and he reports a 7-day course, made swelling go down.  He continued to have some problems with it however, and saw Dr. Hercules 2 days ago and was restarted on clindamycin.  He has dull pain at the elbow, rated 2/10.  He has some persistent swelling of the area.  It gets worse if he bumps it or rests it on a hard surface.  When all this started, he had had an abrasion to the forearm as well, but that resolved without any specific treatment.  He is seen now for further evaluation.  He has not had any cultures done of this.      PHYSICAL EXAMINATION:  Moderate swelling about the left olecranon.  He has thick swelling.  It is minimally fluctuant.  He does have increased erythema and increased warmth.  He has no skin lesions or drainage.  He has full movement of the elbow without pain.  There is no effusion in the elbow joint.  Sensation and circulation are intact.      IMPRESSION:  Left olecranon bursitis, possibly septic.  It looks suspicious with the erythema and warmth.  We do not have a culture as of yet.  I have told him that we could try aspiration or continue with antibiotics and wait until he is off antibiotics before reassessing and obtain a culture then.  After a thorough discussion of the options, we elected to proceed with attempted aspiration.      After sterile prep, lidocaine was injected in a track over towards the olecranon bursa.  An 18-gauge needle was used to try and aspirate the fluid.  I did obtain some mostly clear blood-tinged fluid.  There was no purulence within this and  it may just be the Lidocaine injection that was flushed back.  We obtained about 1 mL of fluid and sent this for culture.  No other fluid pocket was present that I could get into and aspirate.      We will have him continue his clindamycin and will call if there are any positive results from the culture.  If not, we will see him back at least 3 days off antibiotics for reassessment.  We have applied Tubigrip and a Heelbo pad today.         BIA CANDELARIO MD             D: 2018   T: 2018   MT: KIRBY      Name:     FIDE HAN   MRN:      [id number]        Account:      [id number]   :      1950           Visit Date:   2018      Document: [id number]

## 2018-09-07 NOTE — PROGRESS NOTES
Driss Pope is a 68 year old male who is seen in consultation at the request of Dr. Vinod Hercules  for left olecranon bursitis.    Past Medical History:   Diagnosis Date     CKD (chronic kidney disease)      Hyperlipidemia LDL goal < 100      Hypertension goal BP (blood pressure) < 130/80      Hypertriglyceridemia      Obesity      Osteoarthritis        Past Surgical History:   Procedure Laterality Date     ARTHROSCOPY KNEE RT/LT      Left knee     COLONOSCOPY  10/4/2011    Procedure:COLONOSCOPY; Colonoscopy, screening; Surgeon:HANNAH COMER; [facility]     COLONOSCOPY  10/4/2011    Procedure:COMBINED COLONOSCOPY, REMOVE TUMOR/POLYP/LESION BY SNARE; Surgeon:HANNAH COMER; [facility]     COLONOSCOPY N/A 12/9/2016    Procedure: COMBINED COLONOSCOPY, SINGLE OR MULTIPLE BIOPSY/POLYPECTOMY BY BIOPSY;  Surgeon: Duane, William Charles, MD;  Location: [facility]     COLONOSCOPY WITH CO2 INSUFFLATION N/A 12/9/2016    Procedure: COLONOSCOPY WITH CO2 INSUFFLATION;  Surgeon: Duane, William Charles, MD;  Location: [facility]     COLONOSCOPY WITH CO2 INSUFFLATION N/A 3/15/2017    Procedure: COLONOSCOPY WITH CO2 INSUFFLATION;  Surgeon: Duane, William Charles, MD;  Location: [facility] OR     JOINT REPLACEMENT, HIP RT/LT      Joint Replacement Hip RT/LT-both replced in the last 5 years     TONSILLECTOMY         Family History   Problem Relation Age of Onset     Alzheimer Disease Mother      Hypertension Mother      HEART DISEASE Father      CHF     Alzheimer Disease Paternal Grandmother      Alzheimer Disease Paternal Grandfather      Cancer Brother      esophageal cancer     Prostate Cancer Brother      Prostate Cancer Brother      Diabetes Sister      Unknown/Adopted Son      adopted     Asthma No family hx of      C.A.D. No family hx of      Cerebrovascular Disease No family hx of      Breast Cancer No family hx of      Cancer - colorectal No family hx of      Alcohol/Drug No family hx of      Allergies No family hx of       Anesthesia Reaction No family hx of      Arthritis No family hx of      Blood Disease No family hx of      Cardiovascular No family hx of      Circulatory No family hx of      Congenital Anomalies No family hx of      Connective Tissue Disorder No family hx of      Depression No family hx of      Endocrine Disease No family hx of      Genetic Disorder No family hx of      Eye Disorder No family hx of      GASTROINTESTINAL DISEASE No family hx of      Genitourinary Problems No family hx of      Gynecology No family hx of      Lipids No family hx of      Musculoskeletal Disorder No family hx of      Neurologic Disorder No family hx of      Obesity No family hx of      Osteoporosis No family hx of      Psychotic Disorder No family hx of      Respiratory No family hx of      Thyroid Disease No family hx of      Family History Negative No family hx of      Hearing Loss No family hx of      Substance Abuse No family hx of      Mental Illness No family hx of      Anxiety Disorder No family hx of      Hyperlipidemia No family hx of        Social History     Social History     Marital status:      Spouse name: N/A     Number of children: N/A     Years of education: N/A     Occupational History     Not on file.     Social History Main Topics     Smoking status: Never Smoker     Smokeless tobacco: Never Used     Alcohol use Yes      Comment: occasionally     Drug use: No     Sexual activity: Yes     Partners: Female     Birth control/ protection: Condom      Comment: no protection     Other Topics Concern     Parent/Sibling W/ Cabg, Mi Or Angioplasty Before 65f 55m? No      Service No     Blood Transfusions No     Caffeine Concern No     Occupational Exposure No     Hobby Hazards No     Sleep Concern No     Does work at night     Stress Concern No     Weight Concern Yes     Overweight and needing weight loss     Special Diet Yes     Renal diet to be instructed     Back Care No     Exercise Yes     dancing  mutiple times per week plus home work outs     Bike Helmet No     NA     Seat Belt Yes     Self-Exams Yes     Social History Narrative       Current Outpatient Prescriptions   Medication Sig Dispense Refill     aspirin 325 MG EC tablet Take 1 tablet by mouth daily. 100 tablet 3     atorvastatin (LIPITOR) 20 MG tablet Take 1 tablet (20 mg) by mouth daily 90 tablet 1     CIALIS 20 MG tablet Take 1/2 to 1 tablet by  mouth daily as needed,  never use with  nitroglycerin, terazosin,  or doxazosin (Patient not taking: Reported on 10/16/2017) 9 tablet 0     clindamycin (CLEOCIN) 300 MG capsule Take 1 capsule (300 mg) by mouth 4 times daily 30 capsule 0     Krill Oil 1000 MG CAPS Take by mouth daily       losartan (COZAAR) 50 MG tablet TAKE 1 TABLET EVERY DAY 90 tablet 1     Misc Natural Products (GLUCOSAMINE CHONDROITIN ADV PO) 2-3 tablets daily.        Multiple Vitamins-Minerals (MULTIVITAL PO) Once daily.        tamsulosin (FLOMAX) 0.4 MG capsule TAKE 2 CAPSULES EVERY  capsule 2       Allergies   Allergen Reactions     Lisinopril Cough     Persistent cough with Lisinopril       REVIEW OF SYSTEMS:  CONSTITUTIONAL:  NEGATIVE for fever, chills, change in weight, not feeling tired  SKIN:  NEGATIVE for worrisome rashes, no skin lumps, no skin ulcers and no non-healing wounds  EYES:  NEGATIVE for vision changes or irritation.  ENT/MOUTH:  NEGATIVE.  No hearing loss, no hoarseness, no difficulty swallowing.  RESP:  NEGATIVE. No cough or shortness of breath.  CV:  NEGATIVE for chest pain, palpitations or peripheral edema  GI:  NEGATIVE for nausea, abdominal pain, heartburn, or change in bowel habits  :  Negative. No dysuria, no hematuria  MUSCULOSKELETAL:  See HPI above  NEURO:  NEGATIVE . No headaches, no dizziness,  no numbness  ENDOCRINE:  NEGATIVE for temperature intolerance, skin/hair changes  HEME/ALLERGY/IMMUNE:  NEGATIVE for bleeding problems  PSYCHIATRIC:  NEGATIVE. no anxiety, no depression.     Exam:  Vitals:  "BP (!) 169/102 (BP Location: Left arm)  Resp 12  Ht 1.727 m (5' 8\")  Wt 108.9 kg (240 lb)  BMI 36.49 kg/m2  BMI= Body mass index is 36.49 kg/(m^2).  Constitutional:  healthy, alert and no distress  Neuro: Alert and Oriented x 3, Sensation grossly WNL.  Psych: Affect normal   Respiratory: Breathing not labored.  Cardiovascular: normal peripheral pulses  Lymph: no adenopathy  Skin: No rashes,worrisome lesions or skin problems    "

## 2018-09-10 RX ORDER — ATORVASTATIN CALCIUM 20 MG/1
TABLET, FILM COATED ORAL
Qty: 90 TABLET | Refills: 1 | OUTPATIENT
Start: 2018-09-10

## 2018-09-10 NOTE — TELEPHONE ENCOUNTER
Lipitor:  Sent 6/18/18 with 6 month supply. Refill not appropriate at this time.     Next 5 appointments (look out 90 days)     Oct 09, 2018  9:30 AM CDT   Return Visit with Rosalie Fitzgerald MD   [facility]    [address] [place] [id number]   [phone]                Vianney Geiger RN, BSN

## 2018-09-11 LAB
BACTERIA SPEC CULT: NO GROWTH
SPECIMEN SOURCE: NORMAL

## 2018-09-20 ENCOUNTER — OFFICE VISIT (OUTPATIENT)
Dept: ORTHOPEDICS | Facility: CLINIC | Age: 68
End: 2018-09-20
Payer: COMMERCIAL

## 2018-09-20 VITALS
WEIGHT: 242 LBS | HEART RATE: 85 BPM | OXYGEN SATURATION: 96 % | RESPIRATION RATE: 13 BRPM | DIASTOLIC BLOOD PRESSURE: 92 MMHG | HEIGHT: 68 IN | SYSTOLIC BLOOD PRESSURE: 163 MMHG | BODY MASS INDEX: 36.68 KG/M2

## 2018-09-20 DIAGNOSIS — M70.22 OLECRANON BURSITIS, LEFT ELBOW: Primary | ICD-10-CM

## 2018-09-20 PROCEDURE — 99212 OFFICE O/P EST SF 10 MIN: CPT | Performed by: ORTHOPAEDIC SURGERY

## 2018-09-20 NOTE — MR AVS SNAPSHOT
After Visit Summary   9/20/2018    Driss Pope    MRN: [id number]           Patient Information     Date Of Birth          1950        Visit Information        Provider Department      9/20/2018 9:00 AM Viraj Martino MD [facility]        Today's Diagnoses     Olecranon bursitis, left elbow    -  1       Follow-ups after your visit        Your next 10 appointments already scheduled     Oct 09, 2018  9:00 AM CDT   LAB with LAB FIRST FLOOR [facility])    [address] [place] [id number] [phone]           Please do not eat 10-12 hours before your appointment if you are coming in fasting for labs on lipids, cholesterol, or glucose (sugar). This does not apply to pregnant women. Water, hot tea and black coffee (with nothing added) are okay. Do not drink other fluids, diet soda or chew gum.            Oct 09, 2018  9:30 AM CDT   Return Visit with Rosalie Fitzgerald MD   [facility])    [address] [place] [address] [id number] [phone]              Who to contact     If you have questions or need follow up information about today's clinic visit or your schedule please contact [facility] directly at [phone].  Normal or non-critical lab and imaging results will be communicated to you by MyChart, letter or phone within 4 business days after the clinic has received the results. If you do not hear from us within 7 days, please contact the clinic through MyChart or phone. If you have a critical or abnormal lab result, we will notify you by phone as soon as possible.  Submit refill requests through [employer] or call your pharmacy and they will forward the refill request to us. Please allow 3 business days for your refill to be completed.          Additional Information About Your Visit        [employer] Information     [employer] [employer] gives you  "secure access to your electronic health record. If you see a primary care provider, you can also send messages to your care team and make appointments. If you have questions, please call your primary care clinic.  If you do not have a primary care provider, please call [phone] and they will assist you.        Care EveryWhere ID     This is your Care EveryWhere ID. This could be used by other organizations to access your [place] medical records  [id number]        Your Vitals Were     Pulse Respirations Height Pulse Oximetry BMI (Body Mass Index)       85 13 1.727 m (5' 8\") 96% 36.8 kg/m2        Blood Pressure from Last 3 Encounters:   09/20/18 (!) 163/92   09/06/18 (!) 169/102   09/04/18 108/78    Weight from Last 3 Encounters:   09/20/18 109.8 kg (242 lb)   09/06/18 108.9 kg (240 lb)   09/04/18 110.5 kg (243 lb 8 oz)              Today, you had the following     No orders found for display       Primary Care Provider Office Phone # Fax #    Vinod Hercules [phone] [address] [facility] [address]        Equal Access to Services     YESSICA KIRAN : Hadii aad ku hadasho Soomaali, waaxda luqadaha, qaybta kaalmada adeegyada, karlene jin haytomn rukhsana ashby. So [facility] [place] [facility] [phone].    ATENCIÓN: Si habla español, tiene a fitzgerald disposición servicios gratuitos de asistencia lingüística. Llame al [phone].    We comply with applicable federal civil rights laws and [place] laws. We do not discriminate on the basis of race, color, national origin, age, disability, sex, sexual orientation, or gender identity.            Thank you!     Thank you for choosing [facility]  for your care. Our goal is always to provide you with excellent care. Hearing back from our patients is one way we can continue to improve our services. Please take a few minutes to complete the written survey that you may receive in the mail after your visit with us. Thank you!             Your " Updated Medication List - Protect others around you: Learn how to safely use, store and throw away your medicines at www.disposemymeds.org.          This list is accurate as of 9/20/18  9:33 AM.  Always use your most recent med list.                   Brand Name Dispense Instructions for use Diagnosis    aspirin 325 MG EC tablet     100 tablet    Take 1 tablet by mouth daily.    CKD (chronic kidney disease) stage 2, GFR 60-89 ml/min, Hypertension goal BP (blood pressure) < 130/80       atorvastatin 20 MG tablet    LIPITOR    90 tablet    Take 1 tablet (20 mg) by mouth daily    Hyperlipidemia LDL goal <100, CKD (chronic kidney disease) stage 3, GFR 30-59 ml/min       CIALIS 20 MG tablet   Generic drug:  tadalafil     9 tablet    Take 1/2 to 1 tablet by  mouth daily as needed,  never use with  nitroglycerin, terazosin,  or doxazosin    ED (erectile dysfunction)       clindamycin 300 MG capsule    CLEOCIN    30 capsule    Take 1 capsule (300 mg) by mouth 4 times daily    Cellulitis of left upper extremity, Olecranon bursitis of left elbow       GLUCOSAMINE CHONDROITIN ADV PO      2-3 tablets daily.        Krill Oil 1000 MG Caps      Take by mouth daily        losartan 50 MG tablet    COZAAR    90 tablet    TAKE 1 TABLET EVERY DAY    Hypertension, goal below 140/90, CKD (chronic kidney disease) stage 3, GFR 30-59 ml/min       MULTIVITAL PO      Once daily.        tamsulosin 0.4 MG capsule    FLOMAX    180 capsule    TAKE 2 CAPSULES EVERY DAY    Benign non-nodular prostatic hyperplasia with lower urinary tract symptoms

## 2018-09-20 NOTE — PROGRESS NOTES
Follow up left septic olecranon bursitis.  Aspiration on 9/6/18 showed no growth.  He had been treated with Keflex followed by clindamycin.  He has been off clindamycin at least a week now.    Exam shows no free fluid in bursa.  Mild thickening of the bursa.  No erythema or increased warmth.  No tenderness.  He has full range of motion of elbow.    Assessment:  Left olecranon bursitis now resolved.  Plan:  Elbow pad advised if leaning on elbow.  Avoid leaning on elbow as much as possible.  Resume activity as tolerated.

## 2018-09-20 NOTE — LETTER
9/20/2018         RE: Driss Pope  [address]  [place] [address]        Dear Colleague,    Thank you for referring your patient, Driss Pope, to the [facility]. Please see a copy of my visit note below.    Follow up left septic olecranon bursitis.  Aspiration on 9/6/18 showed no growth.  He had been treated with Keflex followed by clindamycin.  He has been off clindamycin at least a week now.    Exam shows no free fluid in bursa.  Mild thickening of the bursa.  No erythema or increased warmth.  No tenderness.  He has full range of motion of elbow.    Assessment:  Left olecranon bursitis now resolved.  Plan:  Elbow pad advised if leaning on elbow.  Avoid leaning on elbow as much as possible.  Resume activity as tolerated.    Again, thank you for allowing me to participate in the care of your patient.        Sincerely,        Viraj Martino MD

## 2018-10-02 DIAGNOSIS — N18.30 CKD (CHRONIC KIDNEY DISEASE) STAGE 3, GFR 30-59 ML/MIN (H): Primary | ICD-10-CM

## 2018-10-09 ENCOUNTER — OFFICE VISIT (OUTPATIENT)
Dept: NEPHROLOGY | Facility: CLINIC | Age: 68
End: 2018-10-09
Payer: COMMERCIAL

## 2018-10-09 VITALS
DIASTOLIC BLOOD PRESSURE: 89 MMHG | WEIGHT: 249 LBS | SYSTOLIC BLOOD PRESSURE: 135 MMHG | OXYGEN SATURATION: 95 % | BODY MASS INDEX: 37.86 KG/M2 | HEART RATE: 62 BPM

## 2018-10-09 DIAGNOSIS — I10 HYPERTENSION, GOAL BELOW 140/90: ICD-10-CM

## 2018-10-09 DIAGNOSIS — N25.81 SECONDARY RENAL HYPERPARATHYROIDISM (H): ICD-10-CM

## 2018-10-09 DIAGNOSIS — Z23 NEED FOR PROPHYLACTIC VACCINATION AND INOCULATION AGAINST INFLUENZA: Primary | ICD-10-CM

## 2018-10-09 DIAGNOSIS — N18.30 CKD (CHRONIC KIDNEY DISEASE) STAGE 3, GFR 30-59 ML/MIN (H): ICD-10-CM

## 2018-10-09 LAB
ALBUMIN SERPL-MCNC: 4.1 G/DL (ref 3.4–5)
ANION GAP SERPL CALCULATED.3IONS-SCNC: 4 MMOL/L (ref 3–14)
BUN SERPL-MCNC: 21 MG/DL (ref 7–30)
CALCIUM SERPL-MCNC: 9.2 MG/DL (ref 8.5–10.1)
CHLORIDE SERPL-SCNC: 108 MMOL/L (ref 94–109)
CO2 SERPL-SCNC: 31 MMOL/L (ref 20–32)
CREAT SERPL-MCNC: 1.37 MG/DL (ref 0.66–1.25)
CREAT UR-MCNC: 106 MG/DL
GFR SERPL CREATININE-BSD FRML MDRD: 51 ML/MIN/1.7M2
GLUCOSE SERPL-MCNC: 104 MG/DL (ref 70–99)
HGB BLD-MCNC: 14 G/DL (ref 13.3–17.7)
PHOSPHATE SERPL-MCNC: 2.8 MG/DL (ref 2.5–4.5)
POTASSIUM SERPL-SCNC: 4.3 MMOL/L (ref 3.4–5.3)
PROT UR-MCNC: 0.07 G/L
PROT/CREAT 24H UR: 0.07 G/G CR (ref 0–0.2)
PTH-INTACT SERPL-MCNC: 80 PG/ML (ref 18–80)
SODIUM SERPL-SCNC: 143 MMOL/L (ref 133–144)

## 2018-10-09 PROCEDURE — 90662 IIV NO PRSV INCREASED AG IM: CPT | Performed by: INTERNAL MEDICINE

## 2018-10-09 PROCEDURE — 99214 OFFICE O/P EST MOD 30 MIN: CPT | Performed by: INTERNAL MEDICINE

## 2018-10-09 PROCEDURE — 36415 COLL VENOUS BLD VENIPUNCTURE: CPT | Performed by: INTERNAL MEDICINE

## 2018-10-09 PROCEDURE — 83970 ASSAY OF PARATHORMONE: CPT | Performed by: INTERNAL MEDICINE

## 2018-10-09 PROCEDURE — 84156 ASSAY OF PROTEIN URINE: CPT | Performed by: INTERNAL MEDICINE

## 2018-10-09 PROCEDURE — G0008 ADMIN INFLUENZA VIRUS VAC: HCPCS | Performed by: INTERNAL MEDICINE

## 2018-10-09 PROCEDURE — 80069 RENAL FUNCTION PANEL: CPT | Performed by: INTERNAL MEDICINE

## 2018-10-09 PROCEDURE — 85018 HEMOGLOBIN: CPT | Performed by: INTERNAL MEDICINE

## 2018-10-09 RX ORDER — LOSARTAN POTASSIUM 100 MG/1
100 TABLET ORAL DAILY
Qty: 90 TABLET | Refills: 3 | Status: SHIPPED | OUTPATIENT
Start: 2018-10-09 | End: 2019-03-29

## 2018-10-09 ASSESSMENT — PAIN SCALES - GENERAL: PAINLEVEL: NO PAIN (0)

## 2018-10-09 NOTE — PATIENT INSTRUCTIONS
1. Losartan to 100 mg daily  2. Repeat labs in 1-2 weeks.   3. One year follow-up or sooner as needed.   4. Flu shot today

## 2018-10-09 NOTE — MR AVS SNAPSHOT
After Visit Summary   10/9/2018    Driss Pope    MRN: [id number]           Patient Information     Date Of Birth          1950        Visit Information        Provider Department      10/9/2018 9:30 AM Rosalie Fitzgerald MD [facility]        Today's Diagnoses     Hypertension, goal below 140/90        CKD (chronic kidney disease) stage 3, GFR 30-59 ml/min (H)          Care Instructions    1. Losartan to 100 mg daily  2. Repeat labs in 1-2 weeks.   3. One year follow-up or sooner as needed.   4. Flu shot today          Follow-ups after your visit        Your next 10 appointments already scheduled     Oct 22, 2018  9:50 AM CDT   LAB with LAB FIRST FLOOR [facility]    [address] [place] [id number] [phone]           Please do not eat 10-12 hours before your appointment if you are coming in fasting for labs on lipids, cholesterol, or glucose (sugar). This does not apply to pregnant women. Water, hot tea and black coffee (with nothing added) are okay. Do not drink other fluids, diet soda or chew gum.            Oct 08, 2019  9:00 AM CDT   LAB with LAB FIRST FLOOR [facility]    [address] [place] [address] [phone] [phone]           Please do not eat 10-12 hours before your appointment if you are coming in fasting for labs on lipids, cholesterol, or glucose (sugar). This does not apply to pregnant women. Water, hot tea and black coffee (with nothing added) are okay. Do not drink other fluids, diet soda or chew gum.            Oct 08, 2019  9:30 AM CDT   Return Visit with Rosalie Fitzgerald MD   [facility])    [address] [address] [place] [address] [phone]              Future tests that were ordered for you today     Open Future Orders        Priority Expected  Expires Ordered    Basic metabolic panel Routine 10/16/2018 10/9/2019 10/9/2018            Who to contact     If you have questions or need follow up information about today's clinic visit or your schedule please contact [facility] directly at [phone].  Normal or non-critical lab and imaging results will be communicated to you by [employer], letter or phone within 4 business days after the clinic has received the results. If you do not hear from us within 7 days, please contact the clinic through [employer] or phone. If you have a critical or abnormal lab result, we will notify you by phone as soon as possible.  Submit refill requests through [email or web address] or call your pharmacy and they will forward the refill request to us. Please allow 3 business days for your refill to be completed.          Additional Information About Your Visit        [employer] Information     [email or web address] gives you secure access to your electronic health record. If you see a primary care provider, you can also send messages to your care team and make appointments. If you have questions, please call your primary care clinic.  If you do not have a primary care provider, please call [phone] and they will assist you.      [email or web address] is an electronic gateway that provides easy, online access to your medical records. With [email or web address], you can request a clinic appointment, read your test results, renew a prescription or communicate with your care team.     To access your existing account, please contact your [facility] or call [phone] for assistance.        Care EveryWhere ID     This is your Care EveryWhere ID. This could be used by other organizations to access your [place] medical records  [id number]        Your Vitals Were     Pulse Pulse Oximetry BMI (Body Mass Index)             62 95% 37.86 kg/m2          Blood Pressure from Last 3 Encounters:   10/09/18 135/89   09/20/18 (!) 163/92   09/06/18 (!)  169/102    Weight from Last 3 Encounters:   10/09/18 112.9 kg (249 lb)   09/20/18 109.8 kg (242 lb)   09/06/18 108.9 kg (240 lb)                 Today's Medication Changes          These changes are accurate as of 10/9/18 10:27 AM.  If you have any questions, ask your nurse or doctor.               These medicines have changed or have updated prescriptions.        Dose/Directions    losartan 100 MG tablet   Commonly known as:  COZAAR   This may have changed:    - medication strength  - See the new instructions.   Used for:  Hypertension, goal below 140/90, CKD (chronic kidney disease) stage 3, GFR 30-59 ml/min (H)   Changed by:  Rosalie Fitzgerald MD        Dose:  100 mg   Take 1 tablet (100 mg) by mouth daily   Quantity:  90 tablet   Refills:  3            Where to get your medicines      These medications were sent to [facility] - [facility] [address]  [address], [facility] [address]     Phone:  [phone]     losartan 100 MG tablet                Primary Care Provider Office Phone # Fax #    Vinod Hercules [phone] [address] [facility] [address]        Equal Access to Services     YESSICA KIRAN : Hadii pernell hunter hadasho Soomaali, waaxda luqadaha, qaybta kaalmada adeegyada, karlene ashby. So [facility] [phone].    ATENCIÓN: Si habla español, tiene a fitzgerald disposición servicios gratuitos de asistencia lingüística. Llame al [phone].    We comply with applicable federal civil rights laws and [place] laws. We do not discriminate on the basis of race, color, national origin, age, disability, sex, sexual orientation, or gender identity.            Thank you!     Thank you for choosing [facility]  for your care. Our goal is always to provide you with excellent care. Hearing back from our patients is one way we can continue to improve our services. Please take a few minutes to complete the  written survey that you may receive in the mail after your visit with us. Thank you!             Your Updated Medication List - Protect others around you: Learn how to safely use, store and throw away your medicines at www.disposemymeds.org.          This list is accurate as of 10/9/18 10:27 AM.  Always use your most recent med list.                   Brand Name Dispense Instructions for use Diagnosis    aspirin 325 MG EC tablet     100 tablet    Take 1 tablet by mouth daily.    CKD (chronic kidney disease) stage 2, GFR 60-89 ml/min, Hypertension goal BP (blood pressure) < 130/80       atorvastatin 20 MG tablet    LIPITOR    90 tablet    Take 1 tablet (20 mg) by mouth daily    Hyperlipidemia LDL goal <100, CKD (chronic kidney disease) stage 3, GFR 30-59 ml/min (H)       CIALIS 20 MG tablet   Generic drug:  tadalafil     9 tablet    Take 1/2 to 1 tablet by  mouth daily as needed,  never use with  nitroglycerin, terazosin,  or doxazosin    ED (erectile dysfunction)       clindamycin 300 MG capsule    CLEOCIN    30 capsule    Take 1 capsule (300 mg) by mouth 4 times daily    Cellulitis of left upper extremity, Olecranon bursitis of left elbow       GLUCOSAMINE CHONDROITIN ADV PO      2-3 tablets daily.        Krill Oil 1000 MG Caps      Take by mouth daily        losartan 100 MG tablet    COZAAR    90 tablet    Take 1 tablet (100 mg) by mouth daily    Hypertension, goal below 140/90, CKD (chronic kidney disease) stage 3, GFR 30-59 ml/min (H)       MULTIVITAL PO      Once daily.        tamsulosin 0.4 MG capsule    FLOMAX    180 capsule    TAKE 2 CAPSULES EVERY DAY    Benign non-nodular prostatic hyperplasia with lower urinary tract symptoms

## 2018-10-09 NOTE — PROGRESS NOTES

## 2018-10-09 NOTE — NURSING NOTE
Driss Pope's goals for this visit include:   Chief Complaint   Patient presents with     RECHECK     yrly recheck CKD       He requests these members of his care team be copied on today's visit information: no    PCP: Vinod Hercules    Referring Provider:  No referring provider defined for this encounter.    BP (!) 151/94 (BP Location: Left arm, Patient Position: Sitting, Cuff Size: Adult Regular)  Pulse 62  Wt 112.9 kg (249 lb)  SpO2 95%  BMI 37.86 kg/m2    Do you need any medication refills at today's visit? No    Shraddha Lira LPN

## 2018-10-09 NOTE — NURSING NOTE
Prior to injection verified patient identity using patient's name and date of birth.  Due to injection administration, patient instructed to remain in clinic for 15 minutes  afterwards, and to report any adverse reaction to me immediately.  Shraddha Lira LPN

## 2018-10-22 DIAGNOSIS — N18.30 CKD (CHRONIC KIDNEY DISEASE) STAGE 3, GFR 30-59 ML/MIN (H): ICD-10-CM

## 2018-10-22 LAB
ALBUMIN SERPL-MCNC: 4 G/DL (ref 3.4–5)
ANION GAP SERPL CALCULATED.3IONS-SCNC: 4 MMOL/L (ref 3–14)
BUN SERPL-MCNC: 19 MG/DL (ref 7–30)
CALCIUM SERPL-MCNC: 9.5 MG/DL (ref 8.5–10.1)
CHLORIDE SERPL-SCNC: 108 MMOL/L (ref 94–109)
CO2 SERPL-SCNC: 31 MMOL/L (ref 20–32)
CREAT SERPL-MCNC: 1.32 MG/DL (ref 0.66–1.25)
GFR SERPL CREATININE-BSD FRML MDRD: 54 ML/MIN/1.7M2
GLUCOSE SERPL-MCNC: 103 MG/DL (ref 70–99)
PHOSPHATE SERPL-MCNC: 2.9 MG/DL (ref 2.5–4.5)
POTASSIUM SERPL-SCNC: 4.4 MMOL/L (ref 3.4–5.3)
SODIUM SERPL-SCNC: 143 MMOL/L (ref 133–144)

## 2018-10-22 PROCEDURE — 80069 RENAL FUNCTION PANEL: CPT | Performed by: INTERNAL MEDICINE

## 2018-10-22 PROCEDURE — 36415 COLL VENOUS BLD VENIPUNCTURE: CPT | Performed by: INTERNAL MEDICINE

## 2018-10-29 NOTE — PROGRESS NOTES
10/9/18    CC: Chronic Kidney Disease (CKD)    HPI: Driss Pope is a 68 year old male who presents for follow-up of stage 3 CKD thought due to hypertension. He is here for his one year follow-up. He continues to be active with his dancing - edema does occur at times. He unfortunately tore his meniscus since last visit - no surgery but treated with cortisone alone in July. Creaitnine has been 1.34-1.36; 1.37 now. He takes tylenol and some advil at times although does not potential harm of NSAIDs. Blood pressure at home has been 140-150.     Allergies   Allergen Reactions     Lisinopril Cough     Persistent cough with Lisinopril         Current Outpatient Prescriptions on File Prior to Visit:  aspirin 325 MG EC tablet Take 1 tablet by mouth daily.   atorvastatin (LIPITOR) 20 MG tablet Take 1 tablet (20 mg) by mouth daily   Krill Oil 1000 MG CAPS Take by mouth daily   Misc Natural Products (GLUCOSAMINE CHONDROITIN ADV PO) 2-3 tablets daily.    Multiple Vitamins-Minerals (MULTIVITAL PO) Once daily.    tamsulosin (FLOMAX) 0.4 MG capsule TAKE 2 CAPSULES EVERY DAY   CIALIS 20 MG tablet Take 1/2 to 1 tablet by  mouth daily as needed,  never use with  nitroglycerin, terazosin,  or doxazosin (Patient not taking: Reported on 10/9/2018)   clindamycin (CLEOCIN) 300 MG capsule Take 1 capsule (300 mg) by mouth 4 times daily (Patient not taking: Reported on 10/9/2018)     No current facility-administered medications on file prior to visit.     Past Medical History:   Diagnosis Date     CKD (chronic kidney disease)      Hyperlipidemia LDL goal < 100      Hypertension goal BP (blood pressure) < 130/80      Hypertriglyceridemia      Obesity      Osteoarthritis        Social History   Substance Use Topics     Smoking status: Never Smoker     Smokeless tobacco: Never Used     Alcohol use Yes      Comment: occasionally         Family History   Problem Relation Age of Onset     Alzheimer Disease Mother      Hypertension Mother       HEART DISEASE Father      CHF     Alzheimer Disease Paternal Grandmother      Alzheimer Disease Paternal Grandfather      Cancer Brother      esophageal cancer     Prostate Cancer Brother      Prostate Cancer Brother      Diabetes Sister      Unknown/Adopted Son      adopted     Asthma No family hx of      C.A.D. No family hx of      Cerebrovascular Disease No family hx of      Breast Cancer No family hx of      Cancer - colorectal No family hx of      Alcohol/Drug No family hx of      Allergies No family hx of      Anesthesia Reaction No family hx of      Arthritis No family hx of      Blood Disease No family hx of      Cardiovascular No family hx of      Circulatory No family hx of      Congenital Anomalies No family hx of      Connective Tissue Disorder No family hx of      Depression No family hx of      Endocrine Disease No family hx of      Genetic Disorder No family hx of      Eye Disorder No family hx of      GASTROINTESTINAL DISEASE No family hx of      Genitourinary Problems No family hx of      Gynecology No family hx of      Lipids No family hx of      Musculoskeletal Disorder No family hx of      Neurologic Disorder No family hx of      Obesity No family hx of      Osteoporosis No family hx of      Psychotic Disorder No family hx of      Respiratory No family hx of      Thyroid Disease No family hx of      Family History Negative No family hx of      Hearing Loss No family hx of      Substance Abuse No family hx of      Mental Illness No family hx of      Anxiety Disorder No family hx of      Hyperlipidemia No family hx of          ROS: A 4 system review of systems was negative other than noted here or above.     Exam:  /89 (BP Location: Left arm, Patient Position: Sitting, Cuff Size: Adult Large)  Pulse 62  Wt 112.9 kg (249 lb)  SpO2 95%  BMI 37.86 kg/m2  GENERAL APPEARANCE: alert and no distress  RESP: lungs clear to auscultation  CV: regular rhythm, normal rate, no rub  EXT: no lower ext  edema bilaterally  SKIN: no rash  NEURO: mentation intact and speech normal  PSYCH: affect normal/bright    Orders Only on 10/09/2018   Component Date Value Ref Range Status     Hemoglobin 10/09/2018 14.0  13.3 - 17.7 g/dL Final     Parathyroid Hormone Intact 10/09/2018 80  18 - 80 pg/mL Final     Protein Random Urine 10/09/2018 0.07  g/L Final     Protein Total Urine g/gr Creatinine 10/09/2018 0.07  0 - 0.2 g/g Cr Final     Sodium 10/09/2018 143  133 - 144 mmol/L Final     Potassium 10/09/2018 4.3  3.4 - 5.3 mmol/L Final     Chloride 10/09/2018 108  94 - 109 mmol/L Final     Carbon Dioxide 10/09/2018 31  20 - 32 mmol/L Final     Anion Gap 10/09/2018 4  3 - 14 mmol/L Final     Glucose 10/09/2018 104* 70 - 99 mg/dL Final    Non Fasting     Urea Nitrogen 10/09/2018 21  7 - 30 mg/dL Final     Creatinine 10/09/2018 1.37* 0.66 - 1.25 mg/dL Final     GFR Estimate 10/09/2018 51* >60 mL/min/1.7m2 Final    Non  GFR Calc     GFR Estimate If Black 10/09/2018 62  >60 mL/min/1.7m2 Final    African American GFR Calc     Calcium 10/09/2018 9.2  8.5 - 10.1 mg/dL Final     Phosphorus 10/09/2018 2.8  2.5 - 4.5 mg/dL Final     Albumin 10/09/2018 4.1  3.4 - 5.0 g/dL Final     Creatinine Urine 10/09/2018 106  mg/dL Final      Assessment/Plan:  89.3F CKD (chronic kidney disease) stage 3, GFR 30-59 ml/min  Comment: His CKD is probably related to longstanding hypertension. Kidney function stable and no proteinuria previously. Will plan to follow-up in one year. Will increase losartan to 100 mg daily. Educated to avoid NSAIDs.     Hypertension: above goal - increasing losartan to 100. .    Edema: resolved    Secondary Hyperparathyroidism, renal: PTH 74 in October last year; due for vitamin D studies.     Patient Instructions   1. Losartan to 100 mg daily  2. Repeat labs in 1-2 weeks.   3. One year follow-up or sooner as needed.   4. Flu shot today       Rosalie Fitzgerald, DO

## 2018-11-08 DIAGNOSIS — N40.1 BENIGN NON-NODULAR PROSTATIC HYPERPLASIA WITH LOWER URINARY TRACT SYMPTOMS: ICD-10-CM

## 2018-11-09 RX ORDER — TAMSULOSIN HYDROCHLORIDE 0.4 MG/1
CAPSULE ORAL
Qty: 180 CAPSULE | Refills: 1 | Status: SHIPPED | OUTPATIENT
Start: 2018-11-09 | End: 2019-03-19

## 2018-11-09 NOTE — TELEPHONE ENCOUNTER
"Requested Prescriptions   Pending Prescriptions Disp Refills     tamsulosin (FLOMAX) 0.4 MG capsule [Pharmacy Med Name: TAMSULOSIN HCL 0.4 MG Capsule] 180 capsule 2     Sig: TAKE 2 CAPSULES EVERY DAY    Alpha Blockers Failed    11/8/2018 12:03 PM       Failed - Patient does not have Tadalafil, Vardenafil, or Sildenafil on their medication list       Passed - Blood pressure under 140/90 in past 12 months    BP Readings from Last 3 Encounters:   10/09/18 135/89   09/20/18 (!) 163/92   09/06/18 (!) 169/102                Passed - Recent (12 mo) or future (30 days) visit within the authorizing provider's specialty    Patient had office visit in the last 12 months or has a visit in the next 30 days with authorizing provider or within the authorizing provider's specialty.  See \"Patient Info\" tab in inbasket, or \"Choose Columns\" in Meds & Orders section of the refill encounter.             Passed - Patient is 18 years of age or older        Routing refill request to provider for review/approval because:  Has ED medication on MAR, but according to MAR not taking as of 10/09/2018.    Jimmy Gandhi, RN, BSN      "

## 2018-12-11 ENCOUNTER — OFFICE VISIT (OUTPATIENT)
Dept: PEDIATRICS | Facility: CLINIC | Age: 68
End: 2018-12-11
Payer: COMMERCIAL

## 2018-12-11 VITALS
HEART RATE: 95 BPM | OXYGEN SATURATION: 96 % | HEIGHT: 68 IN | SYSTOLIC BLOOD PRESSURE: 142 MMHG | WEIGHT: 249.3 LBS | BODY MASS INDEX: 37.78 KG/M2 | TEMPERATURE: 96.7 F | DIASTOLIC BLOOD PRESSURE: 70 MMHG

## 2018-12-11 DIAGNOSIS — N18.30 CKD (CHRONIC KIDNEY DISEASE) STAGE 3, GFR 30-59 ML/MIN (H): ICD-10-CM

## 2018-12-11 DIAGNOSIS — M10.9 ACUTE GOUTY ARTHRITIS: Primary | ICD-10-CM

## 2018-12-11 PROCEDURE — 99213 OFFICE O/P EST LOW 20 MIN: CPT | Performed by: INTERNAL MEDICINE

## 2018-12-11 RX ORDER — PREDNISONE 20 MG/1
TABLET ORAL
Qty: 12 TABLET | Refills: 0 | Status: SHIPPED | OUTPATIENT
Start: 2018-12-11 | End: 2019-03-29

## 2018-12-11 ASSESSMENT — MIFFLIN-ST. JEOR: SCORE: 1870.32

## 2018-12-11 NOTE — NURSING NOTE
"Chief Complaint   Patient presents with     Musculoskeletal Problem     right foot pain x 2-3 days, no known injuries       Initial /70 (BP Location: Right arm, Patient Position: Sitting, Cuff Size: Adult Large)   Pulse 95   Temp 96.7  F (35.9  C) (Temporal)   Ht 1.727 m (5' 8\")   Wt 113.1 kg (249 lb 4.8 oz)   SpO2 96%   BMI 37.91 kg/m   Estimated body mass index is 37.91 kg/m  as calculated from the following:    Height as of this encounter: 1.727 m (5' 8\").    Weight as of this encounter: 113.1 kg (249 lb 4.8 oz).  Medication Reconciliation: complete      SHARYN Olea      "

## 2018-12-14 DIAGNOSIS — N18.30 CKD (CHRONIC KIDNEY DISEASE) STAGE 3, GFR 30-59 ML/MIN (H): ICD-10-CM

## 2018-12-14 DIAGNOSIS — E78.5 HYPERLIPIDEMIA LDL GOAL <100: ICD-10-CM

## 2018-12-14 RX ORDER — ATORVASTATIN CALCIUM 20 MG/1
TABLET, FILM COATED ORAL
Qty: 90 TABLET | Refills: 0 | Status: SHIPPED | OUTPATIENT
Start: 2018-12-14 | End: 2019-03-19

## 2018-12-14 NOTE — TELEPHONE ENCOUNTER
"Requested Prescriptions   Pending Prescriptions Disp Refills     atorvastatin (LIPITOR) 20 MG tablet [Pharmacy Med Name: ATORVASTATIN CALCIUM 20 MG Tablet] 90 tablet 1     Sig: TAKE 1 TABLET EVERY DAY    Statins Protocol Passed - 12/14/2018 10:46 AM       Passed - LDL on file in past 12 months    Recent Labs   Lab Test 03/26/18  0906   LDL 72          Passed - No abnormal creatine kinase in past 12 months    Recent Labs   Lab Test 06/27/12  0843             Passed - Recent (12 mo) or future (30 days) visit within the authorizing provider's specialty    Patient had office visit in the last 12 months or has a visit in the next 30 days with authorizing provider or within the authorizing provider's specialty.  See \"Patient Info\" tab in inbasket, or \"Choose Columns\" in Meds & Orders section of the refill encounter.         Passed - Patient is age 18 or older        atorvastatin (LIPITOR) 20 MG tablet  Prescription approved per [facility] Refill Protocol.    Blanquita Gates RN, BSN     "

## 2019-03-19 DIAGNOSIS — N18.30 CKD (CHRONIC KIDNEY DISEASE) STAGE 3, GFR 30-59 ML/MIN (H): ICD-10-CM

## 2019-03-19 DIAGNOSIS — E78.5 HYPERLIPIDEMIA LDL GOAL <100: ICD-10-CM

## 2019-03-19 NOTE — LETTER
March 21, 2019      Driss Pope  [address] [facility] [address]              Dear Driss,    We recently received a refill request from your pharmacy requesting a refill of :     A review of your chart indicates that your last office visit was on 12/11.  You are due for fasting labs, please schedule a lab only appt. We have authorized one time 30 day refill of your medication to allow time for you to schedule.     Please call the clinic at [phone], or log in to your [employer] account at [email or web address] to schedule your appointment within that time frame so there is no delay in next month's refill.    Thank you for taking an active role in your healthcare.    Sincerely,      MALU Thompson MD    If you need assistance with your [employer] log in, please call [phone].

## 2019-03-21 RX ORDER — ATORVASTATIN CALCIUM 20 MG/1
TABLET, FILM COATED ORAL
Qty: 30 TABLET | Refills: 0 | Status: SHIPPED | OUTPATIENT
Start: 2019-03-21 | End: 2019-03-29

## 2019-03-21 NOTE — TELEPHONE ENCOUNTER
atorvastatin (LIPITOR) 20 MG tablet      Last Written Prescription Date:  12/14/18  Last Fill Quantity: 90,   # refills: 0  Last Office Visit: 12/11/18  Future Office visit:

## 2019-03-28 ENCOUNTER — DOCUMENTATION ONLY (OUTPATIENT)
Dept: LAB | Facility: CLINIC | Age: 69
End: 2019-03-28

## 2019-03-29 ENCOUNTER — OFFICE VISIT (OUTPATIENT)
Dept: PEDIATRICS | Facility: CLINIC | Age: 69
End: 2019-03-29
Payer: MEDICARE

## 2019-03-29 VITALS
TEMPERATURE: 97.5 F | HEART RATE: 60 BPM | DIASTOLIC BLOOD PRESSURE: 74 MMHG | WEIGHT: 231.9 LBS | BODY MASS INDEX: 35.15 KG/M2 | HEIGHT: 68 IN | SYSTOLIC BLOOD PRESSURE: 118 MMHG | OXYGEN SATURATION: 95 %

## 2019-03-29 DIAGNOSIS — M10.9 ACUTE GOUT, UNSPECIFIED CAUSE, UNSPECIFIED SITE: ICD-10-CM

## 2019-03-29 DIAGNOSIS — E78.5 HYPERLIPIDEMIA LDL GOAL <100: ICD-10-CM

## 2019-03-29 DIAGNOSIS — M75.42 IMPINGEMENT SYNDROME OF BOTH SHOULDERS: ICD-10-CM

## 2019-03-29 DIAGNOSIS — N40.1 BENIGN NON-NODULAR PROSTATIC HYPERPLASIA WITH LOWER URINARY TRACT SYMPTOMS: ICD-10-CM

## 2019-03-29 DIAGNOSIS — I10 HYPERTENSION, GOAL BELOW 140/90: Primary | ICD-10-CM

## 2019-03-29 DIAGNOSIS — M75.41 IMPINGEMENT SYNDROME OF BOTH SHOULDERS: ICD-10-CM

## 2019-03-29 DIAGNOSIS — N18.30 CKD (CHRONIC KIDNEY DISEASE) STAGE 3, GFR 30-59 ML/MIN (H): ICD-10-CM

## 2019-03-29 LAB
ALBUMIN SERPL-MCNC: 4.2 G/DL (ref 3.4–5)
ANION GAP SERPL CALCULATED.3IONS-SCNC: 7 MMOL/L (ref 3–14)
BUN SERPL-MCNC: 29 MG/DL (ref 7–30)
CALCIUM SERPL-MCNC: 9.6 MG/DL (ref 8.5–10.1)
CHLORIDE SERPL-SCNC: 106 MMOL/L (ref 94–109)
CHOLEST SERPL-MCNC: 134 MG/DL
CO2 SERPL-SCNC: 30 MMOL/L (ref 20–32)
CREAT SERPL-MCNC: 1.2 MG/DL (ref 0.66–1.25)
ERYTHROCYTE [DISTWIDTH] IN BLOOD BY AUTOMATED COUNT: 13.2 % (ref 10–15)
GFR SERPL CREATININE-BSD FRML MDRD: 61 ML/MIN/{1.73_M2}
GLUCOSE SERPL-MCNC: 89 MG/DL (ref 70–99)
HCT VFR BLD AUTO: 42 % (ref 40–53)
HDLC SERPL-MCNC: 54 MG/DL
HGB BLD-MCNC: 13.6 G/DL (ref 13.3–17.7)
LDLC SERPL CALC-MCNC: 63 MG/DL
MCH RBC QN AUTO: 28.4 PG (ref 26.5–33)
MCHC RBC AUTO-ENTMCNC: 32.4 G/DL (ref 31.5–36.5)
MCV RBC AUTO: 88 FL (ref 78–100)
NONHDLC SERPL-MCNC: 80 MG/DL
PHOSPHATE SERPL-MCNC: 2.9 MG/DL (ref 2.5–4.5)
PLATELET # BLD AUTO: 207 10E9/L (ref 150–450)
POTASSIUM SERPL-SCNC: 4.4 MMOL/L (ref 3.4–5.3)
RBC # BLD AUTO: 4.79 10E12/L (ref 4.4–5.9)
SODIUM SERPL-SCNC: 143 MMOL/L (ref 133–144)
TRIGL SERPL-MCNC: 84 MG/DL
URATE SERPL-MCNC: 6.8 MG/DL (ref 3.5–7.2)
WBC # BLD AUTO: 4.9 10E9/L (ref 4–11)

## 2019-03-29 PROCEDURE — 84550 ASSAY OF BLOOD/URIC ACID: CPT | Performed by: INTERNAL MEDICINE

## 2019-03-29 PROCEDURE — 99214 OFFICE O/P EST MOD 30 MIN: CPT | Performed by: INTERNAL MEDICINE

## 2019-03-29 PROCEDURE — 80069 RENAL FUNCTION PANEL: CPT | Performed by: INTERNAL MEDICINE

## 2019-03-29 PROCEDURE — 36415 COLL VENOUS BLD VENIPUNCTURE: CPT | Performed by: INTERNAL MEDICINE

## 2019-03-29 PROCEDURE — 99207 ZZC NO CHARGE LOS: CPT

## 2019-03-29 PROCEDURE — 80061 LIPID PANEL: CPT | Performed by: INTERNAL MEDICINE

## 2019-03-29 PROCEDURE — 85027 COMPLETE CBC AUTOMATED: CPT | Performed by: INTERNAL MEDICINE

## 2019-03-29 RX ORDER — ATORVASTATIN CALCIUM 20 MG/1
TABLET, FILM COATED ORAL
Qty: 90 TABLET | Refills: 3 | Status: SHIPPED | OUTPATIENT
Start: 2019-03-29 | End: 2020-02-03

## 2019-03-29 RX ORDER — DUTASTERIDE 0.5 MG/1
0.5 CAPSULE, LIQUID FILLED ORAL DAILY
Qty: 90 CAPSULE | Refills: 3 | Status: SHIPPED | OUTPATIENT
Start: 2019-03-29 | End: 2020-02-07

## 2019-03-29 RX ORDER — LOSARTAN POTASSIUM 100 MG/1
100 TABLET ORAL DAILY
Qty: 90 TABLET | Refills: 3 | Status: SHIPPED | OUTPATIENT
Start: 2019-03-29 | End: 2020-02-03

## 2019-03-29 RX ORDER — TAMSULOSIN HYDROCHLORIDE 0.4 MG/1
CAPSULE ORAL
Qty: 180 CAPSULE | Refills: 3 | Status: SHIPPED | OUTPATIENT
Start: 2019-03-29 | End: 2020-01-07

## 2019-03-29 ASSESSMENT — MIFFLIN-ST. JEOR: SCORE: 1783.45

## 2019-03-29 NOTE — PROGRESS NOTES
Lab calling, patient is coming in at 9:30 for lab appointment. Would like orders to be placed for patient. Please advise.    SHARYN Olea

## 2019-03-29 NOTE — PROGRESS NOTES
SUBJECTIVE:   Driss Pope is a 68 year old male who presents to clinic today for the following health issues:    REVIEW LAB RESULTS FROM TODAY/RECHECK GOUT    Musculoskeletal problem/pain/Bilateral shoulder pain      Duration:  1 month ago    Description  Location: Both shoulders/started with general pain due to weather and now increased pain and hard to sleep at night.      Intensity:  1 out of 10 right now, at night 7-10    Accompanying signs and symptoms: radiation of pain to both biceps and tingling    History  Previous similar problem: no   Previous evaluation:  none    Precipitating or alleviating factors:  Trauma or overuse: no   Aggravating factors include: reaching up aggravates it.    Therapies tried and outcome: Circuit training 3 times a week.  Tylenol and advil if pain is severe    HPI  Patient comes in for follow-up of hypertension, chronic kidney disease stage III and dyslipidemia.  He also states that he has issue with prostate enlargement with  somewhat slow and poor urinary stream as well as frequency at night.  He gets up at least couple of times through the night.  He has been prescribed tamsulosin which he has been using but not helping much.  He is also complaining of bilateral shoulder pain and it seems worse when he reaches up and at night particularly when he lays on either left or right side.  No history of trauma.  The pain is not severe and at times aching to throbbing.  He denies chest pain, shortness of breath or dizziness.      Problem list and histories reviewed & adjusted, as indicated.  Additional history: as documented    Patient Active Problem List   Diagnosis     Hyperlipidemia LDL goal <100     Hypertriglyceridemia     CKD (chronic kidney disease) stage 3, GFR 30-59 ml/min (H)     Advanced directives, counseling/discussion     Abnormal PSA     ED (erectile dysfunction)     Benign non-nodular prostatic hyperplasia with lower urinary tract symptoms     Hypertension, goal  below 140/90     Morbid obesity (H)     Olecranon bursitis, left elbow     Acute gout     Impingement syndrome of both shoulders     Past Surgical History:   Procedure Laterality Date     ARTHROSCOPY KNEE RT/LT      Left knee     COLONOSCOPY  10/4/2011    Procedure:COLONOSCOPY; Colonoscopy, screening; Surgeon:HANNAH COMER; Location:[facility]     COLONOSCOPY  10/4/2011    Procedure:COMBINED COLONOSCOPY, REMOVE TUMOR/POLYP/LESION BY SNARE; Surgeon:HANNAH COMER; Location[facility]     COLONOSCOPY N/A 12/9/2016    Procedure: COMBINED COLONOSCOPY, SINGLE OR MULTIPLE BIOPSY/POLYPECTOMY BY BIOPSY;  Surgeon: Duane, William Charles, MD;  Location: [facility]     COLONOSCOPY WITH CO2 INSUFFLATION N/A 12/9/2016    Procedure: COLONOSCOPY WITH CO2 INSUFFLATION;  Surgeon: Duane, William Charles, MD;  Location: [facility]     COLONOSCOPY WITH CO2 INSUFFLATION N/A 3/15/2017    Procedure: COLONOSCOPY WITH CO2 INSUFFLATION;  Surgeon: Duane, William Charles, MD;  Location: [facility]     JOINT REPLACEMENT, HIP RT/LT      Joint Replacement Hip RT/LT-both replced in the last 5 years     TONSILLECTOMY         Social History     Tobacco Use     Smoking status: Never Smoker     Smokeless tobacco: Never Used   Substance Use Topics     Alcohol use: Yes     Comment: occasionally     Family History   Problem Relation Age of Onset     Alzheimer Disease Mother      Hypertension Mother      Heart Disease Father         CHF     Alzheimer Disease Paternal Grandmother      Alzheimer Disease Paternal Grandfather      Cancer Brother         esophageal cancer     Prostate Cancer Brother      Prostate Cancer Brother      Diabetes Sister      Unknown/Adopted Son         adopted     Asthma No family hx of      C.A.D. No family hx of      Cerebrovascular Disease No family hx of      Breast Cancer No family hx of      Cancer - colorectal No family hx of      Alcohol/Drug No family hx of      Allergies No family hx of      Anesthesia Reaction No family hx of       Arthritis No family hx of      Blood Disease No family hx of      Cardiovascular No family hx of      Circulatory No family hx of      Congenital Anomalies No family hx of      Connective Tissue Disorder No family hx of      Depression No family hx of      Endocrine Disease No family hx of      Genetic Disorder No family hx of      Eye Disorder No family hx of      Gastrointestinal Disease No family hx of      Genitourinary Problems No family hx of      Gynecology No family hx of      Lipids No family hx of      Musculoskeletal Disorder No family hx of      Neurologic Disorder No family hx of      Obesity No family hx of      Osteoporosis No family hx of      Psychotic Disorder No family hx of      Respiratory No family hx of      Thyroid Disease No family hx of      Family History Negative No family hx of      Hearing Loss No family hx of      Substance Abuse No family hx of      Mental Illness No family hx of      Anxiety Disorder No family hx of      Hyperlipidemia No family hx of          Current Outpatient Medications   Medication Sig Dispense Refill     aspirin 325 MG EC tablet Take 1 tablet by mouth daily. 100 tablet 3     atorvastatin (LIPITOR) 20 MG tablet TAKE 1 TABLET EVERY DAY 90 tablet 3     CIALIS 20 MG tablet Take 1/2 to 1 tablet by  mouth daily as needed,  never use with  nitroglycerin, terazosin,  or doxazosin 9 tablet 0     dutasteride (AVODART) 0.5 MG capsule Take 1 capsule (0.5 mg) by mouth daily 90 capsule 3     losartan (COZAAR) 100 MG tablet Take 1 tablet (100 mg) by mouth daily 90 tablet 3     Misc Natural Products (GLUCOSAMINE CHONDROITIN ADV PO) 2-3 tablets daily.        Multiple Vitamins-Minerals (MULTIVITAL PO) Once daily.        tamsulosin (FLOMAX) 0.4 MG capsule TAKE 2 CAPSULES EVERY  capsule 3     Krill Oil 1000 MG CAPS Take by mouth daily       Allergies   Allergen Reactions     Lisinopril Cough     Persistent cough with Lisinopril       Reviewed and updated as needed this  "visit by clinical staff  Tobacco  Allergies  Meds  Med Hx  Surg Hx  Fam Hx  Soc Hx      Reviewed and updated as needed this visit by Provider         ROS:  Constitutional, HEENT, cardiovascular, pulmonary, GI, , musculoskeletal, neuro, skin, endocrine and psych systems are negative, except as otherwise noted.    OBJECTIVE:     /74 (BP Location: Right arm, Patient Position: Sitting, Cuff Size: Adult Large)   Pulse 60   Temp 97.5  F (36.4  C) (Temporal)   Ht 1.715 m (5' 7.5\")   Wt 105.2 kg (231 lb 14.4 oz)   SpO2 95%   BMI 35.78 kg/m    Body mass index is 35.78 kg/m .  GENERAL: healthy, alert and no distress  NECK: no adenopathy, no asymmetry, masses, or scars and thyroid normal to palpation  RESP: lungs clear to auscultation - no rales, rhonchi or wheezes  CV: regular rate and rhythm, normal S1 S2, no S3 or S4, no murmur, click or rub, no peripheral edema and peripheral pulses strong  ABDOMEN: soft, nontender, no hepatosplenomegaly, no masses and bowel sounds normal  MS: Bilateral shoulder examination reveals no swelling.  Minimal tenderness of the joint space.  Abduction is actually good except after about 160-170 degrees he starts experiencing some pain and discomfort.  Similarly with internal rotation he has some pain in discomfort which restricted slightly.  external rotation was actually good.    NEURO: Normal strength and tone, mentation intact and speech normal    Diagnostic Test Results:  No results found for this or any previous visit (from the past 24 hour(s)).    ASSESSMENT/PLAN:     1.  Essential hypertension under good control with losartan.  2.  Hypercholesterolemia treated with atorvastatin.  Cholesterol today 134, HDL 54 LDL 63 which is excellent control.  3.  Chronic kidney disease stage III under excellent control and in fact his creatinine today is within normal range at 1.1 with GFR at 61.  Recommend recheck in 6 months.  4.  History of an episode of gout.  His uric acid level " is in the upper range of normal at 6.8 today.  5.  Bilateral shoulder pain suggestive of impingement syndrome.  Demonstrated exercises he could do at home.  If they do not work out other options such as cortisone injection discussed.  He will get back to me.  Also orthopedic referral could be considered if the symptoms do not improve.  6.  Benign prostatic hypertrophy with lower urinary obstructive symptoms.  Patient already on tamsulosin.  After further discussion he agreed to try Avodart 0.5 mg once a day side effects discussed.    I will have patient return for follow-up with renal panel in 6 months.    Adam Soliz MD  [facility], [place]

## 2019-04-08 NOTE — PROGRESS NOTES
SUBJECTIVE:                                                    Driss Pope is a 68 year old male who presents to clinic today for the following health issues:      RIGHT AND LEFT SHOULDER INJECTION    HPI  Patient was previously seen for bilateral shoulder pain.  Was felt to have bilateral shoulder impingement syndrome perhaps some underlying arthritis.  The therapy that I recommended has helped a little bit but not much.  He comes in requesting cortisone injection in both shoulders.    Problem list and histories reviewed & adjusted, as indicated.  Additional history: as documented    Patient Active Problem List   Diagnosis     Hyperlipidemia LDL goal <100     Hypertriglyceridemia     CKD (chronic kidney disease) stage 3, GFR 30-59 ml/min (H)     Advanced directives, counseling/discussion     Abnormal PSA     ED (erectile dysfunction)     Benign non-nodular prostatic hyperplasia with lower urinary tract symptoms     Hypertension, goal below 140/90     Morbid obesity (H)     Olecranon bursitis, left elbow     Acute gout     Impingement syndrome of both shoulders     Past Surgical History:   Procedure Laterality Date     ARTHROSCOPY KNEE RT/LT      Left knee     COLONOSCOPY  10/4/2011    Procedure:COLONOSCOPY; Colonoscopy, screening; Surgeon:HANNAH COMER; Location:[facility]     COLONOSCOPY  10/4/2011    Procedure:COMBINED COLONOSCOPY, REMOVE TUMOR/POLYP/LESION BY SNARE; Surgeon:HANNAH COMER; Location[facility]     COLONOSCOPY N/A 12/9/2016    Procedure: COMBINED COLONOSCOPY, SINGLE OR MULTIPLE BIOPSY/POLYPECTOMY BY BIOPSY;  Surgeon: Duane, William Charles, MD;  Location: [facility]     COLONOSCOPY WITH CO2 INSUFFLATION N/A 12/9/2016    Procedure: COLONOSCOPY WITH CO2 INSUFFLATION;  Surgeon: Duane, William Charles, MD;  Location: [facility]     COLONOSCOPY WITH CO2 INSUFFLATION N/A 3/15/2017    Procedure: COLONOSCOPY WITH CO2 INSUFFLATION;  Surgeon: Duane, William Charles, MD;  Location: [facility]     JOINT REPLACEMENT,  HIP RT/LT      Joint Replacement Hip RT/LT-both replced in the last 5 years     TONSILLECTOMY         Social History     Tobacco Use     Smoking status: Never Smoker     Smokeless tobacco: Never Used   Substance Use Topics     Alcohol use: Yes     Comment: occasionally     Family History   Problem Relation Age of Onset     Alzheimer Disease Mother      Hypertension Mother      Heart Disease Father         CHF     Alzheimer Disease Paternal Grandmother      Alzheimer Disease Paternal Grandfather      Cancer Brother         esophageal cancer     Prostate Cancer Brother      Prostate Cancer Brother      Diabetes Sister      Unknown/Adopted Son         adopted     Asthma No family hx of      C.A.D. No family hx of      Cerebrovascular Disease No family hx of      Breast Cancer No family hx of      Cancer - colorectal No family hx of      Alcohol/Drug No family hx of      Allergies No family hx of      Anesthesia Reaction No family hx of      Arthritis No family hx of      Blood Disease No family hx of      Cardiovascular No family hx of      Circulatory No family hx of      Congenital Anomalies No family hx of      Connective Tissue Disorder No family hx of      Depression No family hx of      Endocrine Disease No family hx of      Genetic Disorder No family hx of      Eye Disorder No family hx of      Gastrointestinal Disease No family hx of      Genitourinary Problems No family hx of      Gynecology No family hx of      Lipids No family hx of      Musculoskeletal Disorder No family hx of      Neurologic Disorder No family hx of      Obesity No family hx of      Osteoporosis No family hx of      Psychotic Disorder No family hx of      Respiratory No family hx of      Thyroid Disease No family hx of      Family History Negative No family hx of      Hearing Loss No family hx of      Substance Abuse No family hx of      Mental Illness No family hx of      Anxiety Disorder No family hx of      Hyperlipidemia No family hx  "of          Current Outpatient Medications   Medication Sig Dispense Refill     aspirin 325 MG EC tablet Take 1 tablet by mouth daily. 100 tablet 3     atorvastatin (LIPITOR) 20 MG tablet TAKE 1 TABLET EVERY DAY 90 tablet 3     CIALIS 20 MG tablet Take 1/2 to 1 tablet by  mouth daily as needed,  never use with  nitroglycerin, terazosin,  or doxazosin 9 tablet 0     dutasteride (AVODART) 0.5 MG capsule Take 1 capsule (0.5 mg) by mouth daily 90 capsule 3     Krill Oil 1000 MG CAPS Take by mouth daily       losartan (COZAAR) 100 MG tablet Take 1 tablet (100 mg) by mouth daily 90 tablet 3     Misc Natural Products (GLUCOSAMINE CHONDROITIN ADV PO) 2-3 tablets daily.        Multiple Vitamins-Minerals (MULTIVITAL PO) Once daily.        tamsulosin (FLOMAX) 0.4 MG capsule TAKE 2 CAPSULES EVERY  capsule 3     Allergies   Allergen Reactions     Lisinopril Cough     Persistent cough with Lisinopril       ROS:  Constitutional, HEENT, cardiovascular, pulmonary, gi and gu systems are negative, except as otherwise noted.    OBJECTIVE:     /79 (BP Location: Right arm, Patient Position: Chair, Cuff Size: Adult Regular)   Pulse 68   Temp 97.2  F (36.2  C) (Temporal)   Ht 1.715 m (5' 7.5\")   Wt 103.7 kg (228 lb 9.6 oz)   SpO2 96%   BMI 35.28 kg/m    Body mass index is 35.28 kg/m .  GENERAL: healthy, alert and no distress  MS: Both shoulder show slight around the AC joint.  Abduction of both shoulders is limited to about 110 120 degrees.  Internal and external rotators limited by pain and discomfort in both shoulders.  No evidence of effusion.    Diagnostic Test Results:  none     ASSESSMENT/PLAN:     1.  Left Shoulder Impingement Syndrome with Possible Underlying Arthritis.  2.  Right shoulder impingement syndrome with possible underlying arthritis    Patient was informed of the risk, benefit and alternative treatments were discussed.  He was given opportunity to ask questions and all questions were answered.  He " consented to treatment plan.    All aseptic precautions were taken.  Right shoulder was injected first.  A 5 mL of 1% Xylocaine used as local anesthesia.  60 mg of Kenalog was injected using a subacromial approach with 25-gauge needle.  In a similar fashion the left shoulder was injected with 5 mL 1% Xylocaine and 60 mg of Kenalog.  Patient taught procedure well.    Adam Soliz MD  [facility]

## 2019-04-09 ENCOUNTER — OFFICE VISIT (OUTPATIENT)
Dept: PEDIATRICS | Facility: CLINIC | Age: 69
End: 2019-04-09
Payer: MEDICARE

## 2019-04-09 VITALS
SYSTOLIC BLOOD PRESSURE: 144 MMHG | WEIGHT: 228.6 LBS | TEMPERATURE: 97.2 F | HEIGHT: 68 IN | OXYGEN SATURATION: 96 % | DIASTOLIC BLOOD PRESSURE: 79 MMHG | BODY MASS INDEX: 34.65 KG/M2 | HEART RATE: 68 BPM

## 2019-04-09 DIAGNOSIS — M75.42 IMPINGEMENT SYNDROME OF SHOULDER REGION, LEFT: Primary | ICD-10-CM

## 2019-04-09 DIAGNOSIS — M75.41 IMPINGEMENT SYNDROME OF SHOULDER REGION, RIGHT: ICD-10-CM

## 2019-04-09 PROCEDURE — 20610 DRAIN/INJ JOINT/BURSA W/O US: CPT | Mod: 50 | Performed by: INTERNAL MEDICINE

## 2019-04-09 RX ORDER — TRIAMCINOLONE ACETONIDE 40 MG/ML
60 INJECTION, SUSPENSION INTRA-ARTICULAR; INTRAMUSCULAR ONCE
Status: COMPLETED | OUTPATIENT
Start: 2019-04-09 | End: 2019-04-09

## 2019-04-09 RX ADMIN — TRIAMCINOLONE ACETONIDE 60 MG: 40 INJECTION, SUSPENSION INTRA-ARTICULAR; INTRAMUSCULAR at 13:03

## 2019-04-09 ASSESSMENT — MIFFLIN-ST. JEOR: SCORE: 1768.48

## 2019-04-09 NOTE — NURSING NOTE
The following medication was given:     MEDICATION: 1% lidocaine  ROUTE: IM  LOT #: [id number]  NDC: 5416-7570-23  :  [employer]  EXPIRATION DATE:  8/20    Berenice Ramirez MA

## 2019-05-30 ENCOUNTER — ANCILLARY PROCEDURE (OUTPATIENT)
Dept: GENERAL RADIOLOGY | Facility: CLINIC | Age: 69
End: 2019-05-30
Attending: INTERNAL MEDICINE
Payer: MEDICARE

## 2019-05-30 ENCOUNTER — OFFICE VISIT (OUTPATIENT)
Dept: PEDIATRICS | Facility: CLINIC | Age: 69
End: 2019-05-30
Payer: MEDICARE

## 2019-05-30 VITALS
WEIGHT: 209.7 LBS | RESPIRATION RATE: 18 BRPM | SYSTOLIC BLOOD PRESSURE: 125 MMHG | DIASTOLIC BLOOD PRESSURE: 76 MMHG | BODY MASS INDEX: 32.36 KG/M2 | TEMPERATURE: 98.7 F | HEART RATE: 90 BPM | OXYGEN SATURATION: 94 %

## 2019-05-30 DIAGNOSIS — I10 HYPERTENSION, GOAL BELOW 140/90: ICD-10-CM

## 2019-05-30 DIAGNOSIS — J18.9 PNEUMONIA OF LEFT LOWER LOBE DUE TO INFECTIOUS ORGANISM: ICD-10-CM

## 2019-05-30 DIAGNOSIS — R05.8 COUGH WITH EXPECTORATION: Primary | ICD-10-CM

## 2019-05-30 LAB
BASOPHILS # BLD AUTO: 0 10E9/L (ref 0–0.2)
BASOPHILS NFR BLD AUTO: 0.5 %
DIFFERENTIAL METHOD BLD: NORMAL
EOSINOPHIL # BLD AUTO: 0.1 10E9/L (ref 0–0.7)
EOSINOPHIL NFR BLD AUTO: 1.6 %
ERYTHROCYTE [DISTWIDTH] IN BLOOD BY AUTOMATED COUNT: 13.4 % (ref 10–15)
HCT VFR BLD AUTO: 40.4 % (ref 40–53)
HGB BLD-MCNC: 13.3 G/DL (ref 13.3–17.7)
IMM GRANULOCYTES # BLD: 0 10E9/L (ref 0–0.4)
IMM GRANULOCYTES NFR BLD: 0.4 %
LYMPHOCYTES # BLD AUTO: 1.5 10E9/L (ref 0.8–5.3)
LYMPHOCYTES NFR BLD AUTO: 19.3 %
MCH RBC QN AUTO: 28.6 PG (ref 26.5–33)
MCHC RBC AUTO-ENTMCNC: 32.9 G/DL (ref 31.5–36.5)
MCV RBC AUTO: 87 FL (ref 78–100)
MONOCYTES # BLD AUTO: 0.7 10E9/L (ref 0–1.3)
MONOCYTES NFR BLD AUTO: 8.5 %
NEUTROPHILS # BLD AUTO: 5.5 10E9/L (ref 1.6–8.3)
NEUTROPHILS NFR BLD AUTO: 69.7 %
PLATELET # BLD AUTO: 247 10E9/L (ref 150–450)
RBC # BLD AUTO: 4.65 10E12/L (ref 4.4–5.9)
WBC # BLD AUTO: 7.9 10E9/L (ref 4–11)

## 2019-05-30 PROCEDURE — 85025 COMPLETE CBC W/AUTO DIFF WBC: CPT | Performed by: INTERNAL MEDICINE

## 2019-05-30 PROCEDURE — 99214 OFFICE O/P EST MOD 30 MIN: CPT | Performed by: INTERNAL MEDICINE

## 2019-05-30 PROCEDURE — 71046 X-RAY EXAM CHEST 2 VIEWS: CPT | Performed by: RADIOLOGY

## 2019-05-30 PROCEDURE — 36415 COLL VENOUS BLD VENIPUNCTURE: CPT | Performed by: INTERNAL MEDICINE

## 2019-05-30 RX ORDER — LEVOFLOXACIN 500 MG/1
500 TABLET, FILM COATED ORAL DAILY
Qty: 7 TABLET | Refills: 0 | Status: SHIPPED | OUTPATIENT
Start: 2019-05-30 | End: 2019-06-10

## 2019-05-30 ASSESSMENT — PAIN SCALES - GENERAL: PAINLEVEL: NO PAIN (0)

## 2019-05-30 NOTE — PROGRESS NOTES
Subjective     Driss Pope is a 69 year old male who presents to clinic today for the following health issues:    HPI   Acute Illness   Acute illness concerns: Cough and cold symptoms for the last three weeks  Onset: Three weeks ago    Fever: no     Chills/Sweats: YES, sweats but pt states that is not uncommon for him    Headache (location?): YES, Temporal area but is gone now    Sinus Pressure:YES, previously    Conjunctivitis:  no    Ear Pain: no    Rhinorrhea: YES    Congestion: YES, in the chest area    Sore Throat: YES, previously and now is probably due to coughing constantly     Cough: YES-productive of yellow sputum    Wheeze: YES    Decreased Appetite: no     Nausea: no     Vomiting: no     Diarrhea:  no     Dysuria/Freq.: YES, frequently urination but pt is taking medicine for    Fatigue/Achiness: YES    Sick/Strep Exposure: no      Therapies Tried and outcome: Nyquil and Nyquil      HPI.  Patient comes in with 3-week history of illness.  He started out with a sore throat and head congestion.  Then a descending in his chest and he has been coughing with some expectoration.  Is worse at night.  He wheezes.  He is not short of breath.  He is feeling quite fatigued and weak.  Even lightheaded with exertion.  But no fever or chills.    Patient Active Problem List   Diagnosis     Hyperlipidemia LDL goal <100     Hypertriglyceridemia     CKD (chronic kidney disease) stage 3, GFR 30-59 ml/min (H)     Advanced directives, counseling/discussion     Abnormal PSA     ED (erectile dysfunction)     Benign non-nodular prostatic hyperplasia with lower urinary tract symptoms     Hypertension, goal below 140/90     Morbid obesity (H)     Olecranon bursitis, left elbow     Acute gout     Impingement syndrome of both shoulders     Past Surgical History:   Procedure Laterality Date     ARTHROSCOPY KNEE RT/LT      Left knee     COLONOSCOPY  10/4/2011    Procedure:COLONOSCOPY; Colonoscopy, screening; Surgeon:NAHOMI  HANNAH; [facility]     COLONOSCOPY  10/4/2011    Procedure:COMBINED COLONOSCOPY, REMOVE TUMOR/POLYP/LESION BY SNARE; Surgeon:HANNAH COMER; [facility]     COLONOSCOPY N/A 12/9/2016    Procedure: COMBINED COLONOSCOPY, SINGLE OR MULTIPLE BIOPSY/POLYPECTOMY BY BIOPSY;  Surgeon: Duane, William Charles, MD;  Location: [facility]     COLONOSCOPY WITH CO2 INSUFFLATION N/A 12/9/2016    Procedure: COLONOSCOPY WITH CO2 INSUFFLATION;  Surgeon: Duane, William Charles, MD;  Location: [facility]     COLONOSCOPY WITH CO2 INSUFFLATION N/A 3/15/2017    Procedure: COLONOSCOPY WITH CO2 INSUFFLATION;  Surgeon: Duane, William Charles, MD;  Location: [facility]     JOINT REPLACEMENT, HIP RT/LT      Joint Replacement Hip RT/LT-both replced in the last 5 years     TONSILLECTOMY         Social History     Tobacco Use     Smoking status: Never Smoker     Smokeless tobacco: Never Used   Substance Use Topics     Alcohol use: Yes     Comment: occasionally     Family History   Problem Relation Age of Onset     Alzheimer Disease Mother      Hypertension Mother      Heart Disease Father         CHF     Alzheimer Disease Paternal Grandmother      Alzheimer Disease Paternal Grandfather      Cancer Brother         esophageal cancer     Prostate Cancer Brother      Prostate Cancer Brother      Diabetes Sister      Unknown/Adopted Son         adopted     Asthma No family hx of      C.A.D. No family hx of      Cerebrovascular Disease No family hx of      Breast Cancer No family hx of      Cancer - colorectal No family hx of      Alcohol/Drug No family hx of      Allergies No family hx of      Anesthesia Reaction No family hx of      Arthritis No family hx of      Blood Disease No family hx of      Cardiovascular No family hx of      Circulatory No family hx of      Congenital Anomalies No family hx of      Connective Tissue Disorder No family hx of      Depression No family hx of      Endocrine Disease No family hx of      Genetic Disorder No family hx  of      Eye Disorder No family hx of      Gastrointestinal Disease No family hx of      Genitourinary Problems No family hx of      Gynecology No family hx of      Lipids No family hx of      Musculoskeletal Disorder No family hx of      Neurologic Disorder No family hx of      Obesity No family hx of      Osteoporosis No family hx of      Psychotic Disorder No family hx of      Respiratory No family hx of      Thyroid Disease No family hx of      Family History Negative No family hx of      Hearing Loss No family hx of      Substance Abuse No family hx of      Mental Illness No family hx of      Anxiety Disorder No family hx of      Hyperlipidemia No family hx of          Current Outpatient Medications   Medication Sig Dispense Refill     aspirin 325 MG EC tablet Take 1 tablet by mouth daily. 100 tablet 3     atorvastatin (LIPITOR) 20 MG tablet TAKE 1 TABLET EVERY DAY 90 tablet 3     CIALIS 20 MG tablet Take 1/2 to 1 tablet by  mouth daily as needed,  never use with  nitroglycerin, terazosin,  or doxazosin 9 tablet 0     dutasteride (AVODART) 0.5 MG capsule Take 1 capsule (0.5 mg) by mouth daily 90 capsule 3     Krill Oil 1000 MG CAPS Take by mouth daily       levofloxacin (LEVAQUIN) 500 MG tablet Take 1 tablet (500 mg) by mouth daily for 7 days 7 tablet 0     losartan (COZAAR) 100 MG tablet Take 1 tablet (100 mg) by mouth daily 90 tablet 3     Misc Natural Products (GLUCOSAMINE CHONDROITIN ADV PO) 2-3 tablets daily.        Multiple Vitamins-Minerals (MULTIVITAL PO) Once daily.        tamsulosin (FLOMAX) 0.4 MG capsule TAKE 2 CAPSULES EVERY  capsule 3     Allergies   Allergen Reactions     Lisinopril Cough     Persistent cough with Lisinopril         Reviewed and updated as needed this visit by Provider         Review of Systems   ROS COMP: Constitutional, HEENT, cardiovascular, pulmonary, GI, , musculoskeletal, neuro, skin, endocrine and psych systems are negative, except as otherwise noted.       Objective    /76 (BP Location: Right arm, Patient Position: Sitting, Cuff Size: Adult Large)   Pulse 90   Temp 98.7  F (37.1  C) (Oral)   Resp 18   Wt 95.1 kg (209 lb 11.2 oz)   SpO2 94%   BMI 32.36 kg/m    Body mass index is 32.36 kg/m .  Physical Exam   GENERAL: healthy, alert and no distress  NECK: no adenopathy, no asymmetry, masses, or scars and thyroid normal to palpation  RESP: no wheezes and rales L lower posterior  CV: regular rate and rhythm, normal S1 S2, no S3 or S4, no murmur, click or rub, no peripheral edema and peripheral pulses strong  ABDOMEN: soft, nontender, no hepatosplenomegaly, no masses and bowel sounds normal  MS: no gross musculoskeletal defects noted, no edema    Diagnostic Test Results:  Labs reviewed in Epic  Results for orders placed or performed in visit on 05/30/19 (from the past 24 hour(s))   CBC with platelets differential   Result Value Ref Range    WBC 7.9 4.0 - 11.0 10e9/L    RBC Count 4.65 4.4 - 5.9 10e12/L    Hemoglobin 13.3 13.3 - 17.7 g/dL    Hematocrit 40.4 40.0 - 53.0 %    MCV 87 78 - 100 fl    MCH 28.6 26.5 - 33.0 pg    MCHC 32.9 31.5 - 36.5 g/dL    RDW 13.4 10.0 - 15.0 %    Platelet Count 247 150 - 450 10e9/L    Diff Method Automated Method     % Neutrophils 69.7 %    % Lymphocytes 19.3 %    % Monocytes 8.5 %    % Eosinophils 1.6 %    % Basophils 0.5 %    % Immature Granulocytes 0.4 %    Absolute Neutrophil 5.5 1.6 - 8.3 10e9/L    Absolute Lymphocytes 1.5 0.8 - 5.3 10e9/L    Absolute Monocytes 0.7 0.0 - 1.3 10e9/L    Absolute Eosinophils 0.1 0.0 - 0.7 10e9/L    Absolute Basophils 0.0 0.0 - 0.2 10e9/L    Abs Immature Granulocytes 0.0 0 - 0.4 10e9/L   XR Chest 2 Views    Narrative    XR CHEST 2 VW  5/30/2019 8:43 AM      HISTORY: Cough with expectoration    COMPARISON: 10/16/2017    FINDINGS:   PA and lateral views of the chest.    Trachea is midline. Cardiomediastinal silhouette is within normal  limits. The pulmonary vasculature is distinct. No pleural  "effusion.  Opacities of the inferior left lower lobe best visualized on lateral  view. No pneumothorax.    Healed left rib fracture. Degenerative changes of the spine.  Midthoracic compression fracture. Visualized portions of the abdomen  and osseous structures are otherwise unremarkable.      Impression    IMPRESSION: Opacities of the inferior left lower lobe, infection  versus atelectasis.    I have personally reviewed the examination and initial interpretation  and I agree with the findings.    RHONDA GALINDO, DO           Assessment & Plan     1.  Left lower lobe pneumonia.  White count was normal but had a left shift.  Decided to treat him with Levaquin 500 mg daily for 7 days.  2.  Hypertension under good control.  3.  Chronic kidney disease stage III stable.     BMI:   Estimated body mass index is 32.36 kg/m  as calculated from the following:    Height as of 4/9/19: 1.715 m (5' 7.5\").    Weight as of this encounter: 95.1 kg (209 lb 11.2 oz).               Return if symptoms worsen or fail to improve.    Adam Soliz MD  [facility]        "

## 2019-06-10 ENCOUNTER — OFFICE VISIT (OUTPATIENT)
Dept: PEDIATRICS | Facility: CLINIC | Age: 69
End: 2019-06-10
Payer: MEDICARE

## 2019-06-10 VITALS
SYSTOLIC BLOOD PRESSURE: 120 MMHG | HEART RATE: 65 BPM | WEIGHT: 212 LBS | BODY MASS INDEX: 32.13 KG/M2 | TEMPERATURE: 97.5 F | OXYGEN SATURATION: 96 % | DIASTOLIC BLOOD PRESSURE: 71 MMHG | HEIGHT: 68 IN

## 2019-06-10 DIAGNOSIS — N18.30 CKD (CHRONIC KIDNEY DISEASE) STAGE 3, GFR 30-59 ML/MIN (H): ICD-10-CM

## 2019-06-10 DIAGNOSIS — M10.9 ACUTE GOUTY ARTHRITIS: Primary | ICD-10-CM

## 2019-06-10 PROCEDURE — 99213 OFFICE O/P EST LOW 20 MIN: CPT | Performed by: INTERNAL MEDICINE

## 2019-06-10 RX ORDER — PREDNISONE 20 MG/1
TABLET ORAL
Qty: 12 TABLET | Refills: 0 | Status: SHIPPED | OUTPATIENT
Start: 2019-06-10 | End: 2019-09-03

## 2019-06-10 ASSESSMENT — MIFFLIN-ST. JEOR: SCORE: 1688.19

## 2019-06-10 NOTE — PROGRESS NOTES
Leftt foot pain    Driss Pope is a 69 year old male who presents to clinic today for the following health issues:    HPI     Left foot pain.  Patient states on Weds last week he started having left heel pain, then on Saturday woke up with pain, redness and swelling left big toe area and the top of his left foot hurts.  Patient has a history of gout.    Patient has history of recurrent gout.  Approximately is once a year.  Last episode was in December 2018.  He has mild chronic kidney disease.    Patient Active Problem List   Diagnosis     Hyperlipidemia LDL goal <100     Hypertriglyceridemia     CKD (chronic kidney disease) stage 3, GFR 30-59 ml/min (H)     Advanced directives, counseling/discussion     Abnormal PSA     ED (erectile dysfunction)     Benign non-nodular prostatic hyperplasia with lower urinary tract symptoms     Hypertension, goal below 140/90     Morbid obesity (H)     Olecranon bursitis, left elbow     Acute gout     Impingement syndrome of both shoulders     Past Surgical History:   Procedure Laterality Date     ARTHROSCOPY KNEE RT/LT      Left knee     COLONOSCOPY  10/4/2011    Procedure:COLONOSCOPY; Colonoscopy, screening; Surgeon:HANNAH COMER; [facility]     COLONOSCOPY  10/4/2011    Procedure:COMBINED COLONOSCOPY, REMOVE TUMOR/POLYP/LESION BY SNARE; Surgeon:HANNAH COMER; [facility]     COLONOSCOPY N/A 12/9/2016    Procedure: COMBINED COLONOSCOPY, SINGLE OR MULTIPLE BIOPSY/POLYPECTOMY BY BIOPSY;  Surgeon: Duane, William Charles, MD;  Location: [facility]     COLONOSCOPY WITH CO2 INSUFFLATION N/A 12/9/2016    Procedure: COLONOSCOPY WITH CO2 INSUFFLATION;  Surgeon: Duane, William Charles, MD;  Location: [facility]     COLONOSCOPY WITH CO2 INSUFFLATION N/A 3/15/2017    Procedure: COLONOSCOPY WITH CO2 INSUFFLATION;  Surgeon: Duane, William Charles, MD;  Location: [facility]     JOINT REPLACEMENT, HIP RT/LT      Joint Replacement Hip RT/LT-both replced in the last 5 years     TONSILLECTOMY          Social History     Tobacco Use     Smoking status: Never Smoker     Smokeless tobacco: Never Used   Substance Use Topics     Alcohol use: Yes     Comment: occasionally     Family History   Problem Relation Age of Onset     Alzheimer Disease Mother      Hypertension Mother      Heart Disease Father         CHF     Alzheimer Disease Paternal Grandmother      Alzheimer Disease Paternal Grandfather      Cancer Brother         esophageal cancer     Prostate Cancer Brother      Prostate Cancer Brother      Diabetes Sister      Unknown/Adopted Son         adopted     Asthma No family hx of      C.A.D. No family hx of      Cerebrovascular Disease No family hx of      Breast Cancer No family hx of      Cancer - colorectal No family hx of      Alcohol/Drug No family hx of      Allergies No family hx of      Anesthesia Reaction No family hx of      Arthritis No family hx of      Blood Disease No family hx of      Cardiovascular No family hx of      Circulatory No family hx of      Congenital Anomalies No family hx of      Connective Tissue Disorder No family hx of      Depression No family hx of      Endocrine Disease No family hx of      Genetic Disorder No family hx of      Eye Disorder No family hx of      Gastrointestinal Disease No family hx of      Genitourinary Problems No family hx of      Gynecology No family hx of      Lipids No family hx of      Musculoskeletal Disorder No family hx of      Neurologic Disorder No family hx of      Obesity No family hx of      Osteoporosis No family hx of      Psychotic Disorder No family hx of      Respiratory No family hx of      Thyroid Disease No family hx of      Family History Negative No family hx of      Hearing Loss No family hx of      Substance Abuse No family hx of      Mental Illness No family hx of      Anxiety Disorder No family hx of      Hyperlipidemia No family hx of          Current Outpatient Medications   Medication Sig Dispense Refill     aspirin 325 MG  "EC tablet Take 1 tablet by mouth daily. 100 tablet 3     atorvastatin (LIPITOR) 20 MG tablet TAKE 1 TABLET EVERY DAY 90 tablet 3     CIALIS 20 MG tablet Take 1/2 to 1 tablet by  mouth daily as needed,  never use with  nitroglycerin, terazosin,  or doxazosin 9 tablet 0     dutasteride (AVODART) 0.5 MG capsule Take 1 capsule (0.5 mg) by mouth daily 90 capsule 3     Krill Oil 1000 MG CAPS Take by mouth daily       losartan (COZAAR) 100 MG tablet Take 1 tablet (100 mg) by mouth daily 90 tablet 3     Misc Natural Products (GLUCOSAMINE CHONDROITIN ADV PO) 2-3 tablets daily.        Multiple Vitamins-Minerals (MULTIVITAL PO) Once daily.        predniSONE (DELTASONE) 20 MG tablet 3 tablets daily for 2 days than two tablets for 2 days than one tablet for 2 days 12 tablet 0     tamsulosin (FLOMAX) 0.4 MG capsule TAKE 2 CAPSULES EVERY  capsule 3     Allergies   Allergen Reactions     Lisinopril Cough     Persistent cough with Lisinopril         Reviewed and updated as needed this visit by Provider         Review of Systems   ROS COMP: Constitutional, HEENT, cardiovascular, pulmonary, gi and gu systems are negative, except as otherwise noted.      Objective    /71   Pulse 65   Temp 97.5  F (36.4  C) (Temporal)   Ht 1.715 m (5' 7.5\")   Wt 96.2 kg (212 lb)   SpO2 96%   BMI 32.71 kg/m    Body mass index is 32.71 kg/m .  Physical Exam   GENERAL: healthy, alert and no distress  MS: swelling, redness and tenderness left great toe MTP joint.    Diagnostic Test Results:  Labs reviewed in Epic  Results for orders placed or performed in visit on 05/30/19   XR Chest 2 Views    Narrative    XR CHEST 2 VW  5/30/2019 8:43 AM      HISTORY: Cough with expectoration    COMPARISON: 10/16/2017    FINDINGS:   PA and lateral views of the chest.    Trachea is midline. Cardiomediastinal silhouette is within normal  limits. The pulmonary vasculature is distinct. No pleural effusion.  Opacities of the inferior left lower lobe best " "visualized on lateral  view. No pneumothorax.    Healed left rib fracture. Degenerative changes of the spine.  Midthoracic compression fracture. Visualized portions of the abdomen  and osseous structures are otherwise unremarkable.      Impression    IMPRESSION: Opacities of the inferior left lower lobe, infection  versus atelectasis.    I have personally reviewed the examination and initial interpretation  and I agree with the findings.    RHONDA GALINDO, DO   CBC with platelets differential   Result Value Ref Range    WBC 7.9 4.0 - 11.0 10e9/L    RBC Count 4.65 4.4 - 5.9 10e12/L    Hemoglobin 13.3 13.3 - 17.7 g/dL    Hematocrit 40.4 40.0 - 53.0 %    MCV 87 78 - 100 fl    MCH 28.6 26.5 - 33.0 pg    MCHC 32.9 31.5 - 36.5 g/dL    RDW 13.4 10.0 - 15.0 %    Platelet Count 247 150 - 450 10e9/L    Diff Method Automated Method     % Neutrophils 69.7 %    % Lymphocytes 19.3 %    % Monocytes 8.5 %    % Eosinophils 1.6 %    % Basophils 0.5 %    % Immature Granulocytes 0.4 %    Absolute Neutrophil 5.5 1.6 - 8.3 10e9/L    Absolute Lymphocytes 1.5 0.8 - 5.3 10e9/L    Absolute Monocytes 0.7 0.0 - 1.3 10e9/L    Absolute Eosinophils 0.1 0.0 - 0.7 10e9/L    Absolute Basophils 0.0 0.0 - 0.2 10e9/L    Abs Immature Granulocytes 0.0 0 - 0.4 10e9/L           Assessment & Plan     1.  Acute gout involving the left great toe MTP joint.  Discussed treatment option including starting low-dose allopurinol in view of recurrent gout episodes.  Side effects of allopurinol such as Anderson-Tong syndrome discussed.  Patient decided not to consider allopurinol at this time.  Since he has episode usually about once a year he would rather wait.  For the acute episode I put him on prednisone 60 mg to be tapered off in 6 days.  2.  Chronic kidney disease stage III.  Is been stable.       BMI:   Estimated body mass index is 32.71 kg/m  as calculated from the following:    Height as of this encounter: 1.715 m (5' 7.5\").    Weight as of this encounter: " 96.2 kg (212 lb).               No follow-ups on file.    Adam Soliz MD  [facility]

## 2019-07-30 ENCOUNTER — OFFICE VISIT (OUTPATIENT)
Dept: PEDIATRICS | Facility: CLINIC | Age: 69
End: 2019-07-30
Payer: MEDICARE

## 2019-07-30 VITALS
DIASTOLIC BLOOD PRESSURE: 82 MMHG | WEIGHT: 213.6 LBS | HEIGHT: 68 IN | BODY MASS INDEX: 32.37 KG/M2 | SYSTOLIC BLOOD PRESSURE: 138 MMHG | TEMPERATURE: 97.1 F | HEART RATE: 58 BPM | OXYGEN SATURATION: 96 %

## 2019-07-30 DIAGNOSIS — N40.1 BENIGN NON-NODULAR PROSTATIC HYPERPLASIA WITH LOWER URINARY TRACT SYMPTOMS: ICD-10-CM

## 2019-07-30 DIAGNOSIS — M75.42 IMPINGEMENT SYNDROME OF SHOULDER REGION, LEFT: Primary | ICD-10-CM

## 2019-07-30 PROCEDURE — 99214 OFFICE O/P EST MOD 30 MIN: CPT | Mod: 25 | Performed by: INTERNAL MEDICINE

## 2019-07-30 PROCEDURE — 20610 DRAIN/INJ JOINT/BURSA W/O US: CPT | Performed by: INTERNAL MEDICINE

## 2019-07-30 RX ORDER — TRIAMCINOLONE ACETONIDE 40 MG/ML
80 INJECTION, SUSPENSION INTRA-ARTICULAR; INTRAMUSCULAR ONCE
Status: COMPLETED | OUTPATIENT
Start: 2019-07-30 | End: 2019-07-30

## 2019-07-30 RX ADMIN — TRIAMCINOLONE ACETONIDE 80 MG: 40 INJECTION, SUSPENSION INTRA-ARTICULAR; INTRAMUSCULAR at 11:10

## 2019-07-30 ASSESSMENT — MIFFLIN-ST. JEOR: SCORE: 1695.44

## 2019-07-30 NOTE — PROGRESS NOTES
Recheck left shoulder pain    Driss Pope is a 69 year old male who presents to clinic today for the following health issues:    HPI   Recheck left shoulder pain.  Patient states he had a right and left shoulder injection on 04/09/19.  His right shoulder is ok for now.  He does ballroom dancing.  Patient states he drops things more now due to not being able to  things like he used to.    Patient previously diagnosed with bilateral shoulder impingement syndrome.  He had cortisone injection in April, both shoulders that worked well until now.  Recently starting to have pain left shoulder particularly when he lays down at night.  Still able to play golf.  He like a cortisone injection left shoulder at this time.    He has urinary obstructive symptoms related to BPH and has been treated with Avodart and tamsulosin.  Recently his symptoms have increased.  He cannot see a whole movie without getting up 3 times to urinate.  At night, depending on how much he drank he at times gets up up to 4 times.  Has quite a bit of frequency during daytime.    Patient Active Problem List   Diagnosis     Hyperlipidemia LDL goal <100     Hypertriglyceridemia     CKD (chronic kidney disease) stage 3, GFR 30-59 ml/min (H)     Advanced directives, counseling/discussion     Abnormal PSA     ED (erectile dysfunction)     Benign non-nodular prostatic hyperplasia with lower urinary tract symptoms     Hypertension, goal below 140/90     Morbid obesity (H)     Olecranon bursitis, left elbow     Acute gout     Impingement syndrome of both shoulders     Past Surgical History:   Procedure Laterality Date     ARTHROSCOPY KNEE RT/LT      Left knee     COLONOSCOPY  10/4/2011    Procedure:COLONOSCOPY; Colonoscopy, screening; Surgeon:HANNAH COMER; Location:[facility]     COLONOSCOPY  10/4/2011    Procedure:COMBINED COLONOSCOPY, REMOVE TUMOR/POLYP/LESION BY SNARE; Surgeon:HANNAH COMER; Location:[facility]     COLONOSCOPY N/A 12/9/2016     Procedure: COMBINED COLONOSCOPY, SINGLE OR MULTIPLE BIOPSY/POLYPECTOMY BY BIOPSY;  Surgeon: Duane, William Charles, MD;  Location: [facility]     COLONOSCOPY WITH CO2 INSUFFLATION N/A 12/9/2016    Procedure: COLONOSCOPY WITH CO2 INSUFFLATION;  Surgeon: Duane, William Charles, MD;  Location: [facility]     COLONOSCOPY WITH CO2 INSUFFLATION N/A 3/15/2017    Procedure: COLONOSCOPY WITH CO2 INSUFFLATION;  Surgeon: Duane, William Charles, MD;  Location: [facility]     JOINT REPLACEMENT, HIP RT/LT      Joint Replacement Hip RT/LT-both replced in the last 5 years     TONSILLECTOMY         Social History     Tobacco Use     Smoking status: Never Smoker     Smokeless tobacco: Never Used   Substance Use Topics     Alcohol use: Yes     Comment: occasionally     Family History   Problem Relation Age of Onset     Alzheimer Disease Mother      Hypertension Mother      Heart Disease Father         CHF     Alzheimer Disease Paternal Grandmother      Alzheimer Disease Paternal Grandfather      Cancer Brother         esophageal cancer     Prostate Cancer Brother      Prostate Cancer Brother      Diabetes Sister      Unknown/Adopted Son         adopted     Asthma No family hx of      C.A.D. No family hx of      Cerebrovascular Disease No family hx of      Breast Cancer No family hx of      Cancer - colorectal No family hx of      Alcohol/Drug No family hx of      Allergies No family hx of      Anesthesia Reaction No family hx of      Arthritis No family hx of      Blood Disease No family hx of      Cardiovascular No family hx of      Circulatory No family hx of      Congenital Anomalies No family hx of      Connective Tissue Disorder No family hx of      Depression No family hx of      Endocrine Disease No family hx of      Genetic Disorder No family hx of      Eye Disorder No family hx of      Gastrointestinal Disease No family hx of      Genitourinary Problems No family hx of      Gynecology No family hx of      Lipids No family hx of       "Musculoskeletal Disorder No family hx of      Neurologic Disorder No family hx of      Obesity No family hx of      Osteoporosis No family hx of      Psychotic Disorder No family hx of      Respiratory No family hx of      Thyroid Disease No family hx of      Family History Negative No family hx of      Hearing Loss No family hx of      Substance Abuse No family hx of      Mental Illness No family hx of      Anxiety Disorder No family hx of      Hyperlipidemia No family hx of          Current Outpatient Medications   Medication Sig Dispense Refill     aspirin 325 MG EC tablet Take 1 tablet by mouth daily. 100 tablet 3     atorvastatin (LIPITOR) 20 MG tablet TAKE 1 TABLET EVERY DAY 90 tablet 3     dutasteride (AVODART) 0.5 MG capsule Take 1 capsule (0.5 mg) by mouth daily 90 capsule 3     Krill Oil 1000 MG CAPS Take by mouth daily       losartan (COZAAR) 100 MG tablet Take 1 tablet (100 mg) by mouth daily 90 tablet 3     Misc Natural Products (GLUCOSAMINE CHONDROITIN ADV PO) 2-3 tablets daily.        Multiple Vitamins-Minerals (MULTIVITAL PO) Once daily.        tamsulosin (FLOMAX) 0.4 MG capsule TAKE 2 CAPSULES EVERY  capsule 3     CIALIS 20 MG tablet Take 1/2 to 1 tablet by  mouth daily as needed,  never use with  nitroglycerin, terazosin,  or doxazosin (Patient not taking: Reported on 7/30/2019) 9 tablet 0     Allergies   Allergen Reactions     Lisinopril Cough     Persistent cough with Lisinopril         Reviewed and updated as needed this visit by Provider         Review of Systems   ROS COMP: Constitutional, HEENT, cardiovascular, pulmonary, gi and gu systems are negative, except as otherwise noted.      Objective    /82 (BP Location: Right arm, Patient Position: Sitting, Cuff Size: Adult Regular)   Pulse 58   Temp 97.1  F (36.2  C) (Temporal)   Ht 1.715 m (5' 7.5\")   Wt 96.9 kg (213 lb 9.6 oz)   SpO2 96%   BMI 32.96 kg/m    Body mass index is 32.96 kg/m .  Physical Exam   GENERAL: healthy, " "alert and no distress  MS: Left shoulder - abduction after 90 degree is associated with pain. Internal and external rotation is good. No effusion or redness.    Diagnostic Test Results:  Labs reviewed in Epic        Assessment & Plan     1.  Left shoulder impingement syndrome.  We will proceed with cortisone injection.    2.  Benign prostatic hypertrophy with lower urinary obstructive symptoms.  Patient already on Avodart and tamsulosin.  Recent increase in symptoms.  Will refer to urology.    Patient was informed of the risk, benefit and alternative treatments were discussed.  He was given opportunity to ask questions and all questions were answered.  He consented treatment plan.  All aseptic precautions were taken.  5 mL of 1% Xylocaine was used as local anesthesia.  Using a 25-gauge needle the left shoulder was injected with 80 mg of Kenalog using a subacromial approach.  Patient tolerated procedure well.       BMI:   Estimated body mass index is 32.96 kg/m  as calculated from the following:    Height as of this encounter: 1.715 m (5' 7.5\").    Weight as of this encounter: 96.9 kg (213 lb 9.6 oz).               No follow-ups on file.    Adam Soliz MD  [facility]      "

## 2019-07-30 NOTE — NURSING NOTE
Clinic Administered Medication Documentation      Injection Documentation     Injection was administered by provider (please see MAR for given by information). Please see MAR and medication order for additional information.     Site: Joint injection   Medication Used: Kenalog 80 mg  Exp: 01/2021    [facility]    Drug Administration Record    Drug Name: Lidocaine  Dose: Administered by Dr. Adam Soliz  Route administered: Intra articular  NDC #: Lidocaine (4859-7902-23)  Amount of waste(mL): None  Reason for waste: none wasted    LOT #: [id number]  SITE: Left shoulder  : [employer]  EXPIRATION DATE: 10/2020    [facility]

## 2019-09-03 ENCOUNTER — OFFICE VISIT (OUTPATIENT)
Dept: UROLOGY | Facility: CLINIC | Age: 69
End: 2019-09-03
Attending: INTERNAL MEDICINE
Payer: MEDICARE

## 2019-09-03 ENCOUNTER — TELEPHONE (OUTPATIENT)
Dept: PEDIATRICS | Facility: CLINIC | Age: 69
End: 2019-09-03

## 2019-09-03 VITALS — DIASTOLIC BLOOD PRESSURE: 81 MMHG | OXYGEN SATURATION: 96 % | SYSTOLIC BLOOD PRESSURE: 128 MMHG | HEART RATE: 72 BPM

## 2019-09-03 DIAGNOSIS — Z12.5 SCREENING PSA (PROSTATE SPECIFIC ANTIGEN): ICD-10-CM

## 2019-09-03 DIAGNOSIS — R35.0 BENIGN PROSTATIC HYPERPLASIA WITH URINARY FREQUENCY: ICD-10-CM

## 2019-09-03 DIAGNOSIS — N18.30 CKD (CHRONIC KIDNEY DISEASE) STAGE 3, GFR 30-59 ML/MIN (H): ICD-10-CM

## 2019-09-03 DIAGNOSIS — N40.1 BENIGN PROSTATIC HYPERPLASIA WITH URINARY FREQUENCY: ICD-10-CM

## 2019-09-03 DIAGNOSIS — Z80.42 FAMILY HISTORY OF PROSTATE CANCER: ICD-10-CM

## 2019-09-03 DIAGNOSIS — R39.9 LOWER URINARY TRACT SYMPTOMS (LUTS): Primary | ICD-10-CM

## 2019-09-03 LAB
ALBUMIN SERPL-MCNC: 3.8 G/DL (ref 3.4–5)
ANION GAP SERPL CALCULATED.3IONS-SCNC: 3 MMOL/L (ref 3–14)
BUN SERPL-MCNC: 23 MG/DL (ref 7–30)
CALCIUM SERPL-MCNC: 9.3 MG/DL (ref 8.5–10.1)
CHLORIDE SERPL-SCNC: 110 MMOL/L (ref 94–109)
CO2 SERPL-SCNC: 31 MMOL/L (ref 20–32)
CREAT SERPL-MCNC: 1.13 MG/DL (ref 0.66–1.25)
GFR SERPL CREATININE-BSD FRML MDRD: 65 ML/MIN/{1.73_M2}
GLUCOSE SERPL-MCNC: 98 MG/DL (ref 70–99)
PHOSPHATE SERPL-MCNC: 2.8 MG/DL (ref 2.5–4.5)
POTASSIUM SERPL-SCNC: 4.3 MMOL/L (ref 3.4–5.3)
PSA SERPL-ACNC: 1.72 UG/L (ref 0–4)
SODIUM SERPL-SCNC: 144 MMOL/L (ref 133–144)

## 2019-09-03 PROCEDURE — 80069 RENAL FUNCTION PANEL: CPT | Performed by: INTERNAL MEDICINE

## 2019-09-03 PROCEDURE — G0103 PSA SCREENING: HCPCS | Performed by: UROLOGY

## 2019-09-03 PROCEDURE — 99204 OFFICE O/P NEW MOD 45 MIN: CPT | Performed by: UROLOGY

## 2019-09-03 PROCEDURE — 36415 COLL VENOUS BLD VENIPUNCTURE: CPT | Performed by: INTERNAL MEDICINE

## 2019-09-03 ASSESSMENT — PAIN SCALES - GENERAL: PAINLEVEL: NO PAIN (0)

## 2019-09-03 NOTE — PROGRESS NOTES
Urology Consult History and Physical  JEWEL HAINES  Name: Driss Pope    MRN: [id number]   YOB: 1950       We were asked to see Driss Pope at the request of Dr. Soliz for evaluation and treatment of LUTS.        Chief Complaint:   LUTS    History is obtained from the patient            History of Present Illness:   Driss Pope is a 69 year old male who is being seen for evaluation of LUTS    Has had frequency for 5-6 years.   LUTS: slow stream, frequency, urgency, urge incontinence - uses diapers when going for a longer drive  Has been on tamsulosin 0.4mg ([facility]) [facility]  Started dutaseride 3-4 months ago.   No real changes with dutasteride.  No prior acute urinary retention.     AUASS: 1-9-8-2--5-1-3 = 17  QOL = 4    2 of 5 brothers with prostate cancer. One brother diagnosed in 50-55 and treated with surgery and another in early 60s treated with radiation.    (4 or >)  Location: Bladder   Quality: frequency, urgency  Severity: Persistent symptoms on dual therapy medical management  Duration: 5-6 years           Past Medical History:     Past Medical History:   Diagnosis Date     CKD (chronic kidney disease)      Hyperlipidemia LDL goal < 100      Hypertension goal BP (blood pressure) < 130/80      Hypertriglyceridemia      Impingement syndrome of both shoulders 3/29/2019     Obesity      Osteoarthritis             Past Surgical History:     Past Surgical History:   Procedure Laterality Date     ARTHROSCOPY KNEE RT/LT      Left knee     COLONOSCOPY  10/4/2011    Procedure:COLONOSCOPY; Colonoscopy, screening; Surgeon:HANNAH COMER; [facility]     COLONOSCOPY  10/4/2011    Procedure:COMBINED COLONOSCOPY, REMOVE TUMOR/POLYP/LESION BY SNARE; Surgeon:HANNAH COMER; [facility]     COLONOSCOPY N/A 12/9/2016    Procedure: COMBINED COLONOSCOPY, SINGLE OR MULTIPLE BIOPSY/POLYPECTOMY BY BIOPSY;  Surgeon: Duane, William Charles, MD;  Location: [facility]     COLONOSCOPY  WITH CO2 INSUFFLATION N/A 12/9/2016    Procedure: COLONOSCOPY WITH CO2 INSUFFLATION;  Surgeon: Duane, William Charles, MD;  Location: [facility]     COLONOSCOPY WITH CO2 INSUFFLATION N/A 3/15/2017    Procedure: COLONOSCOPY WITH CO2 INSUFFLATION;  Surgeon: Duane, William Charles, MD;  Location: [facility]     JOINT REPLACEMENT, HIP RT/LT      Joint Replacement Hip RT/LT-both replced in the last 5 years     TONSILLECTOMY              Social History:     Social History     Tobacco Use     Smoking status: Never Smoker     Smokeless tobacco: Never Used   Substance Use Topics     Alcohol use: Yes     Comment: occasionally       History   Smoking Status     Never Smoker   Smokeless Tobacco     Never Used            Family History:     Family History   Problem Relation Age of Onset     Alzheimer Disease Mother      Hypertension Mother      Heart Disease Father         CHF     Alzheimer Disease Paternal Grandmother      Alzheimer Disease Paternal Grandfather      Cancer Brother         esophageal cancer     Prostate Cancer Brother      Prostate Cancer Brother      Diabetes Sister      Unknown/Adopted Son         adopted     Asthma No family hx of      C.A.D. No family hx of      Cerebrovascular Disease No family hx of      Breast Cancer No family hx of      Cancer - colorectal No family hx of      Alcohol/Drug No family hx of      Allergies No family hx of      Anesthesia Reaction No family hx of      Arthritis No family hx of      Blood Disease No family hx of      Cardiovascular No family hx of      Circulatory No family hx of      Congenital Anomalies No family hx of      Connective Tissue Disorder No family hx of      Depression No family hx of      Endocrine Disease No family hx of      Genetic Disorder No family hx of      Eye Disorder No family hx of      Gastrointestinal Disease No family hx of      Genitourinary Problems No family hx of      Gynecology No family hx of      Lipids No family hx of      Musculoskeletal Disorder  No family hx of      Neurologic Disorder No family hx of      Obesity No family hx of      Osteoporosis No family hx of      Psychotic Disorder No family hx of      Respiratory No family hx of      Thyroid Disease No family hx of      Family History Negative No family hx of      Hearing Loss No family hx of      Substance Abuse No family hx of      Mental Illness No family hx of      Anxiety Disorder No family hx of      Hyperlipidemia No family hx of               Allergies:     Allergies   Allergen Reactions     Lisinopril Cough     Persistent cough with Lisinopril            Medications:     Current Outpatient Medications   Medication Sig     aspirin 325 MG EC tablet Take 1 tablet by mouth daily.     atorvastatin (LIPITOR) 20 MG tablet TAKE 1 TABLET EVERY DAY     CIALIS 20 MG tablet Take 1/2 to 1 tablet by  mouth daily as needed,  never use with  nitroglycerin, terazosin,  or doxazosin     dutasteride (AVODART) 0.5 MG capsule Take 1 capsule (0.5 mg) by mouth daily     Krill Oil 1000 MG CAPS Take by mouth daily     losartan (COZAAR) 100 MG tablet Take 1 tablet (100 mg) by mouth daily     Misc Natural Products (GLUCOSAMINE CHONDROITIN ADV PO) 2-3 tablets daily.      Multiple Vitamins-Minerals (MULTIVITAL PO) Once daily.      tamsulosin (FLOMAX) 0.4 MG capsule TAKE 2 CAPSULES EVERY DAY     No current facility-administered medications for this visit.              Review of Systems:     Skin: negative  Eyes: negative  Ears/Nose/Throat: negative  Respiratory: No shortness of breath, dyspnea on exertion, cough, or hemoptysis  Cardiovascular: negative  Gastrointestinal: negative  Genitourinary: as above  Musculoskeletal: negative  Neurologic: negative  Psychiatric: negative  Hematologic/Lymphatic/Immunologic: negative  Endocrine: negative          Physical Exam:     Patient Vitals for the past 24 hrs:   BP Pulse SpO2   09/03/19 0910 128/81 72 96 %     There is no height or weight on file to calculate BMI.     General:  age-appropriate appearing male in NAD  HEENT: Head AT/NC, EOMI, CN Grossly intact  Lungs: no respiratory distress, or pursed lip breathing  Heart: No obvious jugular venous distension present  Back: no bony midline tenderness, no CVAT bilaterally.  Abdomen: soft, obesely-distended, non-tender. No organomegaly  : normal male external genitalia.   Rectal: ~40cc gland, no nodules or induration   Lymph: no palpable inguinal lymphadenopathy.  LE: no edema.   Musculoskeltal: extremities normal, no peripheral edema  Skin: no suspicious lesions or rashes  Neuro: Alert, oriented, speech and mentation normal;  moving all 4 extremities equally.  Psych: affect and mood normal          Data:   All laboratory data reviewed:    UA RESULTS:  Recent Labs   Lab Test 09/02/15  1438 06/24/14  0913   COLOR Yellow Yellow   APPEARANCE Clear Clear   URINEGLC Negative Negative   URINEBILI Negative Negative   URINEKETONE Negative Negative   SG 1.020 1.014   UBLD Negative Negative   URINEPH 5.5 5.0   PROTEIN Negative Negative   UROBILINOGEN 0.2  --    NITRITE Negative Negative   LEUKEST Negative Negative   RBCU  --  O - 2   WBCU  --  O - 2     Component PSA   Latest Ref Rng & Units 0 - 4 ug/L   7/9/2009 2.15   5/26/2010 2.25   4/29/2011 2.55   9/19/2012 5.41 (H)   3/18/2013 2.72   10/29/2014 2.63   12/14/2015 3.73   1/2/2017 3.37     Lab Results   Component Value Date    CR 1.20 03/29/2019             Impression and Plan:   Impression:   69 year old man with Moderate LUTS and positive family history of prostate cancer      Plan:   Moderate LUTS  - We reviewed the pathophysiology of the bladder and the prostate and the normal aging process associated with BPH.  His symptoms remain moderate LUTS, with very bothersome frequency and urgency.  His urgency is bad enough that he has had urge incontinence and uses diapers when going on long road trips.  - He has been on dual agent therapy, tamsulosin for 5 to 6 years and dutasteride for the past 3  to 6 months.  We reviewed the mechanism of action and common side effects of each medication.  - We discussed that when symptoms persist despite medical management, the next step is cystoscopy to evaluate his bladder outlet and then further discuss bladder outlet treatment options.  - cystoscopy in the near future    Family history of prostate cancer  - He does have 2 brothers with a history of prostate cancer.  One diagnosed at-early 50s the other diagnosed in early 60s.  -Last PSA from January 2017 3.37  - PSA today     Thank you for the kind consultation.    Time spent: 30 minutes of which >50% was spent counseling.    Oscar Resendiz MD   Urology  [facility]  [facility] Phone: [phone]  [facility] Phone: [phone]

## 2019-09-03 NOTE — NURSING NOTE
Driss Pope's goals for this visit include:   Chief Complaint   Patient presents with     Consult     BPH w/ LUTS       He requests these members of his care team be copied on today's visit information: Yes    PCP: Adam Soliz    Referring Provider:  Adam Soliz MD  [id number] [place] [address]    /81 (BP Location: Left arm, Patient Position: Sitting, Cuff Size: Adult Regular)   Pulse 72   SpO2 96%     Do you need any medication refills at today's visit? No

## 2019-09-03 NOTE — Clinical Note
Anton Soliz,I saw Gary today in consultation for his LUTS.  He does have moderate to severe symptoms despite dual agent medical management.  I will bring him back for cystoscopy to evaluate his bladder outlet and discuss options for bladder outlet procedures.  Given his family history of prostate cancer, I also ordered an updated PSA which she will obtain today.Thanks, Oscar Resendiz M.D.Cell: [phone]

## 2019-09-03 NOTE — TELEPHONE ENCOUNTER
Left message for patient to return clinic call regarding scheduling. Patient needs a Return  appointment for 6 month recheck with Adam Soliz MD on or around 9/30/19 and nonfasting labs (these labs appear to be done already). Number to clinic and Mychart option given, please assist in scheduling once patient returns clinic call.       Message   Received: 5 months ago   Message Contents   Adam Soliz MD Smith-Sheeder, Katrina N             Appointment with none fasting lab in 6 months. Lab order placed.Thanks     Adam          Call Center OKAY TO SCHEDULE.    Thanks,   Flavia Teran  Primary Care   [facility]

## 2019-09-05 DIAGNOSIS — N18.30 CKD (CHRONIC KIDNEY DISEASE) STAGE 3, GFR 30-59 ML/MIN (H): Primary | ICD-10-CM

## 2019-09-06 NOTE — TELEPHONE ENCOUNTER
This patient has been seen by you in clinic since you originally requested a 6 month follow-up for the end of September 2019.  No instructions for followup on those encounters.   Pl

## 2019-09-12 ENCOUNTER — OFFICE VISIT (OUTPATIENT)
Dept: UROLOGY | Facility: CLINIC | Age: 69
End: 2019-09-12
Payer: MEDICARE

## 2019-09-12 VITALS
HEIGHT: 68 IN | OXYGEN SATURATION: 96 % | HEART RATE: 61 BPM | BODY MASS INDEX: 32.96 KG/M2 | SYSTOLIC BLOOD PRESSURE: 133 MMHG | DIASTOLIC BLOOD PRESSURE: 82 MMHG

## 2019-09-12 DIAGNOSIS — N40.1 BENIGN NON-NODULAR PROSTATIC HYPERPLASIA WITH LOWER URINARY TRACT SYMPTOMS: Primary | ICD-10-CM

## 2019-09-12 DIAGNOSIS — I10 HYPERTENSION, GOAL BELOW 140/90: ICD-10-CM

## 2019-09-12 PROCEDURE — 52000 CYSTOURETHROSCOPY: CPT | Performed by: UROLOGY

## 2019-09-12 ASSESSMENT — PAIN SCALES - GENERAL: PAINLEVEL: NO PAIN (0)

## 2019-09-12 NOTE — PROGRESS NOTES
"[facility]   CHIEF COMPLAINT   It was my pleasure to see Driss Pope who is a 69 year old male for follow-up of BPH with LUTS.      HPI   Driss Pope is a very pleasant 69 year old male who presents with a history of BPH with LUTS.    First seen on 9/3/19:  \"Has had frequency for 5-6 years.   LUTS: slow stream, frequency, urgency, urge incontinence - uses diapers when going for a longer drive  Has been on tamsulosin 0.4mg (Flomax) for 5-6 years at [facility]  Started dutaseride 3-4 months ago.   No real changes with dutasteride.  No prior acute urinary retention.      AUASS: 3-8-5-2--5-1-3 = 17  QOL = 4     2 of 5 brothers with prostate cancer. One brother diagnosed in 50-55 and treated with surgery and another in early 60s treated with radiation.\"    He was counseled on the need for repeat PSA and return for cystoscopy.     Past Medical History:   Diagnosis Date     CKD (chronic kidney disease)      Hyperlipidemia LDL goal < 100      Hypertension goal BP (blood pressure) < 130/80      Hypertriglyceridemia      Impingement syndrome of both shoulders 3/29/2019     Obesity      Osteoarthritis      Past Surgical History:   Procedure Laterality Date     ARTHROSCOPY KNEE RT/LT      Left knee     COLONOSCOPY  10/4/2011    Procedure:COLONOSCOPY; Colonoscopy, screening; Surgeon:HANNAH COMER; [facility]     COLONOSCOPY  10/4/2011    Procedure:COMBINED COLONOSCOPY, REMOVE TUMOR/POLYP/LESION BY SNARE; Surgeon:HANNAH COMER; [facility]     COLONOSCOPY N/A 12/9/2016    Procedure: COMBINED COLONOSCOPY, SINGLE OR MULTIPLE BIOPSY/POLYPECTOMY BY BIOPSY;  Surgeon: Duane, William Charles, MD;  Location: [facility]     COLONOSCOPY WITH CO2 INSUFFLATION N/A 12/9/2016    Procedure: COLONOSCOPY WITH CO2 INSUFFLATION;  Surgeon: Duane, William Charles, MD;  [facility]: [facility]     COLONOSCOPY WITH CO2 INSUFFLATION N/A 3/15/2017    Procedure: COLONOSCOPY WITH CO2 INSUFFLATION;  Surgeon: Duane, William Charles, MD;  Location: [facility] " "OR     JOINT REPLACEMENT, HIP RT/LT      Joint Replacement Hip RT/LT-both replced in the last 5 years     TONSILLECTOMY       Current Outpatient Medications   Medication     aspirin 325 MG EC tablet     atorvastatin (LIPITOR) 20 MG tablet     CIALIS 20 MG tablet     dutasteride (AVODART) 0.5 MG capsule     Krill Oil 1000 MG CAPS     losartan (COZAAR) 100 MG tablet     Misc Natural Products (GLUCOSAMINE CHONDROITIN ADV PO)     Multiple Vitamins-Minerals (MULTIVITAL PO)     tamsulosin (FLOMAX) 0.4 MG capsule     No current facility-administered medications for this visit.         PHYSICAL EXAM  Patient is a 69 year old  male   Vitals: Blood pressure 133/82, pulse 61, height 1.715 m (5' 7.5\"), SpO2 96 %.  General Appearance Adult: Body mass index is 32.96 kg/m .  Alert, no acute distress, oriented  HENT: throat/mouth:normal, good dentition  Lungs: no respiratory distress, or pursed lip breathing  Heart: No obvious jugular venous distension present  Abdomen: soft, nontender, no organomegaly or masses  Musculoskeltal: extremities normal, no peripheral edema  Skin: no suspicious lesions or rashes  Neuro: Alert, oriented, speech and mentation normal  Psych: affect and mood normal  Gait: Normal    UA RESULTS:  Recent Labs   Lab Test 09/02/15  1438 06/24/14  0913   COLOR Yellow Yellow   APPEARANCE Clear Clear   URINEGLC Negative Negative   URINEBILI Negative Negative   URINEKETONE Negative Negative   SG 1.020 1.014   UBLD Negative Negative   URINEPH 5.5 5.0   PROTEIN Negative Negative   UROBILINOGEN 0.2  --    NITRITE Negative Negative   LEUKEST Negative Negative   RBCU  --  O - 2   WBCU  --  O - 2     Component PSA   Latest Ref Rng & Units 0 - 4 ug/L   7/9/2009 2.15   5/26/2010 2.25   4/29/2011 2.55   9/19/2012 5.41 (H)   3/18/2013 2.72   10/29/2014 2.63   12/14/2015 3.73   1/2/2017 3.37   9/3/2019 1.72         ASSESSMENT and PLAN  69 year old man with BPH with LUTS    LUTS  -his cystoscopy today was significant for " enlarged prostate with obstructive lateral lobes as well as a large median lobe protruding into the bladder.  There is also evidence of moderate bladder trabeculation.  -We discussed treatment options for his bladder outlet.  We discussed options for office-based procedures such as Rezum vs. operative procedures being a bipolar TURP or a Greenlight photovaporization of the prostate (PVP).  We discussed the risks and benefits of each procedure.  -He would like to proceed with a Greenlight photovaporization of the prostate (PVP) and orders for the surgery placed.  We discussed that this will take place at [facility].      I spent over 25 minutes with the patient.  Over half this time was spent on counseling regarding the above.    Oscar Resendiz MD   Urology  [facility]  Clinic Phone [phone]

## 2019-09-12 NOTE — Clinical Note
Anton Soliz,I saw Gary back today for his cystoscopy for evaluation of his BPH with LUTS.  He does have significant prostatic hypertrophy with a large median lobe.  We discussed treatment options and he is asked to proceed with a Greenlight photovaporization of the prostate (PVP).Thanks, Oscar Resendiz M.D.Cell: [phone]

## 2019-09-12 NOTE — NURSING NOTE
"Driss Pope's goals for this visit include:   Chief Complaint   Patient presents with     Procedure     Cystoscopy       He requests these members of his care team be copied on today's visit information: yes    PCP: Adam Soliz    Referring Provider:  No referring provider defined for this encounter.    /82   Pulse 61   Ht 1.715 m (5' 7.5\")   SpO2 96%   BMI 32.96 kg/m      Do you need any medication refills at today's visit? No    Esteban Dawson CMA      "

## 2019-09-12 NOTE — PROCEDURES
CYSTOSCOPY PROCEDURE NOTE:    Driss Pope is a 69 year old male  who presents with BPH with LUTS for cystoscopy.    Pt ID verified with patient: Yes     Procedure verified with patient: Yes     Procedure confirmed with physician and support staff: Yes     Consent form confirmed with physician and support staff.    Sign In  History and Physical Exam reviewed .  Informed Consent Discussed: Yes   Sign in Communication: Yes   Time Out:  Team Confirms the Correct Patient, Correct Procedure; Yes , Correct Site and Site Marking, Correct Position (if applicable).    Affirmation of Time Out: Yes   Sign Out:  Sign Out Discussion: Yes   Physician: Oscar Resendiz MD    A urinalysis was performed revealing no evidence of infection.    The benefits, risks, alternatives of the cystoscopy procedure and personnel were discussed with the patient. The verbal consent was obtained and the patient agrees to proceed.      Description of procedure:   After fully informed, voluntary consent was obtained, the patient was brought into the procedure room, identified and placed in a supine position on the cystoscopy table.  The groin/scrotum were prepped with betadine and draped in a sterile fashion.  Urojet lidocaine gel was introduced.  A 15F flexible cystoscope was inserted into the urethra, and the bladder and urethra wereexamined in a systematic manner.  The patient tolerated the procedure well and there were no complications.      Cystoscopic findings:  The urethra was normal without strictures.  The prostate was 4-5cm long and demonstrated significant bilobar hypertrophy.  There was a large median lobe protruding into the bladder.  The external sphincter coapted normally and the bladder neck was high. The bladder was  entered and careful pan endoscopy was carried out. The posterior, superior and lateral walls and dome of the bladder were all well visualized and the scope was retroflexed upon itself..  There was moderate  trabeculation.  There were no neoplasms, stones, or diverticula identifed.  The ureteric orifices were normal in position and number and effluxing clear urine.    Assessment/Plan:   Driss Pope is a 69 year old male with a history of BPH with LUTS now with evidence of significant tri-lobar prostatic hypertrophy     - See clinic note    Oscar Resendiz MD

## 2019-09-13 DIAGNOSIS — N40.1 BENIGN NON-NODULAR PROSTATIC HYPERPLASIA WITH LOWER URINARY TRACT SYMPTOMS: Primary | ICD-10-CM

## 2019-09-30 ENCOUNTER — OFFICE VISIT (OUTPATIENT)
Dept: PEDIATRICS | Facility: CLINIC | Age: 69
End: 2019-09-30
Payer: MEDICARE

## 2019-09-30 VITALS
HEART RATE: 64 BPM | DIASTOLIC BLOOD PRESSURE: 74 MMHG | OXYGEN SATURATION: 96 % | SYSTOLIC BLOOD PRESSURE: 126 MMHG | TEMPERATURE: 97.1 F | WEIGHT: 228.4 LBS | HEIGHT: 68 IN | BODY MASS INDEX: 34.62 KG/M2

## 2019-09-30 DIAGNOSIS — Z01.818 PREOP GENERAL PHYSICAL EXAM: Primary | ICD-10-CM

## 2019-09-30 DIAGNOSIS — E66.01 CLASS 2 SEVERE OBESITY DUE TO EXCESS CALORIES WITH SERIOUS COMORBIDITY AND BODY MASS INDEX (BMI) OF 35.0 TO 35.9 IN ADULT (H): ICD-10-CM

## 2019-09-30 DIAGNOSIS — I10 HYPERTENSION, GOAL BELOW 140/90: ICD-10-CM

## 2019-09-30 DIAGNOSIS — Z23 NEED FOR PROPHYLACTIC VACCINATION AND INOCULATION AGAINST INFLUENZA: ICD-10-CM

## 2019-09-30 DIAGNOSIS — M75.42 IMPINGEMENT SYNDROME OF BOTH SHOULDERS: ICD-10-CM

## 2019-09-30 DIAGNOSIS — E78.2 MIXED DYSLIPIDEMIA: ICD-10-CM

## 2019-09-30 DIAGNOSIS — E66.812 CLASS 2 SEVERE OBESITY DUE TO EXCESS CALORIES WITH SERIOUS COMORBIDITY AND BODY MASS INDEX (BMI) OF 35.0 TO 35.9 IN ADULT (H): ICD-10-CM

## 2019-09-30 DIAGNOSIS — N18.30 CKD (CHRONIC KIDNEY DISEASE) STAGE 3, GFR 30-59 ML/MIN (H): ICD-10-CM

## 2019-09-30 DIAGNOSIS — N40.1 BENIGN NON-NODULAR PROSTATIC HYPERPLASIA WITH LOWER URINARY TRACT SYMPTOMS: ICD-10-CM

## 2019-09-30 DIAGNOSIS — M75.41 IMPINGEMENT SYNDROME OF BOTH SHOULDERS: ICD-10-CM

## 2019-09-30 PROCEDURE — 99214 OFFICE O/P EST MOD 30 MIN: CPT | Mod: 25 | Performed by: INTERNAL MEDICINE

## 2019-09-30 PROCEDURE — 90662 IIV NO PRSV INCREASED AG IM: CPT | Performed by: INTERNAL MEDICINE

## 2019-09-30 PROCEDURE — 93000 ELECTROCARDIOGRAM COMPLETE: CPT | Performed by: INTERNAL MEDICINE

## 2019-09-30 PROCEDURE — G0008 ADMIN INFLUENZA VIRUS VAC: HCPCS | Performed by: INTERNAL MEDICINE

## 2019-09-30 ASSESSMENT — MIFFLIN-ST. JEOR: SCORE: 1762.58

## 2019-09-30 NOTE — PROGRESS NOTES
[address] [address]  [phone]  Dept: [phone]    PRE-OP EVALUATION:  Today's date: 2019    Driss Pope (: 1950) presents for pre-operative evaluation assessment as requested by Martín Escoto.  He requires evaluation and anesthesia risk assessment prior to undergoing surgery/procedure for treatment of CYSTOSCOPY, VAPORIZATION, PROSTATE, PHOTOSELECTIVE, USING [address] KTP LASER  .    Proposed Surgery/ Procedure: CYSTOSCOPY, VAPORIZATION, PROSTATE, PHOTOSELECTIVE, USING [address] KTP LASER  Date of Surgery/ Procedure: 10/02/19  Time of Surgery/ Procedure: 11: 00 am  Hospital/Surgical Facility: [facility]  Fax number for surgical facility: [phone]  Primary Physician: Adam Soliz  Type of Anesthesia Anticipated: General    Patient has a Health Care Directive or Living Will:  NO    1. NO - Do you have a history of heart attack, stroke, stent, bypass or surgery on an artery in the head, neck, heart or legs?  2. YES- Do you ever have any pain or discomfort in your chest? Acid reflux  3. NO - Do you have a history of  Heart Failure?  4. NO - Are you troubled by shortness of breath when: walking on the level, up a slight hill or at night?  5. NO - Do you currently have a cold, bronchitis or other respiratory infection?  6. NO - Do you have a cough, shortness of breath or wheezing?  7. NO - Do you sometimes get pains in the calves of your legs when you walk?  8. NO - Do you or anyone in your family have previous history of blood clots?  9. NO - Do you or does anyone in your family have a serious bleeding problem such as prolonged bleeding following surgeries or cuts?  10. NO - Have you ever had problems with anemia or been told to take iron pills?  11. NO - Have you had any abnormal blood loss such as black, tarry or bloody stools, or abnormal vaginal bleeding?  12. NO - Have  you ever had a blood transfusion?  13. NO - Have you or any of your relatives ever had problems with anesthesia?  14. NO - Do you have sleep apnea, excessive snoring or daytime drowsiness?  15. NO - Do you have any prosthetic heart valves?  16. YES - Do you have prosthetic joints? Hips  17. NO - Is there any chance that you may be pregnant?      HPI:     HPI related to upcoming procedure:     69-year-old gentleman with known history of hypertension, dyslipidemia and class II obesity has had symptoms of benign prostatic hypertrophy with lower urinary tract symptoms for the last few years.  He has been treated medically with tamsulosin and dutasteride.  His symptoms are worsened.  Recent cystoscopy revealed enlarged prostate.  He is to undergo greenlight photo vaporization of the prostate.    The patient offers no complaints.  Denies chest pain, shortness of breath, dizziness or lightheadedness.  Takes his medication as prescribed.          MEDICAL HISTORY:     Patient Active Problem List    Diagnosis Date Noted     Mixed dyslipidemia 09/30/2019     Priority: Medium     Acute gout 03/29/2019     Priority: Medium     Impingement syndrome of both shoulders 03/29/2019     Priority: Medium     Olecranon bursitis, left elbow 09/07/2018     Priority: Medium     Class 2 severe obesity due to excess calories with serious comorbidity in adult (H) 10/16/2017     Priority: Medium     Hypertension, goal below 140/90 10/11/2016     Priority: Medium     Benign non-nodular prostatic hyperplasia with lower urinary tract symptoms 12/14/2015     Priority: Medium     ED (erectile dysfunction) 06/24/2014     Priority: Medium     Advanced directives, counseling/discussion 09/25/2012     Priority: Medium     Patient states has Advance Directive and will bring in a copy to clinic. 9/25/2012          Abnormal PSA 09/25/2012     Priority: Medium     CKD (chronic kidney disease) stage 3, GFR 30-59 ml/min (H) 03/25/2011     Priority: Medium      Hypertriglyceridemia      Priority: Medium      Past Medical History:   Diagnosis Date     CKD (chronic kidney disease)      Hypertension goal BP (blood pressure) < 130/80      Impingement syndrome of both shoulders 3/29/2019     Mixed dyslipidemia 9/30/2019     Obesity      Osteoarthritis      Past Surgical History:   Procedure Laterality Date     ARTHROSCOPY KNEE RT/LT      Left knee     COLONOSCOPY  10/4/2011    Procedure:COLONOSCOPY; Colonoscopy, screening; Surgeon:HANNAH COMER; Location:[facility] OR     COLONOSCOPY  10/4/2011    Procedure:COMBINED COLONOSCOPY, REMOVE TUMOR/POLYP/LESION BY SNARE; Surgeon:HANNAH COMER; Location:[facility] OR     COLONOSCOPY N/A 12/9/2016    Procedure: COMBINED COLONOSCOPY, SINGLE OR MULTIPLE BIOPSY/POLYPECTOMY BY BIOPSY;  Surgeon: Duane, William Charles, MD;  Location: [facility] OR     COLONOSCOPY WITH CO2 INSUFFLATION N/A 12/9/2016    Procedure: COLONOSCOPY WITH CO2 INSUFFLATION;  Surgeon: Duane, William Charles, MD;  Location: [facility] OR     COLONOSCOPY WITH CO2 INSUFFLATION N/A 3/15/2017    Procedure: COLONOSCOPY WITH CO2 INSUFFLATION;  Surgeon: Duane, William Charles, MD;  Location: [facility] OR     JOINT REPLACEMENT, HIP RT/LT      Joint Replacement Hip RT/LT-both replced in the last 5 years     TONSILLECTOMY       Current Outpatient Medications   Medication Sig Dispense Refill     atorvastatin (LIPITOR) 20 MG tablet TAKE 1 TABLET EVERY DAY 90 tablet 3     dutasteride (AVODART) 0.5 MG capsule Take 1 capsule (0.5 mg) by mouth daily 90 capsule 3     losartan (COZAAR) 100 MG tablet Take 1 tablet (100 mg) by mouth daily 90 tablet 3     Misc Natural Products (GLUCOSAMINE CHONDROITIN ADV PO) 2-3 tablets daily.        Multiple Vitamins-Minerals (MULTIVITAL PO) Once daily.        tamsulosin (FLOMAX) 0.4 MG capsule TAKE 2 CAPSULES EVERY  capsule 3     aspirin 325 MG EC tablet Take 1 tablet by mouth daily. (Patient not taking: Reported on 9/30/2019) 100 tablet 3     CIALIS 20 MG tablet Take 1/2 to 1  "tablet by  mouth daily as needed,  never use with  nitroglycerin, terazosin,  or doxazosin (Patient not taking: Reported on 9/30/2019) 9 tablet 0     Krill Oil 1000 MG CAPS Take by mouth daily       OTC products: None, except as noted above    Allergies   Allergen Reactions     Lisinopril Cough     Persistent cough with Lisinopril      Latex Allergy: NO    Social History     Tobacco Use     Smoking status: Never Smoker     Smokeless tobacco: Never Used   Substance Use Topics     Alcohol use: Yes     Comment: occasionally     History   Drug Use No       REVIEW OF SYSTEMS:   Constitutional, neuro, ENT, endocrine, pulmonary, cardiac, gastrointestinal, genitourinary, musculoskeletal, integument and psychiatric systems are negative, except as otherwise noted.    EXAM:   /74 (BP Location: Right arm, Patient Position: Sitting, Cuff Size: Adult Large)   Pulse 64   Temp 97.1  F (36.2  C) (Temporal)   Ht 1.715 m (5' 7.5\")   Wt 103.6 kg (228 lb 6.4 oz)   SpO2 96%   BMI 35.24 kg/m      GENERAL APPEARANCE: healthy, alert and no distress     EYES: EOMI,  PERRL     HENT: ear canals and TM's normal and nose and mouth without ulcers or lesions     NECK: no adenopathy, no asymmetry, masses, or scars and thyroid normal to palpation     RESP: lungs clear to auscultation - no rales, rhonchi or wheezes     CV: regular rates and rhythm, normal S1 S2, no S3 or S4 and no murmur, click or rub     ABDOMEN:  soft, nontender, no HSM or masses and bowel sounds normal     MS: extremities normal- no gross deformities noted, no evidence of inflammation in joints, FROM in all extremities.     SKIN: no suspicious lesions or rashes     NEURO: Normal strength and tone, sensory exam grossly normal, mentation intact and speech normal     PSYCH: mentation appears normal. and affect normal/bright     LYMPHATICS: No cervical adenopathy    DIAGNOSTICS:   EKG: appears normal, NSR, sinus bradycardia, normal axis, normal intervals, no acute ST/T " changes c/w ischemia, no LVH by voltage criteria    Recent Labs   Lab Test 09/03/19  0946 05/30/19  0824 03/29/19  0909  03/26/18  0906  12/14/15  0816   HGB  --  13.3 13.6   < >  --    < >  --    PLT  --  247 207   < >  --    < >  --      --  143   < > 142   < >  --    POTASSIUM 4.3  --  4.4   < > 4.3   < >  --    CR 1.13  --  1.20   < > 1.36*   < >  --    A1C  --   --   --   --  5.4  --  5.3    < > = values in this interval not displayed.      On 9/3/2019 his creatinine was 1.13, BUN 23, electrolytes normal potassium 4.3.  Liver function studies normal PSA was 1.72.  Fasting blood sugar normal at 98.      IMPRESSION:   Diagnosis/reason for consult:     1.  Benign prostatic hypertrophy with lower urinary tract symptoms in spite of medical therapy.  Patient to undergo PVP surgery.  2.  Essential hypertension under good control.  3.  Mixed dyslipidemia well-controlled with atorvastatin.  4.  Class II obesity stable    The proposed surgical procedure is considered LOW risk.    REVISED CARDIAC RISK INDEX  The patient has the following serious cardiovascular risks for perioperative complications such as (MI, PE, VFib and 3  AV Block):  No serious cardiac risks  INTERPRETATION: 0 risks: Class I (very low risk - 0.4% complication rate)    The patient has the following additional risks for perioperative complications:  No identified additional risks      ICD-10-CM    1. Preop general physical exam Z01.818 EKG 12-lead complete w/read - Clinics   2. Benign non-nodular prostatic hyperplasia with lower urinary tract symptoms N40.1    3. CKD (chronic kidney disease) stage 3, GFR 30-59 ml/min (H) N18.3    4. Hypertension, goal below 140/90 I10    5. Mixed dyslipidemia E78.2    6. Class 2 severe obesity due to excess calories with serious comorbidity and body mass index (BMI) of 35.0 to 35.9 in adult (H) E66.01     Z68.35    7. Impingement syndrome of both shoulders M75.41     M75.42        RECOMMENDATIONS:     Patient is  medically optimized to undergo planned surgical procedure.        APPROVAL GIVEN to proceed with proposed procedure, without further diagnostic evaluation       Signed Electronically by: Adam Soliz MD    Copy of this evaluation report is provided to requesting physician.    [facility] Preop Guidelines    Revised Cardiac Risk Index

## 2019-10-02 ENCOUNTER — HOSPITAL ENCOUNTER (OUTPATIENT)
Facility: CLINIC | Age: 69
Discharge: HOME | End: 2019-10-02
Attending: UROLOGY | Admitting: UROLOGY
Payer: MEDICARE

## 2019-10-02 ENCOUNTER — ANESTHESIA (OUTPATIENT)
Dept: SURGERY | Facility: CLINIC | Age: 69
End: 2019-10-02
Payer: MEDICARE

## 2019-10-02 ENCOUNTER — ANESTHESIA EVENT (OUTPATIENT)
Dept: SURGERY | Facility: CLINIC | Age: 69
End: 2019-10-02
Payer: MEDICARE

## 2019-10-02 VITALS
RESPIRATION RATE: 16 BRPM | WEIGHT: 226 LBS | SYSTOLIC BLOOD PRESSURE: 133 MMHG | OXYGEN SATURATION: 95 % | HEART RATE: 64 BPM | TEMPERATURE: 97 F | BODY MASS INDEX: 35.47 KG/M2 | HEIGHT: 67 IN | DIASTOLIC BLOOD PRESSURE: 85 MMHG

## 2019-10-02 DIAGNOSIS — N40.1 BENIGN NON-NODULAR PROSTATIC HYPERPLASIA WITH LOWER URINARY TRACT SYMPTOMS: Primary | ICD-10-CM

## 2019-10-02 PROCEDURE — 25000125 ZZHC RX 250: Performed by: NURSE ANESTHETIST, CERTIFIED REGISTERED

## 2019-10-02 PROCEDURE — 25800025 ZZH RX 258: Performed by: UROLOGY

## 2019-10-02 PROCEDURE — 25800030 ZZH RX IP 258 OP 636: Performed by: NURSE ANESTHETIST, CERTIFIED REGISTERED

## 2019-10-02 PROCEDURE — 40000170 ZZH STATISTIC PRE-PROCEDURE ASSESSMENT II: Performed by: UROLOGY

## 2019-10-02 PROCEDURE — 36000073 ZZH SURGERY LEVEL 6 1ST 30 MIN: Performed by: UROLOGY

## 2019-10-02 PROCEDURE — 25000128 H RX IP 250 OP 636: Performed by: NURSE ANESTHETIST, CERTIFIED REGISTERED

## 2019-10-02 PROCEDURE — 37000008 ZZH ANESTHESIA TECHNICAL FEE, 1ST 30 MIN: Performed by: UROLOGY

## 2019-10-02 PROCEDURE — 25000566 ZZH SEVOFLURANE, EA 15 MIN: Performed by: UROLOGY

## 2019-10-02 PROCEDURE — 36000075 ZZH SURGERY LEVEL 6 EA 15 ADDTL MIN: Performed by: UROLOGY

## 2019-10-02 PROCEDURE — 25000125 ZZHC RX 250: Performed by: UROLOGY

## 2019-10-02 PROCEDURE — 27210794 ZZH OR GENERAL SUPPLY STERILE: Performed by: UROLOGY

## 2019-10-02 PROCEDURE — 37000009 ZZH ANESTHESIA TECHNICAL FEE, EACH ADDTL 15 MIN: Performed by: UROLOGY

## 2019-10-02 PROCEDURE — 71000027 ZZH RECOVERY PHASE 2 EACH 15 MINS: Performed by: UROLOGY

## 2019-10-02 PROCEDURE — 71000013 ZZH RECOVERY PHASE 1 LEVEL 1 EA ADDTL HR: Performed by: UROLOGY

## 2019-10-02 PROCEDURE — 52648 LASER SURGERY OF PROSTATE: CPT | Performed by: UROLOGY

## 2019-10-02 PROCEDURE — 25000128 H RX IP 250 OP 636: Performed by: UROLOGY

## 2019-10-02 PROCEDURE — 71000012 ZZH RECOVERY PHASE 1 LEVEL 1 FIRST HR: Performed by: UROLOGY

## 2019-10-02 RX ORDER — ONDANSETRON 4 MG/1
4 TABLET, ORALLY DISINTEGRATING ORAL EVERY 30 MIN PRN
Status: DISCONTINUED | OUTPATIENT
Start: 2019-10-02 | End: 2019-10-02 | Stop reason: HOSPADM

## 2019-10-02 RX ORDER — SODIUM CHLORIDE 9 MG/ML
INJECTION, SOLUTION INTRAVENOUS CONTINUOUS PRN
Status: DISCONTINUED | OUTPATIENT
Start: 2019-10-02 | End: 2019-10-02

## 2019-10-02 RX ORDER — SODIUM CHLORIDE, SODIUM LACTATE, POTASSIUM CHLORIDE, CALCIUM CHLORIDE 600; 310; 30; 20 MG/100ML; MG/100ML; MG/100ML; MG/100ML
INJECTION, SOLUTION INTRAVENOUS CONTINUOUS
Status: DISCONTINUED | OUTPATIENT
Start: 2019-10-02 | End: 2019-10-02 | Stop reason: HOSPADM

## 2019-10-02 RX ORDER — HYDROMORPHONE HYDROCHLORIDE 1 MG/ML
.3-.5 INJECTION, SOLUTION INTRAMUSCULAR; INTRAVENOUS; SUBCUTANEOUS EVERY 10 MIN PRN
Status: DISCONTINUED | OUTPATIENT
Start: 2019-10-02 | End: 2019-10-02 | Stop reason: HOSPADM

## 2019-10-02 RX ORDER — SODIUM CHLORIDE, SODIUM LACTATE, POTASSIUM CHLORIDE, CALCIUM CHLORIDE 600; 310; 30; 20 MG/100ML; MG/100ML; MG/100ML; MG/100ML
INJECTION, SOLUTION INTRAVENOUS CONTINUOUS PRN
Status: DISCONTINUED | OUTPATIENT
Start: 2019-10-02 | End: 2019-10-02

## 2019-10-02 RX ORDER — FENTANYL CITRATE 50 UG/ML
25-50 INJECTION, SOLUTION INTRAMUSCULAR; INTRAVENOUS
Status: DISCONTINUED | OUTPATIENT
Start: 2019-10-02 | End: 2019-10-02 | Stop reason: HOSPADM

## 2019-10-02 RX ORDER — ASPIRIN 81 MG
100 TABLET, DELAYED RELEASE (ENTERIC COATED) ORAL DAILY
Qty: 30 TABLET | Refills: 0 | Status: SHIPPED | OUTPATIENT
Start: 2019-10-02 | End: 2020-01-07

## 2019-10-02 RX ORDER — FENTANYL CITRATE 50 UG/ML
INJECTION, SOLUTION INTRAMUSCULAR; INTRAVENOUS PRN
Status: DISCONTINUED | OUTPATIENT
Start: 2019-10-02 | End: 2019-10-02

## 2019-10-02 RX ORDER — ONDANSETRON 2 MG/ML
4 INJECTION INTRAMUSCULAR; INTRAVENOUS EVERY 30 MIN PRN
Status: DISCONTINUED | OUTPATIENT
Start: 2019-10-02 | End: 2019-10-02 | Stop reason: HOSPADM

## 2019-10-02 RX ORDER — DEXAMETHASONE SODIUM PHOSPHATE 4 MG/ML
INJECTION, SOLUTION INTRA-ARTICULAR; INTRALESIONAL; INTRAMUSCULAR; INTRAVENOUS; SOFT TISSUE PRN
Status: DISCONTINUED | OUTPATIENT
Start: 2019-10-02 | End: 2019-10-02

## 2019-10-02 RX ORDER — ATROPA BELLADONNA AND OPIUM 16.2; 3 MG/1; MG/1
SUPPOSITORY RECTAL PRN
Status: DISCONTINUED | OUTPATIENT
Start: 2019-10-02 | End: 2019-10-02 | Stop reason: HOSPADM

## 2019-10-02 RX ORDER — CEFAZOLIN SODIUM 2 G/100ML
2 INJECTION, SOLUTION INTRAVENOUS
Status: COMPLETED | OUTPATIENT
Start: 2019-10-02 | End: 2019-10-02

## 2019-10-02 RX ORDER — LIDOCAINE HYDROCHLORIDE 20 MG/ML
INJECTION, SOLUTION INFILTRATION; PERINEURAL PRN
Status: DISCONTINUED | OUTPATIENT
Start: 2019-10-02 | End: 2019-10-02

## 2019-10-02 RX ORDER — PROPOFOL 10 MG/ML
INJECTION, EMULSION INTRAVENOUS CONTINUOUS PRN
Status: DISCONTINUED | OUTPATIENT
Start: 2019-10-02 | End: 2019-10-02

## 2019-10-02 RX ORDER — ONDANSETRON 2 MG/ML
INJECTION INTRAMUSCULAR; INTRAVENOUS PRN
Status: DISCONTINUED | OUTPATIENT
Start: 2019-10-02 | End: 2019-10-02

## 2019-10-02 RX ORDER — PROPOFOL 10 MG/ML
INJECTION, EMULSION INTRAVENOUS PRN
Status: DISCONTINUED | OUTPATIENT
Start: 2019-10-02 | End: 2019-10-02

## 2019-10-02 RX ORDER — NALOXONE HYDROCHLORIDE 0.4 MG/ML
.1-.4 INJECTION, SOLUTION INTRAMUSCULAR; INTRAVENOUS; SUBCUTANEOUS
Status: DISCONTINUED | OUTPATIENT
Start: 2019-10-02 | End: 2019-10-02 | Stop reason: HOSPADM

## 2019-10-02 RX ORDER — MEPERIDINE HYDROCHLORIDE 25 MG/ML
12.5 INJECTION INTRAMUSCULAR; INTRAVENOUS; SUBCUTANEOUS
Status: DISCONTINUED | OUTPATIENT
Start: 2019-10-02 | End: 2019-10-02 | Stop reason: HOSPADM

## 2019-10-02 RX ORDER — CEFAZOLIN SODIUM 1 G/3ML
1 INJECTION, POWDER, FOR SOLUTION INTRAMUSCULAR; INTRAVENOUS SEE ADMIN INSTRUCTIONS
Status: DISCONTINUED | OUTPATIENT
Start: 2019-10-02 | End: 2019-10-02 | Stop reason: HOSPADM

## 2019-10-02 RX ORDER — FUROSEMIDE 10 MG/ML
INJECTION INTRAMUSCULAR; INTRAVENOUS PRN
Status: DISCONTINUED | OUTPATIENT
Start: 2019-10-02 | End: 2019-10-02

## 2019-10-02 RX ADMIN — PHENYLEPHRINE HYDROCHLORIDE 100 MCG: 10 INJECTION INTRAVENOUS at 12:00

## 2019-10-02 RX ADMIN — PHENYLEPHRINE HYDROCHLORIDE 100 MCG: 10 INJECTION INTRAVENOUS at 12:10

## 2019-10-02 RX ADMIN — PROPOFOL 150 MG: 10 INJECTION, EMULSION INTRAVENOUS at 10:56

## 2019-10-02 RX ADMIN — PHENYLEPHRINE HYDROCHLORIDE 100 MCG: 10 INJECTION INTRAVENOUS at 12:18

## 2019-10-02 RX ADMIN — PHENYLEPHRINE HYDROCHLORIDE 100 MCG: 10 INJECTION INTRAVENOUS at 12:06

## 2019-10-02 RX ADMIN — DEXAMETHASONE SODIUM PHOSPHATE 4 MG: 4 INJECTION, SOLUTION INTRA-ARTICULAR; INTRALESIONAL; INTRAMUSCULAR; INTRAVENOUS; SOFT TISSUE at 10:56

## 2019-10-02 RX ADMIN — FENTANYL CITRATE 25 MCG: 50 INJECTION, SOLUTION INTRAMUSCULAR; INTRAVENOUS at 11:29

## 2019-10-02 RX ADMIN — PHENYLEPHRINE HYDROCHLORIDE 100 MCG: 10 INJECTION INTRAVENOUS at 12:13

## 2019-10-02 RX ADMIN — FENTANYL CITRATE 25 MCG: 50 INJECTION, SOLUTION INTRAMUSCULAR; INTRAVENOUS at 11:09

## 2019-10-02 RX ADMIN — SODIUM CHLORIDE: 9 INJECTION, SOLUTION INTRAVENOUS at 12:34

## 2019-10-02 RX ADMIN — FENTANYL CITRATE 50 MCG: 50 INJECTION, SOLUTION INTRAMUSCULAR; INTRAVENOUS at 10:56

## 2019-10-02 RX ADMIN — SODIUM CHLORIDE: 9 INJECTION, SOLUTION INTRAVENOUS at 10:54

## 2019-10-02 RX ADMIN — PHENYLEPHRINE HYDROCHLORIDE 100 MCG: 10 INJECTION INTRAVENOUS at 11:56

## 2019-10-02 RX ADMIN — PROPOFOL 100 MCG/KG/MIN: 10 INJECTION, EMULSION INTRAVENOUS at 10:56

## 2019-10-02 RX ADMIN — LIDOCAINE HYDROCHLORIDE 100 MG: 20 INJECTION, SOLUTION INFILTRATION; PERINEURAL at 10:56

## 2019-10-02 RX ADMIN — PHENYLEPHRINE HYDROCHLORIDE 100 MCG: 10 INJECTION INTRAVENOUS at 12:29

## 2019-10-02 RX ADMIN — PHENYLEPHRINE HYDROCHLORIDE 100 MCG: 10 INJECTION INTRAVENOUS at 12:23

## 2019-10-02 RX ADMIN — ONDANSETRON 4 MG: 2 INJECTION INTRAMUSCULAR; INTRAVENOUS at 10:56

## 2019-10-02 RX ADMIN — MIDAZOLAM 2 MG: 1 INJECTION INTRAMUSCULAR; INTRAVENOUS at 10:56

## 2019-10-02 RX ADMIN — CEFAZOLIN SODIUM 2 G: 2 INJECTION, SOLUTION INTRAVENOUS at 11:03

## 2019-10-02 RX ADMIN — SODIUM CHLORIDE, POTASSIUM CHLORIDE, SODIUM LACTATE AND CALCIUM CHLORIDE: 600; 310; 30; 20 INJECTION, SOLUTION INTRAVENOUS at 12:34

## 2019-10-02 RX ADMIN — PHENYLEPHRINE HYDROCHLORIDE 100 MCG: 10 INJECTION INTRAVENOUS at 11:24

## 2019-10-02 RX ADMIN — FUROSEMIDE 20 MG: 10 INJECTION, SOLUTION INTRAVENOUS at 12:36

## 2019-10-02 ASSESSMENT — ENCOUNTER SYMPTOMS
DYSRHYTHMIAS: 0
SEIZURES: 0

## 2019-10-02 ASSESSMENT — LIFESTYLE VARIABLES: TOBACCO_USE: 0

## 2019-10-02 ASSESSMENT — MIFFLIN-ST. JEOR: SCORE: 1743.76

## 2019-10-02 ASSESSMENT — COPD QUESTIONNAIRES: COPD: 0

## 2019-10-02 NOTE — OR NURSING
Dr. Resendiz at bedside to speak with patient.  Boyle traction to be released at 1348.  Catheter can be removed tomorrow, either at home or in the office.

## 2019-10-02 NOTE — OR NURSING
Wife at bedside for newton cath cares and removal of cath cares- verbalized understanding, denies questions- good clean technique observed. PNDS met, po per I&O sheet. Pt dressed, up in recliner and transported to Phase 2.

## 2019-10-02 NOTE — OP NOTE
OPERATIVE REPORT  DATE OF SURGERY: 10/02/19  LOCATION OF SURGERY: [facility] OR  PREOPERATIVE DIAGNOSIS:  (N40.1) Benign non-nodular prostatic hyperplasia with lower urinary tract symptoms  (primary encounter diagnosis)  POSTOPERATIVE DIAGNOSIS: (N40.1) Benign non-nodular prostatic hyperplasia with lower urinary tract symptoms  (primary encounter diagnosis)  START TIME: 11:19 AM  END TIME: 12:36 PM  PROCEDURE PERFORMED:   1. Cystoscopy and urethral dilation   2. Greenlight photovaporization of the prostate (PVP)      STAFF SURGEON: Oscar Resendiz MD  ANESTHESIA: General.   ESTIMATED BLOOD LOSS: 5 mL.   DRAINS AND TUBES: 22fr coude-tip 3-way catheter with 25cc in the balloon   COMPLICATIONS: None.   DISPOSITION: PACU.   SPECIMENS OBTAINED: None  SIGNIFICANT FINDINGS: Tight meatus which required sequential dilation prior to cystoscope insertion. Dilated to 28fr. Cystoscopy with evidence of significant trilobar prostatic hypertrophy with a large median lobe distorting the bladder neck with moderate trabeculation.      HISTORY OF PRESENT ILLNESS: Mr. Pope is a 69 year old man with BPH with LUTS who failed medical management. Office cystoscopy with evidence of obstructive lateral lobes with a large median lobe. He was counseled on treatment options and elected to proceed with the above surgery.      OPERATION PERFORMED:   Informed consent was obtained and the patient was brought to the operating room where general anesthesia was induced. The patient was given appropriate preoperative antibiotics and positioned supine. The patient was then repositioned in dorsal lithotomy with all pressure points padded. We then performed a timeout, verifying the correct patient's site and procedure to be performed.    The urethral meatus was noted to be tight and required sequential dilation to 28fr prior to scope insertion. A 26 Thai continuous flow scope was inserted atraumatically into the bladder.  Cystoscopy was performed which  revealed significant tri-lobar prostatic hypertrophy with a large median lobe, bilateral UOs in normal position, and moderate trabeculation of the bladder.  Vaporization was started at the bladder neck and the median lobe which was taken down entirety and the bladder neck was opened. Vaporization was then carried back to the level of the verumontanum care to avoid vaporization distal to this point to ensure an intact external sphincter.  Lateral lobes were well vaporized to the capsule.  Hemostasis was excellent.  Total laser energy - 265,224 Joules. Total laser time - 36 min 16 sec. Scope was removed and a 22 Niuean three-way coudé tip catheter was placed with 25 cc in the balloon.  This was placed on gentle traction.  A B/O suppository was administered and he received 20 mg of Lasix.    He was emerged from anesthesia and taken to the PACU in stable condition.       Oscar Resendiz MD   Urology  [facility]  Clinic Phone [phone]

## 2019-10-02 NOTE — DISCHARGE INSTRUCTIONS
POSTOPERATIVE INSTRUCTIONS    Diagnosis-------------------------------   BPH with LUTS    Procedure-------------------------------  Procedure(s) (LRB):  CYSTOSCOPY, VAPORIZATION, PROSTATE, PHOTOSELECTIVE, USING [address] LASER (N/A)  CYSTOSCOPY (N/A)      Findings--------------------------------  Tight meatus which required sequential dilation prior to cystoscope insertion. Dilated to 28fr. Cystoscopy with evidence of significant trilobar prostatic hypertrophy with a large median lobe distorting the bladder neck with moderate trabeculation.     Home-going instructions-----------------  BALDWIN CATHETER  - You may remove your catheter at home tomorrow if your urine is clear. If your urine is pink then wait another day  - To remove, cut off the purple port. Some water will drain so have a wash-cloth ready  - After the water has drained, pull gently on the catheter and it will slide out         Activity Limitation:     - No driving or operating heavy machinery while on narcotic pain medication.     FOLLOW THESE INSTRUCTIONS AS INDICATED BELOW:  - Observe operative area for signs of excessive bleeding.  - You may shower.  - Increase fluid intake to promote clear urine.  - Resume usual diet as tolerated    What to expect while recovering-----------    For the first few weeks after your procedure, you may notice that your urine is cloudy. Or you may have blood or blood clots in your urine. This is normal while your body rids itself of the treated tissue. These symptoms may begin to get better during the first few weeks. But it may take a few months before they go away. Your provider can tell you when you can have sex again and when you can go back to work.    You also may be told to avoid lifting anything over 10 pounds or bending over to lift things from the ground.    You should drink plenty of fluids to help flush out your bladder.    You may experience some intermittent bleeding that makes your urine  pink or cherry colored. This is normal.    However, if you are unable to urinate, passing large amount of clots, have daniel blood in your urine, or have a temperature >101 degrees, call the urology nurse on call, or present to your nearest emergency department.      When to call your healthcare provider  Contact your healthcare provider right away if:    You have a fever of 100.4 F (38 C) or higher, or as directed by your provider    You have excessive bleeding    You have pain not relieved by medicines    You notice that no urine is draining from the catheter or the catheter falls out    You have a frequent or very strong urge to urinate    You re not able to urinate, or notice a decrease in urine flow    Discharge Medications/instructions:     - Take Tylenol 1000mg every 6 hours for pain    - Take an over the counter stool softener to promote soft bowel movements    - Resume your Asprin in 1 week if your urine remains clear    - Continue your tamsulosin 0.4mg (Flomax) and Dutasteride until you finish your current supply but do not refill      Questions/concerns------------------------  [facility]: [phone]  [facility]: [phone]    Future appointments  You will follow up with Dr. Resendiz in 3 months at his [place] office.      Oscar Resendiz MD       You may remove the newton catheter tomorrow yourself at home- call Dr Raygoza clinic with any questions or concerns.    Same Day Surgery Discharge Instructions for  Sedation and General Anesthesia       It's not unusual to feel dizzy, light-headed or faint for up to 24 hours after surgery or while taking pain medication.  If you have these symptoms: sit for a few minutes before standing and have someone assist you when you get up to walk or use the bathroom.      You should rest and relax for the next 24 hours. We recommend you make arrangements to have an adult stay with you for at least 24 hours after your discharge.   Avoid hazardous and strenuous activity.      DO NOT DRIVE any vehicle or operate mechanical equipment for 24 hours following the end of your surgery.  Even though you may feel normal, your reactions may be affected by the medication you have received.      Do not drink alcoholic beverages for 24 hours following surgery.       Slowly progress to your regular diet as you feel able. It's not unusual to feel nauseated and/or vomit after receiving anesthesia.  If you develop these symptoms, drink clear liquids (apple juice, ginger ale, broth, 7-up, etc. ) until you feel better.  If your nausea and vomiting persists for 24 hours, please notify your surgeon.        All narcotic pain medications, along with inactivity and anesthesia, can cause constipation. Drinking plenty of liquids and increasing fiber intake will help.      For any questions of a medical nature, call your surgeon.      Do not make important decisions for 24 hours.      If you had general anesthesia, you may have a sore throat for a couple of days related to the breathing tube used during surgery.  You may use Cepacol lozenges to help with this discomfort.  If it worsens or if you develop a fever, contact your surgeon.       If you feel your pain is not well managed with the pain medications prescribed by your surgeon, please contact your surgeon's office to let them know so they can address your concerns.     DISCHARGE INSTRUCTIONS FOR   CATHETER CARE AT HOME      .      Basic Catheter Care  1. Always wash hands before and after handling your catheter.  2. Use soap and water to wash the area around your catheter.  3. Do this procedure twice a day.  4. Proper cleansing will help keep the area from becoming irritated or infected.    Leg Bag  This is a small plastic bag that collects urine draining from your catheter and then strapped around your thigh. It will need to be emptied when the bag is 1/2 to 3/4 full.     Large Drainage Bag  1. This bag is larger  than the leg bag and holds more urine.  It is to be used while at home, especially at night.    2. Before you go to bed, change the leg bag to the large drainage bag.  3. Pinch off the catheter with your fingers and swab the connection between the catheter and leg bag with alcohol sponge.  4. Disconnect the leg bag and connect the large drainage bag to your catheter.  5. When you get into bed, arrange the drainage tubing so that it doesn t kink.  6. Be sure to keep the bag below the level of your bladder and allow enough slack for turning.    Cleaning Your Drainage Bags    1. Wash hands.  2. Using funnel or syringe, fill the bag half full with a solution of 1/2  vinegar and 1/2 water.  3. Shake bag, allowing mixture to cleanse inside of bag.  4. Empty out all vinegar and water mixture from your bag.  5. Hang bag to dry when not in use.  6. Clean your bags anytime you change them.      Helpful Hints  1. Always keep drainage bags below bladder level to insure adequate drainage.  2. Drink 4-6 glasses of water daily along with other fluids you normally drink to keep urine free of infection and / or clots.  3. If you notice no urine in your bag for 2 to 4 hours or you develop extreme discomfort in bladder area, your catheter maybe plugged.  Notify your doctor.  4. If you notice your urine becomes foul smelling and cloudy, notify your doctor.  Also notify your doctor if you develop fever or chills.  5. If you notice urine leaking around the outside of the catheter, check to be sure catheter or tubing is not kinked.  6. Don t use leg bag while in bed.          HOW TO REMOVE YOUR CATHETER INSTRUCTIONS    1. Wash your hands.    2. Insert the syringe into balloon port of the catheter.    3. Withdraw all the fluid from the balloon.  You may have to re-insert the syringe into the port additional times until no more fluid can be withdrawn.    4. Pull gently on the catheter.  If no resistance, slowly withdraw.    5. Dispose of  the catheter and the syringe.    6. Wash your hands.    7. Call your doctor if unable to urinate within 6-8 hours, or when uncomfortable.       **If you have questions or concerns about your procedure,   call Dr. Resendiz at [phone]**

## 2019-10-02 NOTE — ANESTHESIA PREPROCEDURE EVALUATION
Anesthesia Pre-Procedure Evaluation    Patient: Driss Pope   MRN: [id number] : 1950          Preoperative Diagnosis: BPH WITH LOWER URINARY TRACT SYMPTOMS    Procedure(s):  CYSTOSCOPY, VAPORIZATION, PROSTATE, PHOTOSELECTIVE, USING 532 NANOMETER 80 WATT KTP LASER  CYSTOSCOPY    Past Medical History:   Diagnosis Date     CKD (chronic kidney disease)      Hypertension goal BP (blood pressure) < 130/80      Impingement syndrome of both shoulders 3/29/2019     Mixed dyslipidemia 2019     Obesity      Osteoarthritis      Past Surgical History:   Procedure Laterality Date     ARTHROSCOPY KNEE RT/LT      Left knee     COLONOSCOPY  10/4/2011    Procedure:COLONOSCOPY; Colonoscopy, screening; Surgeon:HANNAH COMER; Location:[facility]     COLONOSCOPY  10/4/2011    Procedure:COMBINED COLONOSCOPY, REMOVE TUMOR/POLYP/LESION BY SNARE; Surgeon:HANNAH COMER; Location:[facility]     COLONOSCOPY N/A 2016    Procedure: COMBINED COLONOSCOPY, SINGLE OR MULTIPLE BIOPSY/POLYPECTOMY BY BIOPSY;  Surgeon: Duane, William Charles, MD;  Location: [facility]     COLONOSCOPY WITH CO2 INSUFFLATION N/A 2016    Procedure: COLONOSCOPY WITH CO2 INSUFFLATION;  Surgeon: Duane, William Charles, MD;  Location: [facility]     COLONOSCOPY WITH CO2 INSUFFLATION N/A 3/15/2017    Procedure: COLONOSCOPY WITH CO2 INSUFFLATION;  Surgeon: Duane, William Charles, MD;  Location: [facility]     JOINT REPLACEMENT, HIP RT/LT      Joint Replacement Hip RT/LT-both replced in the last 5 years     TONSILLECTOMY         Anesthesia Evaluation     . Pt has had prior anesthetic.     No history of anesthetic complications          ROS/MED HX    ENT/Pulmonary:      (-) tobacco use, asthma, COPD and sleep apnea   Neurologic:      (-) seizures, CVA and migraines   Cardiovascular:     (+) Dyslipidemia, hypertension----. : . . . :. .      (-) arrhythmias, irregular heartbeat/palpitations and valvular problems/murmurs   METS/Exercise Tolerance:     Hematologic:        "  Musculoskeletal:   (+) arthritis,  -       GI/Hepatic:         Renal/Genitourinary:     (+) chronic renal disease, type: CRI, BPH,       Endo:     (+) Obesity, .      Psychiatric:         Infectious Disease:         Malignancy:         Other:                          Physical Exam  Normal systems: cardiovascular, pulmonary and dental    Airway   Mallampati: III  TM distance: >3 FB  Neck ROM: full    Dental     Cardiovascular       Pulmonary             Lab Results   Component Value Date    WBC 7.9 05/30/2019    HGB 13.3 05/30/2019    HCT 40.4 05/30/2019     05/30/2019    SED 9 09/04/2018     09/03/2019    POTASSIUM 4.3 09/03/2019    CHLORIDE 110 (H) 09/03/2019    CO2 31 09/03/2019    BUN 23 09/03/2019    CR 1.13 09/03/2019    GLC 98 09/03/2019    RAH 9.3 09/03/2019    PHOS 2.8 09/03/2019    ALBUMIN 3.8 09/03/2019    PROTTOTAL 6.8 03/26/2018    ALT 24 03/26/2018    AST 18 03/26/2018    ALKPHOS 74 03/26/2018    BILITOTAL 0.5 03/26/2018    TSH 1.74 04/29/2011       Preop Vitals  BP Readings from Last 3 Encounters:   10/02/19 (!) 163/88   09/30/19 126/74   09/12/19 133/82    Pulse Readings from Last 3 Encounters:   09/30/19 64   09/12/19 61   09/03/19 72      Resp Readings from Last 3 Encounters:   10/02/19 20   05/30/19 18   09/20/18 13    SpO2 Readings from Last 3 Encounters:   10/02/19 97%   09/30/19 96%   09/12/19 96%      Temp Readings from Last 1 Encounters:   10/02/19 36.1  C (97  F) (Oral)    Ht Readings from Last 1 Encounters:   10/02/19 1.702 m (5' 7\")      Wt Readings from Last 1 Encounters:   10/02/19 102.5 kg (226 lb)    Estimated body mass index is 35.4 kg/m  as calculated from the following:    Height as of this encounter: 1.702 m (5' 7\").    Weight as of this encounter: 102.5 kg (226 lb).       Anesthesia Plan      History & Physical Review  History and physical reviewed and following examination; no interval change.    ASA Status:  3 .    NPO Status:  > 8 hours    Plan for General and LMA " with Intravenous and Propofol induction. Maintenance will be Balanced.    PONV prophylaxis:  Ondansetron (or other 5HT-3) and Dexamethasone or Solumedrol       Postoperative Care  Postoperative pain management:  IV analgesics and Multi-modal analgesia.      Consents  Anesthetic plan, risks, benefits and alternatives discussed with:  Patient..                 Reggie Yadav DO

## 2019-10-02 NOTE — ANESTHESIA POSTPROCEDURE EVALUATION
Patient: Driss Pope    Procedure(s):  CYSTOSCOPY, VAPORIZATION, PROSTATE, PHOTOSELECTIVE, USING 532 N[address]OM[address] 80 WATT KTP LASER  CYSTOSCOPY    Diagnosis:BPH WITH LOWER URINARY TRACT SYMPTOMS  Diagnosis Additional Information: No value filed.    Anesthesia Type:  General, LMA    Note:  Anesthesia Post Evaluation    Patient location during evaluation: PACU  Patient participation: Able to fully participate in evaluation  Level of consciousness: awake and alert  Pain management: adequate  Airway patency: patent  Cardiovascular status: acceptable and hemodynamically stable  Respiratory status: acceptable and unassisted  Hydration status: acceptable  PONV: none             Last vitals:  Vitals:    10/02/19 0931 10/02/19 1248 10/02/19 1300   BP: (!) 163/88 139/85 (!) 151/88   Pulse:  58 57   Resp: 20 16 12   Temp: 36.1  C (97  F) 36.4  C (97.5  F) 35.5  C (95.9  F)   SpO2: 97% 100% 100%         Electronically Signed By: Reggie Yadav DO  October 2, 2019  1:33 PM

## 2019-10-02 NOTE — ANESTHESIA CARE TRANSFER NOTE
Patient: Driss Pope    Procedure(s):  CYSTOSCOPY, VAPORIZATION, PROSTATE, PHOTOSELECTIVE, USING 532 N[address] 80 WATT KTP LASER  CYSTOSCOPY    Diagnosis: BPH WITH LOWER URINARY TRACT SYMPTOMS  Diagnosis Additional Information: No value filed.    Anesthesia Type:   General, LMA     Note:  Airway :Face Mask  Patient transferred to:PACU  Comments: At end of procedure, spontaneous respirations, adequate tidal volumes, followed commands to voice, LMA removed atraumatically, oropharynx suctioned, airway patent after LMA removal. Oxygen via facemask at 10 liters per minute to PACU. Oxygen tubing connected to wall O2 in PACU, SpO2, NiBP, and EKG monitors and alarms on and functioning, Lazarus Hugger warmer connected to patient gown, report on patient's clinical status given to PACU RN, RN questions answered.Handoff Report: Identifed the Patient, Identified the Reponsible Provider, Reviewed the pertinent medical history, Discussed the surgical course, Reviewed Intra-OP anesthesia mangement and issues during anesthesia, Set expectations for post-procedure period and Allowed opportunity for questions and acknowledgement of understanding      Vitals: (Last set prior to Anesthesia Care Transfer)    CRNA VITALS  10/2/2019 1214 - 10/2/2019 1251      10/2/2019             Resp Rate (observed):  16    EKG:  Sinus rhythm                Electronically Signed By: JOSUE Chrisetnsen CRNA  October 2, 2019  12:51 PM

## 2019-10-03 ENCOUNTER — TELEPHONE (OUTPATIENT)
Dept: UROLOGY | Facility: CLINIC | Age: 69
End: 2019-10-03

## 2019-10-03 ENCOUNTER — NURSE TRIAGE (OUTPATIENT)
Dept: NURSING | Facility: CLINIC | Age: 69
End: 2019-10-03

## 2019-10-03 NOTE — TELEPHONE ENCOUNTER
"Called and spoke to patient for follow up after 10/2/19 greenlight photovaporization procedure with Dr. Resendiz. Patient reports that he is doing well and stated, \"I have some burning when I go but that is to be expected for a few days they said.\" patient reports that he is voiding without difficulty, is eating and drinking okay, and denies fevers and chills. Informed patient to call with any questions or concerns.    3 month follow up appointment scheduled for 1/7/19 at 10:00am with Dr. Resendiz at [facility].     Karin Mcclure RN, BSN    "

## 2019-10-03 NOTE — TELEPHONE ENCOUNTER
Health Call Center    Phone Message    May a detailed message be left on voicemail: yes    Reason for Call: Other: pt had surgery 10/2/19 and removed his catheter today and has questions about his urine having some blood in it since. [facility] message sent, no response. Transferred pt to triage nurse line. Please advise      Action Taken: Message routed to:  Adult Clinics: Urology p [phone]

## 2019-10-03 NOTE — TELEPHONE ENCOUNTER
"Clinic Action Needed:Yes, please call patient   Reason for Call:Patient reporting he removed cath at home today. Reporting last 2 voids have been bloody. Denies difficulty with urine stream. Blood tinged urine with clots \"stringy.\" Afebrile.    Reviewed per [facility]  Blood in your urine. It's normal to see blood right after surgery. But contact your doctor if the blood in your urine is thick like ketchup, bleeding appears to be worsening or your urine flow is blocked. Blood clots can block urine flow.    Patient denies thick urine. Stating he was advised it would be uncomfortable to urinate the next few days.    Caller verbalized understanding. Denies further questions.    Carol Santos RN  [facility]        "

## 2019-10-03 NOTE — TELEPHONE ENCOUNTER
"Clinic Action Needed:Yes, please call patient   Reason for Call:Patient reporting he removed cath at home today. Reporting last 2 voids have been bloody. Blood tinged urine with clots \"stringy.\"   Afebrile.  Reviewed per [facility]  Blood in your urine. It's normal to see blood right after surgery. But contact your doctor if the blood in your urine is thick like ketchup, bleeding appears to be worsening or your urine flow is blocked. Blood clots can block urine flow.    Patient denies thick urine. Stating he was advised it would be uncomfortable to urinate the next few days.    Caller verbalized understanding. Denies further questions.    Carol Santos RN  [facility]        Reason for Disposition    [1] Follow-up call from patient regarding patient's clinical status AND [2] information urgent    Additional Information    Negative: Lab calling with strep throat test results and triager can call in prescription    Negative: Lab calling with urinalysis test results and triager can call in prescription    Negative: Medication questions    Negative: ED call to PCP    Negative: Physician call to PCP    Negative: Call about patient who is currently hospitalized    Negative: Lab or radiology calling with CRITICAL test results    Negative: [1] Prescription not at pharmacy AND [2] was prescribed today by PCP    Protocols used: PCP CALL - NO TRIAGE-A-      "

## 2019-10-03 NOTE — TELEPHONE ENCOUNTER
Called and spoke to patient regarding note below. Patient reports that he has voided 3 times since removing his catheter and has noticed small stringly blood clots with urination. Per patient, the clots are decreasing. Informed patient that this can be common and encouraged patient to drink a lot of water to help dilute the urine. Informed patient to call with any changes in symptoms or with any quesitons or concerns. Patient verbalized understanding and was comfortable with plan.    Patient inquiring about weight restrictions post op and for how long. Informed patient that a message will be sent to Dr. Resendiz to review. Patient aware that he will be contacted with recommendations.    Karin Mcclure RN, BSN

## 2019-10-07 NOTE — TELEPHONE ENCOUNTER
Received message from Dr. Resendiz who recommends no lifting greater than 10 pounds for 2 weeks then patient can ease back into activity.     Returned call and spoke to patient who is aware of the above information. Patient verbalized understanding and will call with any questions in the future.    Karin Mcclure RN, BSN

## 2019-10-08 ENCOUNTER — OFFICE VISIT (OUTPATIENT)
Dept: NEPHROLOGY | Facility: CLINIC | Age: 69
End: 2019-10-08
Payer: MEDICARE

## 2019-10-08 VITALS
SYSTOLIC BLOOD PRESSURE: 143 MMHG | WEIGHT: 225 LBS | BODY MASS INDEX: 35.24 KG/M2 | DIASTOLIC BLOOD PRESSURE: 88 MMHG | OXYGEN SATURATION: 95 % | HEART RATE: 64 BPM

## 2019-10-08 DIAGNOSIS — N25.81 SECONDARY RENAL HYPERPARATHYROIDISM (H): ICD-10-CM

## 2019-10-08 DIAGNOSIS — N18.30 CKD (CHRONIC KIDNEY DISEASE) STAGE 3, GFR 30-59 ML/MIN (H): Primary | ICD-10-CM

## 2019-10-08 DIAGNOSIS — I10 HYPERTENSION, GOAL BELOW 140/90: ICD-10-CM

## 2019-10-08 DIAGNOSIS — R60.0 LOWER EXTREMITY EDEMA: ICD-10-CM

## 2019-10-08 DIAGNOSIS — N18.30 CKD (CHRONIC KIDNEY DISEASE) STAGE 3, GFR 30-59 ML/MIN (H): ICD-10-CM

## 2019-10-08 LAB
ALBUMIN SERPL-MCNC: 3.9 G/DL (ref 3.4–5)
ANION GAP SERPL CALCULATED.3IONS-SCNC: 3 MMOL/L (ref 3–14)
BUN SERPL-MCNC: 24 MG/DL (ref 7–30)
CALCIUM SERPL-MCNC: 9.5 MG/DL (ref 8.5–10.1)
CHLORIDE SERPL-SCNC: 110 MMOL/L (ref 94–109)
CO2 SERPL-SCNC: 29 MMOL/L (ref 20–32)
CREAT SERPL-MCNC: 1.21 MG/DL (ref 0.66–1.25)
CREAT UR-MCNC: 105 MG/DL
GFR SERPL CREATININE-BSD FRML MDRD: 60 ML/MIN/{1.73_M2}
GLUCOSE SERPL-MCNC: 103 MG/DL (ref 70–99)
HGB BLD-MCNC: 13.9 G/DL (ref 13.3–17.7)
PHOSPHATE SERPL-MCNC: 2.9 MG/DL (ref 2.5–4.5)
POTASSIUM SERPL-SCNC: 4.3 MMOL/L (ref 3.4–5.3)
PROT UR-MCNC: 0.71 G/L
PROT/CREAT 24H UR: 0.68 G/G CR (ref 0–0.2)
PTH-INTACT SERPL-MCNC: 44 PG/ML (ref 18–80)
SODIUM SERPL-SCNC: 142 MMOL/L (ref 133–144)

## 2019-10-08 PROCEDURE — 80069 RENAL FUNCTION PANEL: CPT | Performed by: INTERNAL MEDICINE

## 2019-10-08 PROCEDURE — 85018 HEMOGLOBIN: CPT | Performed by: INTERNAL MEDICINE

## 2019-10-08 PROCEDURE — 99214 OFFICE O/P EST MOD 30 MIN: CPT | Performed by: INTERNAL MEDICINE

## 2019-10-08 PROCEDURE — 84156 ASSAY OF PROTEIN URINE: CPT | Performed by: INTERNAL MEDICINE

## 2019-10-08 PROCEDURE — 83970 ASSAY OF PARATHORMONE: CPT | Performed by: INTERNAL MEDICINE

## 2019-10-08 PROCEDURE — 36415 COLL VENOUS BLD VENIPUNCTURE: CPT | Performed by: INTERNAL MEDICINE

## 2019-10-08 ASSESSMENT — PAIN SCALES - GENERAL: PAINLEVEL: NO PAIN (0)

## 2019-10-08 NOTE — PATIENT INSTRUCTIONS
1. Goal blood pressure <130/80 - please update if you find you are above this goal.   2. Follow-up in one year

## 2019-10-08 NOTE — NURSING NOTE
Driss Pope's goals for this visit include:   Chief Complaint   Patient presents with     RECHECK     one year recheck CKD       He requests these members of his care team be copied on today's visit information: no    PCP: Adam Soliz    Referring Provider:  No referring provider defined for this encounter.    BP (!) 143/88 (BP Location: Left arm, Patient Position: Sitting, Cuff Size: Adult Large)   Pulse 64   Wt 102.1 kg (225 lb)   SpO2 95%   BMI 35.24 kg/m      Do you need any medication refills at today's visit? No    Shraddha Lira LPN

## 2019-10-08 NOTE — PROGRESS NOTES
10/08/19     CC: Chronic Kidney Disease (CKD)    HPI: Drsis Pope is a 69 year old male who presents for follow-up of stage 3 CKD thought due to hypertension. He is here for his one year follow-up. He continues to be active but has had success with weight loss.  His creatinine has been 1.13-1.37 in the past year and is stable at 1.21 at this time.  He has not had proteinuria.  His blood pressure at home is typically in the 120s over 70.  He has lost 25 pounds since December and I congratulated him on that success.  He continues to be active with the weight watchers program and has lost 50 pounds in total.  In regards to recent medical issues, he had a TURP last Wednesday.  He feels that he is recovering well.  He has some pain with urinating and maybe a little bit of blood at the end of the stream but he reports that he has been updating urology and they are aware.  On review of systems he also comments on shoulder pain it is not using NSAIDs.    Allergies   Allergen Reactions     Lisinopril Cough     Persistent cough with Lisinopril       aspirin 325 MG EC tablet, Take 1 tablet by mouth daily.  atorvastatin (LIPITOR) 20 MG tablet, TAKE 1 TABLET EVERY DAY  docusate sodium (COLACE) 100 MG tablet, Take 1 tablet (100 mg) by mouth daily  dutasteride (AVODART) 0.5 MG capsule, Take 1 capsule (0.5 mg) by mouth daily  Krill Oil 1000 MG CAPS, Take by mouth daily  losartan (COZAAR) 100 MG tablet, Take 1 tablet (100 mg) by mouth daily  Misc Natural Products (GLUCOSAMINE CHONDROITIN ADV PO), 2-3 tablets daily.   Multiple Vitamins-Minerals (MULTIVITAL PO), Once daily.   tamsulosin (FLOMAX) 0.4 MG capsule, TAKE 2 CAPSULES EVERY DAY  CIALIS 20 MG tablet, Take 1/2 to 1 tablet by  mouth daily as needed,  never use with  nitroglycerin, terazosin,  or doxazosin (Patient not taking: Reported on 9/30/2019)    No current facility-administered medications on file prior to visit.       Past Medical History:   Diagnosis Date     CKD  (chronic kidney disease)      Complication of anesthesia      Hypertension goal BP (blood pressure) < 130/80      Impingement syndrome of both shoulders 3/29/2019     Mixed dyslipidemia 9/30/2019     Obesity      Osteoarthritis        Social History     Tobacco Use     Smoking status: Never Smoker     Smokeless tobacco: Never Used   Substance Use Topics     Alcohol use: Yes     Comment: occasionally     Drug use: No         Family History   Problem Relation Age of Onset     Alzheimer Disease Mother      Hypertension Mother      Heart Disease Father         CHF     Alzheimer Disease Paternal Grandmother      Alzheimer Disease Paternal Grandfather      Cancer Brother         esophageal cancer     Prostate Cancer Brother      Prostate Cancer Brother      Diabetes Sister      Unknown/Adopted Son         adopted     Asthma No family hx of      C.A.D. No family hx of      Cerebrovascular Disease No family hx of      Breast Cancer No family hx of      Cancer - colorectal No family hx of      Alcohol/Drug No family hx of      Allergies No family hx of      Anesthesia Reaction No family hx of      Arthritis No family hx of      Blood Disease No family hx of      Cardiovascular No family hx of      Circulatory No family hx of      Congenital Anomalies No family hx of      Connective Tissue Disorder No family hx of      Depression No family hx of      Endocrine Disease No family hx of      Genetic Disorder No family hx of      Eye Disorder No family hx of      Gastrointestinal Disease No family hx of      Genitourinary Problems No family hx of      Gynecology No family hx of      Lipids No family hx of      Musculoskeletal Disorder No family hx of      Neurologic Disorder No family hx of      Obesity No family hx of      Osteoporosis No family hx of      Psychotic Disorder No family hx of      Respiratory No family hx of      Thyroid Disease No family hx of      Family History Negative No family hx of      Hearing Loss No  family hx of      Substance Abuse No family hx of      Mental Illness No family hx of      Anxiety Disorder No family hx of      Hyperlipidemia No family hx of          ROS: A 4 system review of systems was negative other than noted here or above.     Exam:  BP (!) 143/88 (BP Location: Left arm, Patient Position: Sitting, Cuff Size: Adult Large)   Pulse 64   Wt 102.1 kg (225 lb)   SpO2 95%   BMI 35.24 kg/m    GENERAL APPEARANCE: alert and no distress  RESP: lungs clear to auscultation  CV: regular rhythm, normal rate, no rub  EXT: no lower ext edema bilaterally  SKIN: no rash  NEURO: mentation intact and speech normal  PSYCH: affect normal/bright    Orders Only on 10/08/2019   Component Date Value Ref Range Status     Sodium 10/08/2019 142  133 - 144 mmol/L Final     Potassium 10/08/2019 4.3  3.4 - 5.3 mmol/L Final     Chloride 10/08/2019 110* 94 - 109 mmol/L Final     Carbon Dioxide 10/08/2019 29  20 - 32 mmol/L Final     Anion Gap 10/08/2019 3  3 - 14 mmol/L Final     Glucose 10/08/2019 103* 70 - 99 mg/dL Final     Urea Nitrogen 10/08/2019 24  7 - 30 mg/dL Final     Creatinine 10/08/2019 1.21  0.66 - 1.25 mg/dL Final     GFR Estimate 10/08/2019 60* >60 mL/min/[1.73_m2] Final    Comment: Non  GFR Calc  Starting 12/18/2018, serum creatinine based estimated GFR (eGFR) will be   calculated using the Chronic Kidney Disease Epidemiology Collaboration   (CKD-EPI) equation.       GFR Estimate If Black 10/08/2019 70  >60 mL/min/[1.73_m2] Final    Comment:  GFR Calc  Starting 12/18/2018, serum creatinine based estimated GFR (eGFR) will be   calculated using the Chronic Kidney Disease Epidemiology Collaboration   (CKD-EPI) equation.       Calcium 10/08/2019 9.5  8.5 - 10.1 mg/dL Final     Phosphorus 10/08/2019 2.9  2.5 - 4.5 mg/dL Final     Albumin 10/08/2019 3.9  3.4 - 5.0 g/dL Final     Hemoglobin 10/08/2019 13.9  13.3 - 17.7 g/dL Final        Assessment/Plan:  585.3F CKD (chronic kidney  disease) stage 3, GFR 30-59 ml/min  Comment: His CKD is probably related to longstanding hypertension. Kidney function stable and no proteinuria previously. Will plan to follow-up in one year.     Hypertension: At goal of less than 130/80.    Edema: resolved      Patient Instructions   1. Goal blood pressure <130/80 - please update if you find you are above this goal.   2. Follow-up in one year      I did mention at time of discharge, that it would also be reasonable to follow his kidney function through his primary care on a yearly basis with urine assessment as well (urine microalbumin/creatinine) and follow-up here as needed if he wants to minimize his specialty appts. Otherwise, I would be happy to have him back once a year to confirm stability.     Rosalie Fitzgerald, DO

## 2019-10-09 DIAGNOSIS — N18.30 CKD (CHRONIC KIDNEY DISEASE) STAGE 3, GFR 30-59 ML/MIN (H): Primary | ICD-10-CM

## 2019-12-16 ENCOUNTER — TRANSFERRED RECORDS (OUTPATIENT)
Dept: HEALTH INFORMATION MANAGEMENT | Facility: CLINIC | Age: 69
End: 2019-12-16

## 2020-01-07 ENCOUNTER — OFFICE VISIT (OUTPATIENT)
Dept: UROLOGY | Facility: CLINIC | Age: 70
End: 2020-01-07
Payer: MEDICARE

## 2020-01-07 VITALS — DIASTOLIC BLOOD PRESSURE: 83 MMHG | SYSTOLIC BLOOD PRESSURE: 136 MMHG | HEART RATE: 67 BPM

## 2020-01-07 DIAGNOSIS — R39.15 URINARY URGENCY: Primary | ICD-10-CM

## 2020-01-07 PROCEDURE — 99213 OFFICE O/P EST LOW 20 MIN: CPT | Performed by: UROLOGY

## 2020-01-07 RX ORDER — OXYBUTYNIN CHLORIDE 5 MG/1
5 TABLET, EXTENDED RELEASE ORAL DAILY
Qty: 90 TABLET | Refills: 3 | Status: SHIPPED | OUTPATIENT
Start: 2020-01-07 | End: 2021-01-06

## 2020-01-07 ASSESSMENT — PAIN SCALES - GENERAL: PAINLEVEL: NO PAIN (0)

## 2020-01-07 NOTE — PROGRESS NOTES
[facility]   CHIEF COMPLAINT   It was my pleasure to see Driss Pope who is a 69 year old male for follow-up of LUTS.      HPI   Drsis Pope is a very pleasant 69 year old male who presents with a history of LUTS.      9/3/19  Has had frequency for 5-6 years.   LUTS: slow stream, frequency, urgency, urge incontinence - uses diapers when going for a longer drive  Has been on tamsulosin 0.4mg (Flomax) for 5-6 years at [facility]  Started dutaseride 3-4 months ago.   No real changes with dutasteride.  No prior acute urinary retention.      AUASS: 7-0-8-2-5-1-3 = 17  QOL = 4     2 of 5 brothers with prostate cancer. One brother diagnosed in 50-55 and treated with surgery and another in early 60s treated with radiation.    He is now s/p Greenlight photovaporization of the prostate (PVP) on 10/2/19    TODAY:  He notes his stream is much better than before surgery, however he continues to have issues with urgency    AUASS: 5-6-3-5-1-0-2 = 14  QOL = 4  PVR = 35cc    PHYSICAL EXAM  Patient is a 69 year old  male   Vitals: Blood pressure 136/83, pulse 67.  General Appearance Adult: There is no height or weight on file to calculate BMI.  Alert, no acute distress, oriented  HENT: throat/mouth:normal, good dentition  Lungs: no respiratory distress, or pursed lip breathing  Heart: No obvious jugular venous distension present  Abdomen: soft, nontender, no organomegaly or masses  Musculoskeltal: extremities normal, no peripheral edema  Skin: no suspicious lesions or rashes  Neuro: Alert, oriented, speech and mentation normal  Psych: affect and mood normal  Gait: Normal    Component PSA   Latest Ref Rng & Units 0 - 4 ug/L   7/9/2009 2.15   5/26/2010 2.25   4/29/2011 2.55   9/19/2012 5.41 (H)   3/18/2013 2.72   10/29/2014 2.63   12/14/2015 3.73   1/2/2017 3.37   9/3/2019 1.72       ASSESSMENT and PLAN  69 year old man with LUTS now s/p Greenlight photovaporization of the prostate (PVP) on 10/2/19    Urinary urgency  -His urinary  stream has improved following the bladder outlet procedure however he continues to have bothersome urgency and frequency  -He is off of his tamsulosin and finasteride  -We discussed that given the timeframe from surgery it is reasonable to consider the addition of an anticholinergic to decrease his overactive bladder symptoms  -Prescription for Ditropan 5 mg XL sent to his pharmacy.  We discussed the risks and benefits, common side effects, and mechanism of action of this medication  -Follow-up in 3 months    I spent over 15 minutes with the patient.  Over half this time was spent on counseling regarding the above.    Oscar Resendiz MD   Urology  [facility]  Clinic Phone [phone]

## 2020-01-07 NOTE — NURSING NOTE
Driss Pope's goals for this visit include:   Chief Complaint   Patient presents with     Follow Up     photovaporization     PVR; 35mL     He requests these members of his care team be copied on today's visit information: yes    PCP: Adam Soliz    Referring Provider:  No referring provider defined for this encounter.    /83   Pulse 67     Do you need any medication refills at today's visit? No

## 2020-02-03 ENCOUNTER — TELEPHONE (OUTPATIENT)
Dept: PEDIATRICS | Facility: CLINIC | Age: 70
End: 2020-02-03

## 2020-02-03 DIAGNOSIS — N18.30 CKD (CHRONIC KIDNEY DISEASE) STAGE 3, GFR 30-59 ML/MIN (H): ICD-10-CM

## 2020-02-03 DIAGNOSIS — I10 HYPERTENSION, GOAL BELOW 140/90: ICD-10-CM

## 2020-02-03 DIAGNOSIS — E78.5 HYPERLIPIDEMIA LDL GOAL <100: ICD-10-CM

## 2020-02-03 RX ORDER — LOSARTAN POTASSIUM 100 MG/1
100 TABLET ORAL DAILY
Qty: 90 TABLET | Refills: 0 | Status: SHIPPED | OUTPATIENT
Start: 2020-02-03 | End: 2020-04-28

## 2020-02-03 RX ORDER — ATORVASTATIN CALCIUM 20 MG/1
TABLET, FILM COATED ORAL
Qty: 90 TABLET | Refills: 0 | Status: SHIPPED | OUTPATIENT
Start: 2020-02-03 | End: 2020-04-28

## 2020-02-03 NOTE — TELEPHONE ENCOUNTER
[facility] Call [facility]    Phone Message    May a detailed message be left on voicemail: yes    Reason for Call: Other: patient insurance changed and would need all medication orders sent to [facility] which is [employer] mail service. please advise     Action Taken: Message routed to:  Primary Care p [phone]

## 2020-02-03 NOTE — TELEPHONE ENCOUNTER
" Health Call Center    Phone Message    May a detailed message be left on voicemail: yes     Reason for Call: Other: Pt called to schedule a pre-op exam for surgery he's having on 2/11/2020 for his shoulder. Writer scheduled pt for the only availability on 2/10/2020. Please advise pt if he has other options since \"2/10/2020 is cutting it a bit close\"     Action Taken: Message routed to:  Primary Care p [phone]    Travel Screening: Negative                                                                      "

## 2020-02-03 NOTE — TELEPHONE ENCOUNTER
I Have called pt to clarify which medication need to be sent. Pt states he needs Lipitor and losartan sent to the [facility] mail service. Kala WILLIAM,CMA

## 2020-02-07 ENCOUNTER — OFFICE VISIT (OUTPATIENT)
Dept: PEDIATRICS | Facility: CLINIC | Age: 70
End: 2020-02-07
Payer: MEDICARE

## 2020-02-07 VITALS
OXYGEN SATURATION: 96 % | WEIGHT: 239.8 LBS | HEIGHT: 68 IN | SYSTOLIC BLOOD PRESSURE: 130 MMHG | TEMPERATURE: 98 F | HEART RATE: 75 BPM | BODY MASS INDEX: 36.34 KG/M2 | DIASTOLIC BLOOD PRESSURE: 78 MMHG

## 2020-02-07 DIAGNOSIS — E66.01 CLASS 2 SEVERE OBESITY DUE TO EXCESS CALORIES WITH SERIOUS COMORBIDITY AND BODY MASS INDEX (BMI) OF 35.0 TO 35.9 IN ADULT (H): ICD-10-CM

## 2020-02-07 DIAGNOSIS — N18.30 CKD (CHRONIC KIDNEY DISEASE) STAGE 3, GFR 30-59 ML/MIN (H): ICD-10-CM

## 2020-02-07 DIAGNOSIS — M75.102 NONTRAUMATIC TEAR OF LEFT ROTATOR CUFF, UNSPECIFIED TEAR EXTENT: ICD-10-CM

## 2020-02-07 DIAGNOSIS — Z01.818 PREOP GENERAL PHYSICAL EXAM: Primary | ICD-10-CM

## 2020-02-07 DIAGNOSIS — I10 HYPERTENSION, GOAL BELOW 140/90: ICD-10-CM

## 2020-02-07 DIAGNOSIS — E66.812 CLASS 2 SEVERE OBESITY DUE TO EXCESS CALORIES WITH SERIOUS COMORBIDITY AND BODY MASS INDEX (BMI) OF 35.0 TO 35.9 IN ADULT (H): ICD-10-CM

## 2020-02-07 DIAGNOSIS — E78.2 MIXED DYSLIPIDEMIA: ICD-10-CM

## 2020-02-07 PROCEDURE — 99214 OFFICE O/P EST MOD 30 MIN: CPT | Performed by: INTERNAL MEDICINE

## 2020-02-07 ASSESSMENT — MIFFLIN-ST. JEOR: SCORE: 1815.85

## 2020-02-07 NOTE — PROGRESS NOTES
[address] [address]  [phone]  Dept: [phone]    PRE-OP EVALUATION:  Today's date: 2020    Driss Pope (: 1950) presents for pre-operative evaluation assessment as requested by Dr. Rudy Camacho.  He requires evaluation and anesthesia risk assessment prior to undergoing surgery/procedure for treatment of  Left shoulder surgery (torn tendon).    Proposed Surgery/ Procedure: Left Shoulder Surgery  Date of Surgery/ Procedure: 2020  Time of Surgery/ Procedure: 12:00 pm  Hospital/Surgical Facility: [facility]  Fax number for surgical facility: [phone]    Primary Physician: Adam Soliz  Type of Anesthesia Anticipated: General    Patient has a Health Care Directive or Living Will:  YES-Living will    1. NO - Do you have a history of heart attack, stroke, stent, bypass or surgery on an artery in the head, neck, heart or legs?  2. NO - Do you ever have any pain or discomfort in your chest?  3. NO - Do you have a history of  Heart Failure?  4. NO - Are you troubled by shortness of breath when: walking on the level, up a slight hill or at night?  5. NO - Do you currently have a cold, bronchitis or other respiratory infection?  6. NO - Do you have a cough, shortness of breath or wheezing?  7. NO - Do you sometimes get pains in the calves of your legs when you walk?  8. NO - Do you or anyone in your family have previous history of blood clots?  9. NO - Do you or does anyone in your family have a serious bleeding problem such as prolonged bleeding following surgeries or cuts?  10. NO - Have you ever had problems with anemia or been told to take iron pills?  11. NO - Have you had any abnormal blood loss such as black, tarry or bloody stools, or abnormal vaginal bleeding?  12. NO - Have you ever had a blood transfusion? Unknown  13. NO - Have you or any of your relatives ever had problems with anesthesia?  14. NO -  Do you have sleep apnea, excessive snoring or daytime drowsiness?  15. NO - Do you have any prosthetic heart valves?  16. YES - Do you have prosthetic joints? Bilateral hips  17. NO - Is there any chance that you may be pregnant?      HPI:     HPI related to upcoming procedure:   69-year-old gentleman with history of hypertension and dyslipidemia has been having left shoulder pain.  He has been diagnosed to have a rotator cuff tear.  He is to undergo repair.  In September 2019 he underwent surgery for benign prostate hypertrophy with obstruction.  He has done well except having a lot of urinary frequency related to overactive bladder.  He is currently on Ditropan and that seems to work.  Patient denies chest pain, shortness of breath, dizziness or lightheadedness.  His only complaint is left shoulder pain particularly when he reaches back or up.        MEDICAL HISTORY:     Patient Active Problem List    Diagnosis Date Noted     Mixed dyslipidemia 09/30/2019     Priority: Medium     Acute gout 03/29/2019     Priority: Medium     Impingement syndrome of both shoulders 03/29/2019     Priority: Medium     Olecranon bursitis, left elbow 09/07/2018     Priority: Medium     Class 2 severe obesity due to excess calories with serious comorbidity in adult (H) 10/16/2017     Priority: Medium     Hypertension, goal below 140/90 10/11/2016     Priority: Medium     Benign non-nodular prostatic hyperplasia with lower urinary tract symptoms 12/14/2015     Priority: Medium     ED (erectile dysfunction) 06/24/2014     Priority: Medium     Advanced directives, counseling/discussion 09/25/2012     Priority: Medium     Patient states has Advance Directive and will bring in a copy to clinic. 9/25/2012          Abnormal PSA 09/25/2012     Priority: Medium     CKD (chronic kidney disease) stage 3, GFR 30-59 ml/min (H) 03/25/2011     Priority: Medium      Past Medical History:   Diagnosis Date     CKD (chronic kidney disease)       Complication of anesthesia      Hypertension goal BP (blood pressure) < 130/80      Impingement syndrome of both shoulders 3/29/2019     Mixed dyslipidemia 9/30/2019     Obesity      Osteoarthritis      Past Surgical History:   Procedure Laterality Date     ARTHROSCOPY KNEE RT/LT      Left knee     COLONOSCOPY  10/4/2011    Procedure:COLONOSCOPY; Colonoscopy, screening; Surgeon:HANNAH COMER; Location:[facility]     COLONOSCOPY  10/4/2011    Procedure:COMBINED COLONOSCOPY, REMOVE TUMOR/POLYP/LESION BY SNARE; Surgeon:HANNAH COMER; Location:[facility]     COLONOSCOPY N/A 12/9/2016    Procedure: COMBINED COLONOSCOPY, SINGLE OR MULTIPLE BIOPSY/POLYPECTOMY BY BIOPSY;  Surgeon: Duane, William Charles, MD;  Location: [facility]     COLONOSCOPY WITH CO2 INSUFFLATION N/A 12/9/2016    Procedure: COLONOSCOPY WITH CO2 INSUFFLATION;  Surgeon: Duane, William Charles, MD;  Location: [facility]     COLONOSCOPY WITH CO2 INSUFFLATION N/A 3/15/2017    Procedure: COLONOSCOPY WITH CO2 INSUFFLATION;  Surgeon: Duane, William Charles, MD;  Location: [facility] OR     CYSTOSCOPY N/A 10/2/2019    Procedure: CYSTOSCOPY;  Surgeon: Oscar Resendiz MD;  Location: [facility] OR     JOINT REPLACEMENT, HIP RT/LT      Joint Replacement Hip RT/LT-both replced in the last 5 years     LASER KTP GREEN LIGHT PHOTOSELECTIVE VAPORIZATION PROSTATE N/A 10/2/2019    Procedure: CYSTOSCOPY, VAPORIZATION, PROSTATE, PHOTOSELECTIVE, USING 532 NANOMETER 80 WATT KTP LASER;  Surgeon: Oscar Resendiz MD;  Location: [facility] OR     TONSILLECTOMY       Current Outpatient Medications   Medication Sig Dispense Refill     atorvastatin (LIPITOR) 20 MG tablet TAKE 1 TABLET EVERY DAY 90 tablet 0     Krill Oil 1000 MG CAPS Take by mouth daily       losartan (COZAAR) 100 MG tablet Take 1 tablet (100 mg) by mouth daily 90 tablet 0     Misc Natural Products (GLUCOSAMINE CHONDROITIN ADV PO) 2-3 tablets daily.        Multiple Vitamins-Minerals (MULTIVITAL PO) Once daily.        oxybutynin ER (DITROPAN XL) 5 MG  "24 hr tablet Take 1 tablet (5 mg) by mouth daily 90 tablet 3     OTC products: None, except as noted above    Allergies   Allergen Reactions     Lisinopril Cough     Persistent cough with Lisinopril      Latex Allergy: NO    Social History     Tobacco Use     Smoking status: Never Smoker     Smokeless tobacco: Never Used   Substance Use Topics     Alcohol use: Yes     Comment: occasionally     History   Drug Use No       REVIEW OF SYSTEMS:   Constitutional, neuro, ENT, endocrine, pulmonary, cardiac, gastrointestinal, genitourinary, musculoskeletal, integument and psychiatric systems are negative, except as otherwise noted.    EXAM:   /78 (BP Location: Right arm, Patient Position: Sitting, Cuff Size: Adult Large)   Pulse 75   Temp 98  F (36.7  C) (Temporal)   Ht 1.717 m (5' 7.6\")   Wt 108.8 kg (239 lb 12.8 oz)   SpO2 96%   BMI 36.90 kg/m      GENERAL APPEARANCE: healthy, alert and no distress     EYES: EOMI,  PERRL     HENT: ear canals and TM's normal and nose and mouth without ulcers or lesions     NECK: no adenopathy, no asymmetry, masses, or scars and thyroid normal to palpation     RESP: lungs clear to auscultation - no rales, rhonchi or wheezes     CV: regular rates and rhythm, normal S1 S2, no S3 or S4 and no murmur, click or rub     ABDOMEN:  soft, nontender, no HSM or masses and bowel sounds normal     MS: restricted ROM left shoulder     SKIN: no suspicious lesions or rashes     NEURO: Normal strength and tone, sensory exam grossly normal, mentation intact and speech normal     PSYCH: mentation appears normal. and affect normal/bright     LYMPHATICS: No cervical adenopathy    DIAGNOSTICS:   EKG: appears normal, NSR, normal axis, normal intervals, no acute ST/T changes c/w ischemia, no LVH by voltage criteria    Recent Labs   Lab Test 10/08/19  0856 09/03/19  0946 05/30/19  0824 03/29/19  0909  03/26/18  0906  12/14/15  0816   HGB 13.9  --  13.3 13.6   < >  --    < >  --    PLT  --   --  247 207   " < >  --    < >  --     144  --  143   < > 142   < >  --    POTASSIUM 4.3 4.3  --  4.4   < > 4.3   < >  --    CR 1.21 1.13  --  1.20   < > 1.36*   < >  --    A1C  --   --   --   --   --  5.4  --  5.3    < > = values in this interval not displayed.        IMPRESSION:   Diagnosis/reason for consult:   1.  Preop physical examination completed today and is good.  2.  Nontraumatic tear of the left rotator cuff patient to undergo repair.  3.  Essential hypertension under control with losartan.  4.  Mixed dyslipidemia controlled with atorvastatin 20 mg a day.  5.  History of stage III chronic kidney disease very stable with last renal function within normal range.  6.  Obesity class II.    The proposed surgical procedure is considered LOW risk.    REVISED CARDIAC RISK INDEX  The patient has the following serious cardiovascular risks for perioperative complications such as (MI, PE, VFib and 3  AV Block):  No serious cardiac risks  INTERPRETATION: 0 risks: Class I (very low risk - 0.4% complication rate)    The patient has the following additional risks for perioperative complications:  No identified additional risks      ICD-10-CM    1. Preop general physical exam Z01.818    2. Nontraumatic tear of left rotator cuff, unspecified tear extent M75.102    3. Hypertension, goal below 140/90 I10    4. Mixed dyslipidemia E78.2    5. Class 2 severe obesity due to excess calories with serious comorbidity and body mass index (BMI) of 35.0 to 35.9 in adult (H) E66.01     Z68.35    6. CKD (chronic kidney disease) stage 3, GFR 30-59 ml/min (H) N18.3        RECOMMENDATIONS:     Patient is medically optimized to undergo planned surgical procedure.    --Patient is to take all scheduled medications on the day of surgery EXCEPT for modifications listed below.    APPROVAL GIVEN to proceed with proposed procedure, without further diagnostic evaluation       Signed Electronically by: Adam Soliz MD    Copy of this evaluation report is  provided to requesting physician.    [facility] Preop Guidelines    Revised Cardiac Risk Index

## 2020-02-10 ENCOUNTER — TELEPHONE (OUTPATIENT)
Dept: PEDIATRICS | Facility: CLINIC | Age: 70
End: 2020-02-10

## 2020-02-10 NOTE — TELEPHONE ENCOUNTER
[facility] Call Center    Phone Message    May a detailed message be left on voicemail: NA    Reason for Call: [facility] requesting the patients EKG be faxed.  DOS: 02/11/2020.  Please advise.  Thank you.     Fax: [phone]    Action Taken: Message routed to:  Primary Care p [id number]    Travel Screening: Not Applicable

## 2020-02-24 ENCOUNTER — TRANSFERRED RECORDS (OUTPATIENT)
Dept: HEALTH INFORMATION MANAGEMENT | Facility: CLINIC | Age: 70
End: 2020-02-24

## 2020-03-01 ENCOUNTER — HEALTH MAINTENANCE LETTER (OUTPATIENT)
Age: 70
End: 2020-03-01

## 2020-03-04 ENCOUNTER — THERAPY VISIT (OUTPATIENT)
Dept: PHYSICAL THERAPY | Facility: CLINIC | Age: 70
End: 2020-03-04
Payer: MEDICARE

## 2020-03-04 DIAGNOSIS — M25.512 LEFT SHOULDER PAIN, UNSPECIFIED CHRONICITY: ICD-10-CM

## 2020-03-04 DIAGNOSIS — Z47.89 AFTERCARE FOLLOWING SURGERY OF THE MUSCULOSKELETAL SYSTEM: ICD-10-CM

## 2020-03-04 PROCEDURE — 97110 THERAPEUTIC EXERCISES: CPT | Mod: GP | Performed by: PHYSICAL THERAPIST

## 2020-03-04 PROCEDURE — 97140 MANUAL THERAPY 1/> REGIONS: CPT | Mod: GP | Performed by: PHYSICAL THERAPIST

## 2020-03-04 PROCEDURE — 97161 PT EVAL LOW COMPLEX 20 MIN: CPT | Mod: GP | Performed by: PHYSICAL THERAPIST

## 2020-03-04 NOTE — LETTER
[facility] PHYSICAL THERAPY  [address]  [facility] [address]  [phone]    2020    Re: Driss Pope   :   1950  MRN:  [id number]   REFERRING PHYSICIAN:   Rudy Mccallum    [facility] PHYSICAL THERAPY  Date of Initial Evaluation: 3/5/20  Visits:  Rxs Used: 1  Reason for Referral:     Left shoulder pain, unspecified chronicity  Aftercare following surgery of the musculoskeletal system    EVALUATION SUMMARY    [facility] Initial Evaluation  Subjective:  The history is provided by the patient. No  was used.   Patient Health History  Driss Pope being seen for Physical therapy following rotator cuff surgery..   Problem began: 2020.     Problem occurred: No sure when I originally tore the tendon.  Probably late winter 2018 or early last spring.  Aggravated tear by falling in January.     Pain is reported as 1/10 on pain scale.  General health as reported by patient is good.    Pertinent medical history includes: high blood pressure, implanted device, overweight and osteoarthritis.   Red flags:  None as reported by patient.    Medical allergies: other. Other medical allergies details: lisinopril.     Surgeries include:  Other. Other surgery history details: Hip replacement (right & left), torn meniscus repair, TURP prostate surgery (left knee),.      Current medications:  High blood pressure medication, pain medication and other. Other medications details: bladder muscle relaxer, high cholesterol med (atorvastatin).    Current occupation is retired.     Primary job tasks include:  Computer work, driving, lifting/carrying and other.   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                Therapist Generated HPI Evaluation  Problem details: Left RTC repair was completed 20.  Is in a sling for 6 weeks.  PROM for 6 weeks followed with MARCELINA as able..          Type of problem:  Left shoulder.  This is a new condition.  Condition occurred with:  A fall.  Where condition occurred: at home.  Patient reports pain:  Anterior, upper arm and lateral.  Pain is described as aching, sharp and burning and is constant.    Since onset symptoms are unchanged.  Symptoms are exacerbated by certain positions  and relieved by other and ice (OTC medication).  Special tests included:  MRI and x-ray.    Barriers include:  None as reported by patient.                 Objective:       Shoulder Evaluation:  ROM:    PROM:    Flexion:  Left:  58          External Rotation:  Left:  -8        Strength:  not assessed    Palpation:  Palpation assessed shoulder: Scars healing well-no warmth or redness present; moderate brusing present in bicep area; mild tenderness present with palpation.    Assessment/Plan:    Patient is a 69 year old male with left side shoulder complaints.    Patient has the following significant findings with corresponding treatment plan.                  Diagnosis 1:  S/P Left RTC  Pain -  manual therapy, self management and education  Decreased ROM/flexibility - manual therapy and therapeutic exercise  Decreased joint mobility - manual therapy and therapeutic exercise  Decreased strength - therapeutic exercise and therapeutic activities  Impaired muscle performance - neuro re-education  Decreased function - therapeutic activities    Therapy Evaluation Codes:   1) History comprised of:   Personal factors that impact the plan of care:      None.    Comorbidity factors that impact the plan of care are:      None.     Medications impacting care: None.  2) Examination of Body Systems comprised of:   Body structures and functions that impact the plan of care:      Shoulder.   Activity limitations that impact the plan of care are:      Reaching.  3) Clinical presentation characteristics are:   Stable/Uncomplicated.    4) Decision-Making    Low complexity using standardized  patient assessment instrument and/or measureable assessment of functional outcome.  Cumulative Therapy Evaluation is: Low complexity.    Previous and current functional limitations:  (See Goal Flow Sheet for this information)    Short term and Long term goals: (See Goal Flow Sheet for this information)     Communication ability:  Patient appears to be able to clearly communicate and understand verbal and written communication and follow directions correctly.    Treatment Explanation - The following has been discussed with the patient:   RX ordered/plan of care  Anticipated outcomes  Possible risks and side effects    This patient would benefit from PT intervention to resume normal activities.   Rehab potential is good.  Frequency:  2 X week, once daily  Duration:  for 6 weeks tapering to 1 X a week over 6 weeks  Discharge Plan:  Achieve all LTG.  Independent in home treatment program.  Reach maximal therapeutic benefit.    Thank you for your referral.    INQUIRIES  Therapist: Karin Lawson DPT  [facility]  [address]  [facility] [address]  Phone: [phone]  Fax: [phone]

## 2020-03-04 NOTE — LETTER
DEPARTMENT OF HEALTH AND HUMAN SERVICES  CENTERS FOR MEDICARE & MEDICAID SERVICES    PLAN/UPDATED PLAN OF PROGRESS FOR OUTPATIENT REHABILITATION    PATIENTS NAME:  Driss Pope     : 1950    PROVIDER NUMBER:    [id number]    HICN:  [id number]     PROVIDER NAME: [facility] PHYSICAL [facility]AP[facility]    MEDICAL RECORD NUMBER: [id number]     START OF CARE DATE:  SOC Date: 20   TYPE:  PT    PRIMARY/TREATMENT DIAGNOSIS: (Pertinent Medical Diagnosis)     Left shoulder pain, unspecified chronicity  Aftercare following surgery of the musculoskeletal system    VISITS FROM START OF CARE:  Rxs Used: 1     [facility] Initial Evaluation    Subjective:  The history is provided by the patient. No  was used.     Patient Health History  Driss Pope being seen for Physical therapy following rotator cuff surgery..     Problem began: 2020.   Problem occurred: No sure when I originally tore the tendon.  Probably late winter 2018 or early last spring.  Aggravated tear by falling in January.   Pain is reported as 1/10 on pain scale.    General health as reported by patient is good.  Pertinent medical history includes: high blood pressure, implanted device, overweight and osteoarthritis.     Red flags:  None as reported by patient.  Medical allergies: other. Other medical allergies details: lisinopril.     Surgeries include:  Other. Other surgery history details: Hip replacement (right & left), torn meniscus repair, TURP prostate surgery (left knee),.      Current medications:  High blood pressure medication, pain medication and other. Other medications details: bladder muscle relaxer, high cholesterol med (atorvastatin).      Current occupation is retired. Primary job tasks include:  Computer work, driving, lifting/carrying and other. 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              Therapist Generated HPI  Evaluation  Problem details: Left RTC repair was completed 2/11/20.  Is in a sling for 6 weeks.  PROM for 6 weeks followed with MARCELINA as able..         Type of problem:  Left shoulder.    This is a new condition.  Condition occurred with:  A fall.  Where condition occurred: at home.  Patient reports pain:  Anterior, upper arm and lateral.  Pain is described as aching, sharp and burning and is constant.    Since onset symptoms are unchanged.  Symptoms are exacerbated by certain positions  and relieved by other and ice (OTC medication).    Special tests included:  MRI and x-ray.  Barriers include:  None as reported by patient.                 Objective:  Shoulder Evaluation:  ROM:    PROM:    Flexion:  Left:  58        External Rotation:  Left:  -8        Strength:  not assessed    Palpation:  Palpation assessed shoulder: Scars healing well-no warmth or redness present; moderate brusing present in bicep area; mild tenderness present with palpation.    Assessment/Plan:    Patient is a 69 year old male with left side shoulder complaints.    Patient has the following significant findings with corresponding treatment plan.                  Diagnosis 1:  S/P Left RTC  Pain -  manual therapy, self management and education  Decreased ROM/flexibility - manual therapy and therapeutic exercise  Decreased joint mobility - manual therapy and therapeutic exercise  Decreased strength - therapeutic exercise and therapeutic activities  Impaired muscle performance - neuro re-education  Decreased function - therapeutic activities    Therapy Evaluation Codes:   1) History comprised of:   Personal factors that impact the plan of care:      None.    Comorbidity factors that impact the plan of care are:      None.     Medications impacting care: None.  2) Examination of Body Systems comprised of:   Body structures and functions that impact the plan of care:      Shoulder.   Activity limitations that impact the plan of care are:   "    Reaching.  3) Clinical presentation characteristics are:   Stable/Uncomplicated.  4) Decision-Making    Low complexity using standardized patient assessment instrument and/or measureable assessment of functional outcome.  Cumulative Therapy Evaluation is: Low complexity.    Previous and current functional limitations:  (See Goal Flow Sheet for this information)    Short term and Long term goals: (See Goal Flow Sheet for this information)     Communication ability:  Patient appears to be able to clearly communicate and understand verbal and written communication and follow directions correctly.    Treatment Explanation - The following has been discussed with the patient:   RX ordered/plan of care  Anticipated outcomes  Possible risks and side effects    This patient would benefit from PT intervention to resume normal activities.   Rehab potential is good.  Frequency:  2 X week, once daily  Duration:  for 6 weeks tapering to 1 X a week over 6 weeks  Discharge Plan:  Achieve all LTG.  Independent in home treatment program.  Reach maximal therapeutic benefit.    Please refer to the daily flowsheet for treatment today, total treatment time and time spent performing 1:1 timed codes.     Caregiver Signature/Credentials _____________________________ Date ________       Treating Provider: Karin Lawson DPT     I have reviewed and certified the need for these services and plan of treatment while under my care.      PHYSICIAN'S SIGNATURE:   _____________________________________  Date___________                       Rudy Mccallum MD    Certification period:  Beginning of Cert date period: 03/04/20 to  End of Cert period date: 06/01/20     Functional Level Progress Report: Please see attached \"Goal Flow sheet for Functional level.\"    ____X____ Continue Services or       ________ DC Services                Service dates: From  SOC Date: 03/04/20 date to present                         "

## 2020-03-04 NOTE — PROGRESS NOTES
[facility] Initial Evaluation  Subjective:  The history is provided by the patient. No  was used.   Patient Health History  Driss Pope being seen for Physical therapy following rotator cuff surgery..     Problem began: 2/11/2020.   Problem occurred: No sure when I originally tore the tendon.  Probably late winter 2018 or early last spring.  Aggravated tear by falling in January.   Pain is reported as 1/10 on pain scale.  General health as reported by patient is good.  Pertinent medical history includes: high blood pressure, implanted device, overweight and osteoarthritis.   Red flags:  None as reported by patient.  Medical allergies: other. Other medical allergies details: lisinopril.   Surgeries include:  Other. Other surgery history details: Hip replacement (right & left), torn meniscus repair, TURP prostate surgery (left knee),.    Current medications:  High blood pressure medication, pain medication and other. Other medications details: bladder muscle relaxer, high cholesterol med (atorvastatin).    Current occupation is retired.   Primary job tasks include:  Computer work, driving, lifting/carrying and other.   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                Therapist Generated HPI Evaluation  Problem details: Left RTC repair was completed 2/11/20.  Is in a sling for 6 weeks.  PROM for 6 weeks followed with MARCELINA as able..         Type of problem:  Left shoulder.    This is a new condition.  Condition occurred with:  A fall.  Where condition occurred: at home.  Patient reports pain:  Anterior, upper arm and lateral.  Pain is described as aching, sharp and burning and is constant.    Since onset symptoms are unchanged.  Symptoms are exacerbated by certain positions  and relieved by other and ice (OTC medication).  Special tests included:  MRI and x-ray.    Barriers include:  None as reported by patient.                         Objective:  System                   Shoulder Evaluation:  ROM:    PROM:    Flexion:  Left:  58                External Rotation:  Left:  -8                        Strength:  not assessed                          Palpation:  Palpation assessed shoulder: Scars healing well-no warmth or redness present; moderate brusing present in bicep area; mild tenderness present with palpation.                                         General     ROS    Assessment/Plan:    Patient is a 69 year old male with left side shoulder complaints.    Patient has the following significant findings with corresponding treatment plan.                Diagnosis 1:  S/P Left RTC  Pain -  manual therapy, self management and education  Decreased ROM/flexibility - manual therapy and therapeutic exercise  Decreased joint mobility - manual therapy and therapeutic exercise  Decreased strength - therapeutic exercise and therapeutic activities  Impaired muscle performance - neuro re-education  Decreased function - therapeutic activities    Therapy Evaluation Codes:   1) History comprised of:   Personal factors that impact the plan of care:      None.    Comorbidity factors that impact the plan of care are:      None.     Medications impacting care: None.  2) Examination of Body Systems comprised of:   Body structures and functions that impact the plan of care:      Shoulder.   Activity limitations that impact the plan of care are:      Reaching.  3) Clinical presentation characteristics are:   Stable/Uncomplicated.  4) Decision-Making    Low complexity using standardized patient assessment instrument and/or measureable assessment of functional outcome.  Cumulative Therapy Evaluation is: Low complexity.    Previous and current functional limitations:  (See Goal Flow Sheet for this information)    Short term and Long term goals: (See Goal Flow Sheet for this information)     Communication ability:  Patient appears to be able to clearly  communicate and understand verbal and written communication and follow directions correctly.  Treatment Explanation - The following has been discussed with the patient:   RX ordered/plan of care  Anticipated outcomes  Possible risks and side effects  This patient would benefit from PT intervention to resume normal activities.   Rehab potential is good.    Frequency:  2 X week, once daily  Duration:  for 6 weeks tapering to 1 X a week over 6 weeks  Discharge Plan:  Achieve all LTG.  Independent in home treatment program.  Reach maximal therapeutic benefit.    Please refer to the daily flowsheet for treatment today, total treatment time and time spent performing 1:1 timed codes.

## 2020-03-06 ENCOUNTER — THERAPY VISIT (OUTPATIENT)
Dept: PHYSICAL THERAPY | Facility: CLINIC | Age: 70
End: 2020-03-06
Payer: MEDICARE

## 2020-03-06 DIAGNOSIS — M25.512 LEFT SHOULDER PAIN, UNSPECIFIED CHRONICITY: Primary | ICD-10-CM

## 2020-03-06 DIAGNOSIS — Z47.89 AFTERCARE FOLLOWING SURGERY OF THE MUSCULOSKELETAL SYSTEM: ICD-10-CM

## 2020-03-06 PROCEDURE — 97140 MANUAL THERAPY 1/> REGIONS: CPT | Mod: CQ | Performed by: PHYSICAL THERAPY ASSISTANT

## 2020-03-06 PROCEDURE — 97110 THERAPEUTIC EXERCISES: CPT | Mod: CQ | Performed by: PHYSICAL THERAPY ASSISTANT

## 2020-03-10 ENCOUNTER — THERAPY VISIT (OUTPATIENT)
Dept: PHYSICAL THERAPY | Facility: CLINIC | Age: 70
End: 2020-03-10
Payer: MEDICARE

## 2020-03-10 DIAGNOSIS — M25.512 LEFT SHOULDER PAIN, UNSPECIFIED CHRONICITY: ICD-10-CM

## 2020-03-10 DIAGNOSIS — Z47.89 AFTERCARE FOLLOWING SURGERY OF THE MUSCULOSKELETAL SYSTEM: ICD-10-CM

## 2020-03-10 PROCEDURE — 97110 THERAPEUTIC EXERCISES: CPT | Mod: GP | Performed by: PHYSICAL THERAPIST

## 2020-03-10 PROCEDURE — 97140 MANUAL THERAPY 1/> REGIONS: CPT | Mod: GP | Performed by: PHYSICAL THERAPIST

## 2020-03-17 ENCOUNTER — THERAPY VISIT (OUTPATIENT)
Dept: PHYSICAL THERAPY | Facility: CLINIC | Age: 70
End: 2020-03-17
Payer: MEDICARE

## 2020-03-17 DIAGNOSIS — Z47.89 AFTERCARE FOLLOWING SURGERY OF THE MUSCULOSKELETAL SYSTEM: ICD-10-CM

## 2020-03-17 DIAGNOSIS — M25.512 LEFT SHOULDER PAIN, UNSPECIFIED CHRONICITY: ICD-10-CM

## 2020-03-17 PROCEDURE — 97110 THERAPEUTIC EXERCISES: CPT | Mod: GP | Performed by: PHYSICAL THERAPIST

## 2020-03-17 PROCEDURE — 97140 MANUAL THERAPY 1/> REGIONS: CPT | Mod: GP | Performed by: PHYSICAL THERAPIST

## 2020-03-24 ENCOUNTER — THERAPY VISIT (OUTPATIENT)
Dept: PHYSICAL THERAPY | Facility: CLINIC | Age: 70
End: 2020-03-24
Payer: MEDICARE

## 2020-03-24 DIAGNOSIS — M25.512 LEFT SHOULDER PAIN, UNSPECIFIED CHRONICITY: ICD-10-CM

## 2020-03-24 DIAGNOSIS — Z47.89 AFTERCARE FOLLOWING SURGERY OF THE MUSCULOSKELETAL SYSTEM: ICD-10-CM

## 2020-03-24 PROCEDURE — 97110 THERAPEUTIC EXERCISES: CPT | Mod: GP | Performed by: PHYSICAL THERAPIST

## 2020-04-01 ENCOUNTER — THERAPY VISIT (OUTPATIENT)
Dept: PHYSICAL THERAPY | Facility: CLINIC | Age: 70
End: 2020-04-01
Payer: MEDICARE

## 2020-04-01 DIAGNOSIS — M25.512 LEFT SHOULDER PAIN, UNSPECIFIED CHRONICITY: ICD-10-CM

## 2020-04-01 DIAGNOSIS — Z47.89 AFTERCARE FOLLOWING SURGERY OF THE MUSCULOSKELETAL SYSTEM: ICD-10-CM

## 2020-04-01 PROCEDURE — 97110 THERAPEUTIC EXERCISES: CPT | Mod: GP | Performed by: PHYSICAL THERAPIST

## 2020-04-01 PROCEDURE — 97140 MANUAL THERAPY 1/> REGIONS: CPT | Mod: GP | Performed by: PHYSICAL THERAPIST

## 2020-04-07 ENCOUNTER — THERAPY VISIT (OUTPATIENT)
Dept: PHYSICAL THERAPY | Facility: CLINIC | Age: 70
End: 2020-04-07
Payer: MEDICARE

## 2020-04-07 DIAGNOSIS — Z47.89 AFTERCARE FOLLOWING SURGERY OF THE MUSCULOSKELETAL SYSTEM: ICD-10-CM

## 2020-04-07 DIAGNOSIS — M25.512 LEFT SHOULDER PAIN, UNSPECIFIED CHRONICITY: ICD-10-CM

## 2020-04-07 PROCEDURE — 97140 MANUAL THERAPY 1/> REGIONS: CPT | Mod: GP | Performed by: PHYSICAL THERAPIST

## 2020-04-07 PROCEDURE — 97110 THERAPEUTIC EXERCISES: CPT | Mod: GP | Performed by: PHYSICAL THERAPIST

## 2020-04-09 ENCOUNTER — OFFICE VISIT (OUTPATIENT)
Dept: UROLOGY | Facility: CLINIC | Age: 70
End: 2020-04-09
Payer: MEDICARE

## 2020-04-09 VITALS
HEART RATE: 96 BPM | TEMPERATURE: 98 F | RESPIRATION RATE: 22 BRPM | OXYGEN SATURATION: 94 % | DIASTOLIC BLOOD PRESSURE: 83 MMHG | SYSTOLIC BLOOD PRESSURE: 139 MMHG

## 2020-04-09 DIAGNOSIS — Z90.79 S/P TURP: Primary | ICD-10-CM

## 2020-04-09 DIAGNOSIS — R39.15 URINARY URGENCY: ICD-10-CM

## 2020-04-09 PROCEDURE — 99213 OFFICE O/P EST LOW 20 MIN: CPT | Performed by: UROLOGY

## 2020-04-09 ASSESSMENT — PAIN SCALES - GENERAL: PAINLEVEL: MILD PAIN (2)

## 2020-04-09 NOTE — NURSING NOTE
Driss Pope's goals for this visit include:   Chief Complaint   Patient presents with     RECHECK     3 mo follow up after greenlight procedure       He requests these members of his care team be copied on today's visit information: yes, PCP    PCP: Adam Soliz    Referring Provider:  No referring provider defined for this encounter.    /83 (BP Location: Left arm, Cuff Size: Adult Large)   Pulse 96   Temp 98  F (36.7  C) (Oral)   Resp 22   SpO2 94%     Do you need any medication refills at today's visit? None    post void residual: 14mL    Karin Mcclure RN, BSN

## 2020-04-09 NOTE — Clinical Note
Anton Soliz,    I saw Gary today for follow up. He is doing very well with the addition of oxybutynin 5mg XL (Ditropan XL) and he is very happy with is urinary status. I plan to see him back in 1 year for follow up.    Thanks,   Oscar Resnediz M.D.  Cell: [phone]

## 2020-04-09 NOTE — PROGRESS NOTES
[place]   CHIEF COMPLAINT   It was my pleasure to see Driss Pope who is a 69 year old male for follow-up of LUTS.      HPI   Driss Pope is a very pleasant 69 year old male who presents with a history of LUTS.      9/3/19  Has had frequency for 5-6 years.   LUTS: slow stream, frequency, urgency, urge incontinence - uses diapers when going for a longer drive  Has been on tamsulosin 0.4mg (Flomax) for 5-6 years at ats  Started dutaseride 3-4 months ago.   No real changes with dutasteride.  No prior acute urinary retention.      AUASS: 9-5-8-2-5-1-3 = 17  QOL = 4     2 of 5 brothers with prostate cancer. One brother diagnosed in 50-55 and treated with surgery and another in early 60s treated with radiation.    He is now s/p Greenlight photovaporization of the prostate (PVP) on 10/2/19    1/7/20:  He notes his stream is much better than before surgery, however he continues to have issues with urgency    AUASS: 4-2-1-5-1-0-2 = 14  QOL = 4  PVR = 35cc    4/9/20:  He is doing very well today  Reports only very occasional episodes of increased frequency. These do not happen every day and no clear related events. Notes that when he would go to the movies he would need to go 3-4 times - he drinks water and has popcorn with this.     AUASS: 4-6-7-1-1-0-1 = 5  QOL = 2  PVR = 14cc     PHYSICAL EXAM  Patient is a 69 year old  male   Vitals: Blood pressure 139/83, pulse 96, temperature 98  F (36.7  C), temperature source Oral, resp. rate 22, SpO2 94 %.  General Appearance Adult: There is no height or weight on file to calculate BMI.  Alert, no acute distress, oriented  HENT: throat/mouth:normal, good dentition  Lungs: no respiratory distress, or pursed lip breathing  Heart: No obvious jugular venous distension present  Abdomen: soft, nontender, no organomegaly or masses  Musculoskeltal: extremities normal, no peripheral edema  Skin: no suspicious lesions or rashes  Neuro: Alert, oriented, speech and mentation  normal  Psych: affect and mood normal  Gait: Normal    Component PSA   Latest Ref Rng & Units 0 - 4 ug/L   7/9/2009 2.15   5/26/2010 2.25   4/29/2011 2.55   9/19/2012 5.41 (H)   3/18/2013 2.72   10/29/2014 2.63   12/14/2015 3.73   1/2/2017 3.37   9/3/2019 1.72       ASSESSMENT and PLAN  69 year old man with LUTS now s/p Greenlight photovaporization of the prostate (PVP) on 10/2/19    Urinary urgency  - This has significantly improved with the addition of oxybutynin 5mg XL (Ditropan XL)   - We discussed that he should continue on this once daily  - We discussed the impact of salt intake on urine production and that he could try limiting this during times like movies to see if this changes his frequency  - F/U in 1 year    I spent over 15 minutes with the patient.  Over half this time was spent on counseling regarding the above.    Oscar Resendiz MD   Urology  [facility]  Clinic Phone [phone]

## 2020-04-14 ENCOUNTER — THERAPY VISIT (OUTPATIENT)
Dept: PHYSICAL THERAPY | Facility: CLINIC | Age: 70
End: 2020-04-14
Payer: MEDICARE

## 2020-04-14 DIAGNOSIS — Z47.89 AFTERCARE FOLLOWING SURGERY OF THE MUSCULOSKELETAL SYSTEM: ICD-10-CM

## 2020-04-14 DIAGNOSIS — M25.512 LEFT SHOULDER PAIN, UNSPECIFIED CHRONICITY: ICD-10-CM

## 2020-04-14 PROCEDURE — 97110 THERAPEUTIC EXERCISES: CPT | Mod: GP | Performed by: PHYSICAL THERAPIST

## 2020-04-21 ENCOUNTER — THERAPY VISIT (OUTPATIENT)
Dept: PHYSICAL THERAPY | Facility: CLINIC | Age: 70
End: 2020-04-21
Payer: MEDICARE

## 2020-04-21 DIAGNOSIS — Z47.89 AFTERCARE FOLLOWING SURGERY OF THE MUSCULOSKELETAL SYSTEM: ICD-10-CM

## 2020-04-21 DIAGNOSIS — M25.512 LEFT SHOULDER PAIN, UNSPECIFIED CHRONICITY: ICD-10-CM

## 2020-04-21 PROCEDURE — 97140 MANUAL THERAPY 1/> REGIONS: CPT | Mod: GP | Performed by: PHYSICAL THERAPIST

## 2020-04-21 PROCEDURE — 97110 THERAPEUTIC EXERCISES: CPT | Mod: GP | Performed by: PHYSICAL THERAPIST

## 2020-04-28 ENCOUNTER — THERAPY VISIT (OUTPATIENT)
Dept: PHYSICAL THERAPY | Facility: CLINIC | Age: 70
End: 2020-04-28
Payer: MEDICARE

## 2020-04-28 DIAGNOSIS — Z47.89 AFTERCARE FOLLOWING SURGERY OF THE MUSCULOSKELETAL SYSTEM: ICD-10-CM

## 2020-04-28 DIAGNOSIS — E78.2 MIXED DYSLIPIDEMIA: ICD-10-CM

## 2020-04-28 DIAGNOSIS — N18.30 CKD (CHRONIC KIDNEY DISEASE) STAGE 3, GFR 30-59 ML/MIN (H): ICD-10-CM

## 2020-04-28 DIAGNOSIS — M25.512 LEFT SHOULDER PAIN, UNSPECIFIED CHRONICITY: ICD-10-CM

## 2020-04-28 DIAGNOSIS — I10 HYPERTENSION, GOAL BELOW 140/90: Primary | ICD-10-CM

## 2020-04-28 PROCEDURE — 97112 NEUROMUSCULAR REEDUCATION: CPT | Mod: GP | Performed by: PHYSICAL THERAPIST

## 2020-04-28 PROCEDURE — 97110 THERAPEUTIC EXERCISES: CPT | Mod: GP | Performed by: PHYSICAL THERAPIST

## 2020-05-05 ENCOUNTER — THERAPY VISIT (OUTPATIENT)
Dept: PHYSICAL THERAPY | Facility: CLINIC | Age: 70
End: 2020-05-05
Payer: MEDICARE

## 2020-05-05 DIAGNOSIS — M25.512 LEFT SHOULDER PAIN, UNSPECIFIED CHRONICITY: ICD-10-CM

## 2020-05-05 DIAGNOSIS — Z47.89 AFTERCARE FOLLOWING SURGERY OF THE MUSCULOSKELETAL SYSTEM: ICD-10-CM

## 2020-05-05 PROCEDURE — 97112 NEUROMUSCULAR REEDUCATION: CPT | Mod: GP | Performed by: PHYSICAL THERAPIST

## 2020-05-05 PROCEDURE — 97110 THERAPEUTIC EXERCISES: CPT | Mod: GP | Performed by: PHYSICAL THERAPIST

## 2020-05-12 ENCOUNTER — THERAPY VISIT (OUTPATIENT)
Dept: PHYSICAL THERAPY | Facility: CLINIC | Age: 70
End: 2020-05-12
Payer: MEDICARE

## 2020-05-12 DIAGNOSIS — Z47.89 AFTERCARE FOLLOWING SURGERY OF THE MUSCULOSKELETAL SYSTEM: ICD-10-CM

## 2020-05-12 DIAGNOSIS — M25.512 LEFT SHOULDER PAIN, UNSPECIFIED CHRONICITY: ICD-10-CM

## 2020-05-12 PROCEDURE — 97110 THERAPEUTIC EXERCISES: CPT | Mod: GP | Performed by: PHYSICAL THERAPIST

## 2020-05-12 PROCEDURE — 97112 NEUROMUSCULAR REEDUCATION: CPT | Mod: GP | Performed by: PHYSICAL THERAPIST

## 2020-05-12 NOTE — LETTER
[facility] PHYSICAL THERAPY  [address]  [place] [address]  [phone]    May 12, 2020    Re: Driss Pope   :   1950  MRN:  [id number]   REFERRING PHYSICIAN:   Rudy Mccallum    [facility] PHYSICAL THERAPY    Date of Initial Evaluation:  2020  Visits:  Rxs Used: 12  Reason for Referral:     Left shoulder pain, unspecified chronicity  Aftercare following surgery of the musculoskeletal system    PROGRESS  REPORT    Progress reporting period is from 3/4/20 to 20.       SUBJECTIVE  Subjective changes noted by patient: Patient rpeorts the shoulder continues to progress.  There is soreness if he overuses the shoulder but feels this only lasts for a few days.  There is still weakness present that limits his function throughout the day.    Current pain level is 1/10.     Previous pain level was 5/10.   Changes in function:  Yes (See Goal flowsheet attached for changes in current functional level)  Adverse reaction to treatment or activity: None    OBJECTIVE  Changes noted in objective findings:  Yes,     AROM   Flexion 164   Abduction 155   Extension 64   IR L1   ER 62     STRENGTH   Flexion 4/5   Abuduction 3+/5  IR 4/5   ER 4-/5     ASSESSMENT/PLAN  Updated problem list and treatment plan: Diagnosis 1:  S/P Left Shoulder RTC repair  Pain -  self management and education  Decreased ROM/flexibility - therapeutic exercise  Re: Driss Pope   :   1950    Decreased strength - therapeutic exercise and therapeutic activities  Impaired muscle performance - neuro re-education  Decreased function - therapeutic activities  STG/LTGs have been met or progress has been made towards goals:  Yes (See Goal flow sheet completed today.)  Assessment of Progress: The patient's condition is improving.  Self Management Plans:  Patient has been instructed in a home treatment program.  Patient  has been instructed in self management of symptoms.  I have  re-evaluated this patient and find that the nature, scope, duration and intensity of the therapy is appropriate for the medical condition of the patient.  Driss continues to require the following intervention to meet STG and LTG's:  PT    Recommendations:  This patient would benefit from continued therapy.     Frequency:  2 X a month, once daily  Duration:  for 3 months    Thank you for your referral.    INQUIRIES  Therapist: Karin Lawson DPT, cert. MDT   [facility] PHYSICAL THERAPY  [address]  [facility] [address]  Phone: [phone]  Fax: [phone]

## 2020-05-12 NOTE — PROGRESS NOTES
Subjective:  HPI  Physical Exam                    Objective:  System    Physical Exam    General     ROS    Assessment/Plan:    PROGRESS  REPORT    Progress reporting period is from 3/4/20 to 5/12/20.       SUBJECTIVE  Subjective changes noted by patient: Patient rpeorts the shoulder continues to progress.  There is soreness if he overuses the shoulder but feels this only lasts for a few days.  There is still weakness present that limits his function throughout the day.    Current pain level is 1/10.     Previous pain level was 5/10.   Changes in function:  Yes (See Goal flowsheet attached for changes in current functional level)  Adverse reaction to treatment or activity: None    OBJECTIVE  Changes noted in objective findings:  Yes,     AROM   Flexion 164   Abduction 155   Extension 64   IR L1   ER 62     STRENGTH   Flexion 4/5   Abuduction 3+/5  IR 4/5   ER 4-/5     ASSESSMENT/PLAN  Updated problem list and treatment plan: Diagnosis 1:  S/P Left Shoulder RTC repair  Pain -  self management and education  Decreased ROM/flexibility - therapeutic exercise  Decreased strength - therapeutic exercise and therapeutic activities  Impaired muscle performance - neuro re-education  Decreased function - therapeutic activities  STG/LTGs have been met or progress has been made towards goals:  Yes (See Goal flow sheet completed today.)  Assessment of Progress: The patient's condition is improving.  Self Management Plans:  Patient has been instructed in a home treatment program.  Patient  has been instructed in self management of symptoms.  I have re-evaluated this patient and find that the nature, scope, duration and intensity of the therapy is appropriate for the medical condition of the patient.  Driss continues to require the following intervention to meet STG and LTG's:  PT    Recommendations:  This patient would benefit from continued therapy.     Frequency:  2 X a month, once daily  Duration:  for 3 months        Please  refer to the daily flowsheet for treatment today, total treatment time and time spent performing 1:1 timed codes.

## 2020-05-26 ENCOUNTER — THERAPY VISIT (OUTPATIENT)
Dept: PHYSICAL THERAPY | Facility: CLINIC | Age: 70
End: 2020-05-26
Payer: MEDICARE

## 2020-05-26 DIAGNOSIS — Z47.89 AFTERCARE FOLLOWING SURGERY OF THE MUSCULOSKELETAL SYSTEM: ICD-10-CM

## 2020-05-26 DIAGNOSIS — M25.512 LEFT SHOULDER PAIN, UNSPECIFIED CHRONICITY: ICD-10-CM

## 2020-05-26 PROCEDURE — 97110 THERAPEUTIC EXERCISES: CPT | Mod: GP | Performed by: PHYSICAL THERAPIST

## 2020-05-26 PROCEDURE — 97112 NEUROMUSCULAR REEDUCATION: CPT | Mod: GP | Performed by: PHYSICAL THERAPIST

## 2020-06-19 ENCOUNTER — OFFICE VISIT (OUTPATIENT)
Dept: PEDIATRICS | Facility: CLINIC | Age: 70
End: 2020-06-19
Payer: MEDICARE

## 2020-06-19 VITALS
TEMPERATURE: 98.3 F | WEIGHT: 247.1 LBS | DIASTOLIC BLOOD PRESSURE: 82 MMHG | OXYGEN SATURATION: 95 % | SYSTOLIC BLOOD PRESSURE: 138 MMHG | HEIGHT: 68 IN | BODY MASS INDEX: 37.45 KG/M2 | HEART RATE: 86 BPM

## 2020-06-19 DIAGNOSIS — E78.2 MIXED DYSLIPIDEMIA: ICD-10-CM

## 2020-06-19 DIAGNOSIS — I10 HYPERTENSION, GOAL BELOW 140/90: ICD-10-CM

## 2020-06-19 DIAGNOSIS — N18.30 CKD (CHRONIC KIDNEY DISEASE) STAGE 3, GFR 30-59 ML/MIN (H): ICD-10-CM

## 2020-06-19 DIAGNOSIS — R73.01 IFG (IMPAIRED FASTING GLUCOSE): ICD-10-CM

## 2020-06-19 DIAGNOSIS — E66.01 CLASS 2 SEVERE OBESITY DUE TO EXCESS CALORIES WITH SERIOUS COMORBIDITY IN ADULT, UNSPECIFIED BMI (H): ICD-10-CM

## 2020-06-19 DIAGNOSIS — E66.812 CLASS 2 SEVERE OBESITY DUE TO EXCESS CALORIES WITH SERIOUS COMORBIDITY IN ADULT, UNSPECIFIED BMI (H): ICD-10-CM

## 2020-06-19 DIAGNOSIS — I10 HYPERTENSION, GOAL BELOW 140/90: Primary | ICD-10-CM

## 2020-06-19 LAB
ALBUMIN UR-MCNC: NEGATIVE MG/DL
ANION GAP SERPL CALCULATED.3IONS-SCNC: 5 MMOL/L (ref 3–14)
APPEARANCE UR: CLEAR
BILIRUB UR QL STRIP: NEGATIVE
BUN SERPL-MCNC: 20 MG/DL (ref 7–30)
CALCIUM SERPL-MCNC: 8.9 MG/DL (ref 8.5–10.1)
CHLORIDE SERPL-SCNC: 111 MMOL/L (ref 94–109)
CHOLEST SERPL-MCNC: 167 MG/DL
CO2 SERPL-SCNC: 26 MMOL/L (ref 20–32)
COLOR UR AUTO: YELLOW
CREAT SERPL-MCNC: 1.15 MG/DL (ref 0.66–1.25)
CREAT UR-MCNC: 129 MG/DL
GFR SERPL CREATININE-BSD FRML MDRD: 64 ML/MIN/{1.73_M2}
GLUCOSE SERPL-MCNC: 102 MG/DL (ref 70–99)
GLUCOSE UR STRIP-MCNC: NEGATIVE MG/DL
HDLC SERPL-MCNC: 50 MG/DL
HGB UR QL STRIP: NEGATIVE
KETONES UR STRIP-MCNC: NEGATIVE MG/DL
LDLC SERPL CALC-MCNC: 83 MG/DL
LEUKOCYTE ESTERASE UR QL STRIP: NEGATIVE
MICROALBUMIN UR-MCNC: 15 MG/L
MICROALBUMIN/CREAT UR: 11.32 MG/G CR (ref 0–17)
NITRATE UR QL: NEGATIVE
NONHDLC SERPL-MCNC: 117 MG/DL
PH UR STRIP: 5 PH (ref 5–7)
POTASSIUM SERPL-SCNC: 4.2 MMOL/L (ref 3.4–5.3)
PROT UR-MCNC: 0.08 G/L
PROT/CREAT 24H UR: 0.06 G/G CR (ref 0–0.2)
SODIUM SERPL-SCNC: 142 MMOL/L (ref 133–144)
SOURCE: NORMAL
SP GR UR STRIP: 1.01 (ref 1–1.03)
TRIGL SERPL-MCNC: 169 MG/DL
UROBILINOGEN UR STRIP-MCNC: NORMAL MG/DL (ref 0–2)

## 2020-06-19 PROCEDURE — 81003 URINALYSIS AUTO W/O SCOPE: CPT | Performed by: INTERNAL MEDICINE

## 2020-06-19 PROCEDURE — 80061 LIPID PANEL: CPT | Performed by: INTERNAL MEDICINE

## 2020-06-19 PROCEDURE — 36415 COLL VENOUS BLD VENIPUNCTURE: CPT | Performed by: INTERNAL MEDICINE

## 2020-06-19 PROCEDURE — 84156 ASSAY OF PROTEIN URINE: CPT | Performed by: INTERNAL MEDICINE

## 2020-06-19 PROCEDURE — 80048 BASIC METABOLIC PNL TOTAL CA: CPT | Performed by: INTERNAL MEDICINE

## 2020-06-19 PROCEDURE — 82043 UR ALBUMIN QUANTITATIVE: CPT | Performed by: INTERNAL MEDICINE

## 2020-06-19 PROCEDURE — 99214 OFFICE O/P EST MOD 30 MIN: CPT | Performed by: INTERNAL MEDICINE

## 2020-06-19 RX ORDER — ATORVASTATIN CALCIUM 20 MG/1
TABLET, FILM COATED ORAL
Qty: 90 TABLET | Refills: 3 | Status: SHIPPED | OUTPATIENT
Start: 2020-06-19 | End: 2021-07-28

## 2020-06-19 RX ORDER — LOSARTAN POTASSIUM 100 MG/1
100 TABLET ORAL DAILY
Qty: 90 TABLET | Refills: 3 | Status: SHIPPED | OUTPATIENT
Start: 2020-06-19 | End: 2021-07-28

## 2020-06-19 ASSESSMENT — MIFFLIN-ST. JEOR: SCORE: 1843.96

## 2020-06-19 NOTE — PROGRESS NOTES
Subjective     Driss Pope is a 70 year old male who presents to clinic today for the following health issues:    HPI   Hyperlipidemia Follow-Up/Review lab work      Are you regularly taking any medication or supplement to lower your cholesterol?   Yes- atorvastatin 20 mg    Are you having muscle aches or other side effects that you think could be caused by your cholesterol lowering medication?  No    Hypertension Follow-up      Do you check your blood pressure regularly outside of the clinic? Yes     Are you following a low salt diet? No but no added salt    Are your blood pressures ever more than 140 on the top number (systolic) OR more   than 90 on the bottom number (diastolic), for example 140/90? No 130's/mid 80's      How many servings of fruits and vegetables do you eat daily?  3-4    On average, how many sweetened beverages do you drink each day (Examples: soda, juice, sweet tea, etc.  Do NOT count diet or artificially sweetened beverages)?   0    How many days per week do you exercise enough to make your heart beat faster? 4 days and staying active outside    How many minutes a day do you exercise enough to make your heart beat faster? 20 - 29    How many days per week do you miss taking your medication? 0    Patient comes in for follow-up regarding hypertension chronic kidney disease mixed dyslipidemia impaired fasting glucose etc.  He has been doing well.  He denies chest pain or shortness of breath.  He like a refill of his medication.  Denies chest pain, shortness of breath dizziness or lightheadedness.  Takes his medication as prescribed.    Patient Active Problem List   Diagnosis     CKD (chronic kidney disease) stage 3, GFR 30-59 ml/min (H)     Advanced directives, counseling/discussion     Abnormal PSA     ED (erectile dysfunction)     Benign non-nodular prostatic hyperplasia with lower urinary tract symptoms     Hypertension, goal below 140/90     Class 2 severe obesity due to excess calories  with serious comorbidity in adult (H)     Olecranon bursitis, left elbow     Acute gout     Impingement syndrome of both shoulders     Mixed dyslipidemia     Shoulder pain, left     Aftercare following surgery of the musculoskeletal system     Past Surgical History:   Procedure Laterality Date     ARTHROSCOPY KNEE RT/LT      Left knee     COLONOSCOPY  10/4/2011    Procedure:COLONOSCOPY; Colonoscopy, screening; Surgeon:HANNAH COMER; Location:[facility] OR     COLONOSCOPY  10/4/2011    Procedure:COMBINED COLONOSCOPY, REMOVE TUMOR/POLYP/LESION BY SNARE; Surgeon:HANNAH COMER; Location:[facility] OR     COLONOSCOPY N/A 12/9/2016    Procedure: COMBINED COLONOSCOPY, SINGLE OR MULTIPLE BIOPSY/POLYPECTOMY BY BIOPSY;  Surgeon: Duane, William Charles, MD;  Location: [facility] OR     COLONOSCOPY WITH CO2 INSUFFLATION N/A 12/9/2016    Procedure: COLONOSCOPY WITH CO2 INSUFFLATION;  Surgeon: Duane, William Charles, MD;  Location: [facility] OR     COLONOSCOPY WITH CO2 INSUFFLATION N/A 3/15/2017    Procedure: COLONOSCOPY WITH CO2 INSUFFLATION;  Surgeon: Duane, William Charles, MD;  Location: [facility] OR     CYSTOSCOPY N/A 10/2/2019    Procedure: CYSTOSCOPY;  Surgeon: Oscar Resendiz MD;  Location: [facility] OR     JOINT REPLACEMENT, HIP RT/LT      Joint Replacement Hip RT/LT-both replced in the last 5 years     LASER KTP GREEN LIGHT PHOTOSELECTIVE VAPORIZATION PROSTATE N/A 10/2/2019    Procedure: CYSTOSCOPY, VAPORIZATION, PROSTATE, PHOTOSELECTIVE, USING 532 NANOMETER 80 WATT KTP LASER;  Surgeon: Oscar Resendiz MD;  Location: [facility] OR     TONSILLECTOMY         Social History     Tobacco Use     Smoking status: Never Smoker     Smokeless tobacco: Never Used   Substance Use Topics     Alcohol use: Yes     Comment: occasionally     Family History   Problem Relation Age of Onset     Alzheimer Disease Mother      Hypertension Mother      Heart Disease Father         CHF     Alzheimer Disease Paternal Grandmother      Alzheimer Disease Paternal Grandfather      Cancer  "Brother         esophageal cancer     Prostate Cancer Brother      Prostate Cancer Brother      Diabetes Sister      Unknown/Adopted Son         adopted     Asthma No family hx of      C.A.D. No family hx of      Cerebrovascular Disease No family hx of      Breast Cancer No family hx of      Cancer - colorectal No family hx of      Alcohol/Drug No family hx of      Allergies No family hx of      Anesthesia Reaction No family hx of      Arthritis No family hx of      Blood Disease No family hx of      Cardiovascular No family hx of      Circulatory No family hx of      Congenital Anomalies No family hx of      Connective Tissue Disorder No family hx of      Depression No family hx of      Endocrine Disease No family hx of      Genetic Disorder No family hx of      Eye Disorder No family hx of      Gastrointestinal Disease No family hx of      Genitourinary Problems No family hx of      Gynecology No family hx of      Lipids No family hx of      Musculoskeletal Disorder No family hx of      Neurologic Disorder No family hx of      Obesity No family hx of      Osteoporosis No family hx of      Psychotic Disorder No family hx of      Respiratory No family hx of      Thyroid Disease No family hx of      Family History Negative No family hx of      Hearing Loss No family hx of      Substance Abuse No family hx of      Mental Illness No family hx of      Anxiety Disorder No family hx of      Hyperlipidemia No family hx of          Current Outpatient Medications   Medication Sig Dispense Refill     atorvastatin (LIPITOR) 20 MG tablet TAKE 1 TABLET DAILY 90 tablet 0     Krill Oil 1000 MG CAPS Take by mouth daily 750 mg to 1000 mg daily       losartan (COZAAR) 100 MG tablet TAKE 1 TABLET DAILY 90 tablet 0     Misc Natural Products (GLUCOSAMINE CHONDROITIN ADV PO) 2-3 tablets daily \"Move free pills\"       Multiple Vitamins-Minerals (MULTIVITAL PO) Once daily.        oxybutynin ER (DITROPAN XL) 5 MG 24 hr tablet Take 1 tablet " (5 mg) by mouth daily 90 tablet 3     Allergies   Allergen Reactions     Lisinopril Cough     Persistent cough with Lisinopril     Recent Labs   Lab Test 06/19/20  0813 10/08/19  0856  03/29/19  0909  03/26/18  0906  01/02/17  0910  06/30/16  0814  12/14/15  0816  06/15/15  0842   A1C  --   --   --   --   --  5.4  --   --   --   --   --  5.3  --  5.9   LDL 83  --   --  63  --  72  --  92  --  62  --   --    < > 79   HDL 50  --   --  54  --  50  --  67  --  73  --   --    < > 51   TRIG 169*  --   --  84  --  212*  --  125  --  63  --   --    < > 129   ALT  --   --   --   --   --  24  --  36  --  32  --  37  --  38   CR 1.15 1.21   < > 1.20   < > 1.36*   < > 1.51*   < > 1.19   < >  --    < > 1.44*   GFRESTIMATED 64 60*   < > 61   < > 52*   < > 46*   < > 61   < >  --    < > 49*   GFRESTBLACK 74 70   < > 71   < > 63   < > 56*   < > 74   < >  --    < > 59*   POTASSIUM 4.2 4.3   < > 4.4   < > 4.3   < > 4.5   < > 4.1   < >  --    < > 4.7    < > = values in this interval not displayed.      BP Readings from Last 3 Encounters:   06/19/20 138/82   04/09/20 139/83   02/07/20 130/78    Wt Readings from Last 3 Encounters:   06/19/20 112.1 kg (247 lb 1.6 oz)   02/07/20 108.8 kg (239 lb 12.8 oz)   10/08/19 102.1 kg (225 lb)                      Reviewed and updated as needed this visit by Provider         Review of Systems   Constitutional, HEENT, cardiovascular, pulmonary, GI, , musculoskeletal, neuro, skin, endocrine and psych systems are negative, except as otherwise noted.      Objective    There were no vitals taken for this visit.  There is no height or weight on file to calculate BMI.  Physical Exam   GENERAL: healthy, alert and no distress  NECK: no adenopathy, no asymmetry, masses, or scars and thyroid normal to palpation  RESP: lungs clear to auscultation - no rales, rhonchi or wheezes  CV: regular rate and rhythm, normal S1 S2, no S3 or S4, no murmur, click or rub, no peripheral edema and peripheral pulses  strong  ABDOMEN: soft, nontender, no hepatosplenomegaly, no masses and bowel sounds normal  MS: no gross musculoskeletal defects noted, no edema    Diagnostic Test Results:  Labs reviewed in Epic  Results for orders placed or performed in visit on 06/19/20   UA reflex to Microscopic and Culture     Status: None    Specimen: Midstream Urine   Result Value Ref Range    Color Urine Yellow     Appearance Urine Clear     Glucose Urine Negative NEG^Negative mg/dL    Bilirubin Urine Negative NEG^Negative    Ketones Urine Negative NEG^Negative mg/dL    Specific Gravity Urine 1.014 1.003 - 1.035    Blood Urine Negative NEG^Negative    pH Urine 5.0 5.0 - 7.0 pH    Protein Albumin Urine Negative NEG^Negative mg/dL    Urobilinogen mg/dL Normal 0.0 - 2.0 mg/dL    Nitrite Urine Negative NEG^Negative    Leukocyte Esterase Urine Negative NEG^Negative    Source Midstream Urine    Basic metabolic panel     Status: Abnormal   Result Value Ref Range    Sodium 142 133 - 144 mmol/L    Potassium 4.2 3.4 - 5.3 mmol/L    Chloride 111 (H) 94 - 109 mmol/L    Carbon Dioxide 26 20 - 32 mmol/L    Anion Gap 5 3 - 14 mmol/L    Glucose 102 (H) 70 - 99 mg/dL    Urea Nitrogen 20 7 - 30 mg/dL    Creatinine 1.15 0.66 - 1.25 mg/dL    GFR Estimate 64 >60 mL/min/[1.73_m2]    GFR Estimate If Black 74 >60 mL/min/[1.73_m2]    Calcium 8.9 8.5 - 10.1 mg/dL   Lipid Profile     Status: Abnormal   Result Value Ref Range    Cholesterol 167 <200 mg/dL    Triglycerides 169 (H) <150 mg/dL    HDL Cholesterol 50 >39 mg/dL    LDL Cholesterol Calculated 83 <100 mg/dL    Non HDL Cholesterol 117 <130 mg/dL           Assessment & Plan     1.  Essential hypertension with blood pressure under control.  Continue with losartan 100 mg a day.  2.  Chronic kidney disease stage III.  Renal function actually is good today with creatinine of 1.15 and GFR of 64.  We will continue to monitor.  He seems to now have stage II disease.  3.  Mixed dyslipidemia been treated with atorvastatin  "20 mg a day with lipid profile as stated above.  Continue same dose.  Seems to be working well.  4.  Impaired fasting glucose with blood sugar 102.  We will continue to monitor.  Weight reduction discussed.  5.  Class II obesity.  Discussed diet and exercise.    He will return in 6 months with lipids, A1c, CBC and a CMP.     BMI:   Estimated body mass index is 38.02 kg/m  as calculated from the following:    Height as of this encounter: 1.717 m (5' 7.6\").    Weight as of this encounter: 112.1 kg (247 lb 1.6 oz).   Weight management plan: Discussed healthy diet and exercise guidelines            No follow-ups on file.    Adam Soliz MD  [facility]      "

## 2020-12-14 ENCOUNTER — HEALTH MAINTENANCE LETTER (OUTPATIENT)
Age: 70
End: 2020-12-14

## 2021-02-10 PROBLEM — M25.512 SHOULDER PAIN, LEFT: Status: RESOLVED | Noted: 2020-03-04 | Resolved: 2021-02-10

## 2021-02-10 PROBLEM — Z47.89 AFTERCARE FOLLOWING SURGERY OF THE MUSCULOSKELETAL SYSTEM: Status: RESOLVED | Noted: 2020-03-04 | Resolved: 2021-02-10

## 2021-04-05 ENCOUNTER — E-VISIT (OUTPATIENT)
Dept: URGENT CARE | Facility: URGENT CARE | Age: 71
End: 2021-04-05
Payer: MEDICARE

## 2021-04-05 DIAGNOSIS — Z20.822 SUSPECTED COVID-19 VIRUS INFECTION: Primary | ICD-10-CM

## 2021-04-05 PROCEDURE — 99207 PR NON-BILLABLE SERV PER CHARTING: CPT | Performed by: NURSE PRACTITIONER

## 2021-04-05 NOTE — PATIENT INSTRUCTIONS
Dear Driss Pope,    Your symptoms show that you may have coronavirus (COVID-19). This illness can cause fever, cough and trouble breathing. Many people get a mild case and get better on their own. Some people can get very sick.    Will I be tested for COVID-19?  We would like to test you for Covid-19 virus. I have placed orders for this test.     To schedule: go to your [employer] home page and scroll down to the section that says  You have an appointment that needs to be scheduled  and click the large green button that says  Schedule Now  and follow the steps to find the next available openings.    If you are unable to complete these [employer] scheduling steps, please call [phone] to schedule your testing.     Return to work/school/ guidance:  Please let your workplace manager and staffing office know when your quarantine ends     We can t give you an exact date as it depends on the above. You can calculate this on your own or work with your manager/staffing office to calculate this. (For example if you were exposed on 10/4, you would have to quarantine for 14 full days. That would be through 10/18. You could return on 10/19.)      If you receive a positive COVID-19 test result, follow the guidance of the those who are giving you the results. Usually the return to work is 10 (or in some cases 20 days from symptom onset.) If you work at [facility], you must also be cleared by Employee Occupational Health and Safety to return to work.        If you receive a negative COVID-19 test result and did not have a high risk exposure to someone with a known positive COVID-19 test, you can return to work once you're free of fever for 24 hours without fever-reducing medication and your symptoms are improving or resolved.      If you receive a negative COVID-19 test and If you had a high risk exposure to someone who has tested positive for COVID-19 then you can return to work 14 days after your last contact  with the positive individual    Note: If you have ongoing exposure to the covid positive person, this quarantine period may be more than 14 days. (For example, if you are continued to be exposed to your child who tested positive and cannot isolate from them, then the quarantine of 7-14 days can't start until your child is no longer contagious. This is typically 10 days from onset of the child's symptoms. So the total duration may be 17-24 days in this case.)    Sign up for [employer].   We know it's scary to hear that you might have COVID-19. We want to track your symptoms to make sure you're okay over the next 2 weeks. Please look for an email from [employer]--this is a free, online program that we'll use to keep in touch. To sign up, follow the link in the email you will receive. Learn more at [email or web address]    How can I take care of myself?    Get lots of rest. Drink extra fluids (unless a doctor has told you not to)    Take Tylenol (acetaminophen) or ibuprofen for fever or pain. If you have liver or kidney problems, ask your family doctor if it's okay to take Tylenol o ibuprofen    If you have other health problems (like cancer, heart failure, an organ transplant or severe kidney disease): Call your specialty clinic if you don't feel better in the next 2 days.    Know when to call 911. Emergency warning signs include:  o Trouble breathing or shortness of breath  o Pain or pressure in the chest that doesn't go away  o Feeling confused like you haven't felt before, or not being able to wake up  o Bluish-colored lips or face    Where can I get more information?  [facility] - About COVID-19:   [email or web address]    CDC - What to Do If You're Sick:   www.cdc.gov/coronavirus/2019-ncov/about/steps-when-sick.html

## 2021-04-06 DIAGNOSIS — Z20.822 SUSPECTED COVID-19 VIRUS INFECTION: ICD-10-CM

## 2021-04-06 PROCEDURE — U0003 INFECTIOUS AGENT DETECTION BY NUCLEIC ACID (DNA OR RNA); SEVERE ACUTE RESPIRATORY SYNDROME CORONAVIRUS 2 (SARS-COV-2) (CORONAVIRUS DISEASE [COVID-19]), AMPLIFIED PROBE TECHNIQUE, MAKING USE OF HIGH THROUGHPUT TECHNOLOGIES AS DESCRIBED BY CMS-2020-01-R: HCPCS | Performed by: NURSE PRACTITIONER

## 2021-04-06 PROCEDURE — U0005 INFEC AGEN DETEC AMPLI PROBE: HCPCS | Performed by: NURSE PRACTITIONER

## 2021-04-07 ENCOUNTER — TELEPHONE (OUTPATIENT)
Dept: URGENT CARE | Facility: URGENT CARE | Age: 71
End: 2021-04-07

## 2021-04-07 LAB
SARS-COV-2 RNA RESP QL NAA+PROBE: ABNORMAL
SPECIMEN SOURCE: ABNORMAL

## 2021-04-07 NOTE — TELEPHONE ENCOUNTER
"-Coronavirus (COVID-19) Notification    Caller Name (Patient, parent, daughter/son, grandparent, etc)  Driss patient    Reason for call  Notify of Positive Coronavirus (COVID-19) lab results, assess symptoms,  review  [facility] recommendations    Lab Result    Lab test:  2019-nCoV rRt-PCR or SARS-CoV-2 PCR    Oropharyngeal AND/OR nasopharyngeal swabs is POSITIVE for 2019-nCoV RNA/SARS-COV-2 PCR (COVID-19 virus)    RN Recommendations/Instructions per [facility] Coronavirus COVID-19 recommendations    Brief introduction script  Introduce self then review script:  \"I am calling on behalf of [employer].  We were notified that your Coronavirus test (COVID-19) for was POSITIVE for the virus.  I have some information to relay to you but first I wanted to mention that the [place] Dept of Health will be contacting you shortly [it's possible MD already called Patient] to talk to you more about how you are feeling and other people you have had contact with who might now also have the virus.  Also, [facility] is Partnering with the [facility] for Covid-19 research, you may be contacted directly by research staff.\"    Assessment (Inquire about Patient's current symptoms)   Assessment   Current Symptoms at time of phone call: (if no symptoms, document No symptoms] Fatigue, cough, slight nausea, sore throat, fever, chills  Body aches, pain in chest with deep cough or deep breath   Symptoms onset (if applicable) 4/2/2021     If at time of call, Patients symptoms hare worsened, the Patient should contact 911 or have someone drive them to Emergency Dept promptly:      If Patient calling 911, inform 911 personal that you have tested positive for the Coronavirus (COVID-19).  Place mask on and await 911 to arrive.    If Emergency Dept, If possible, please have another adult drive you to the Emergency Dept but you need to wear mask when in contact with other people.      Monoclonal Antibody " "Administration    You may be eligible to receive a new treatment with a monoclonal antibody for preventing hospitalization in patients at high risk for complications from COVID-19.   This medication is still experimental and available on a limited basis; it is given through an IV and must be given at an infusion center. Please note that not all people who are eligible will receive the medication since it is in limited supply.     Are you interested in being considered for this medication?  No.   Does the patient fit the criteria: Patient declined    If patient qualifies based on above criteria:  \"You will be contacted if you are selected to receive this treatment in the next 1-2 business days.   This is time sensitive and if you are not selected in the next 1-2 business days, you will not receive the medication.  If you do not receive a call to schedule, you have not been selected.\"      Review information with Patient    Your result was positive. This means you have COVID-19 (coronavirus).  We have sent you a letter that reviews the information that I'll be reviewing with you now.    How can I protect others?    If you have symptoms: stay home and away from others (self-isolate) until:    You've had no fever--and no medicine that reduces fever--for 1 full day (24 hours). And       Your other symptoms have gotten better. For example, your cough or breathing has improved. And     At least 10 days have passed since your symptoms started. (If you've been told by a doctor that you have a weak immune system, wait 20 days.)     If you don't have symptoms: Stay home and away from others (self-isolate) until at least 10 days have passed since your first positive COVID-19 test. (Date test collected)    During this time:    Stay in your own room, including for meals. Use your own bathroom if you can.    Stay away from others in your home. No hugging, kissing or shaking hands. No visitors.     Don't go to work, school or " anywhere else.     Clean  high touch  surfaces often (doorknobs, counters, handles, etc.). Use a household cleaning spray or wipes. You'll find a full list on the EPA website at www.epa.gov/pesticide-registration/list-n-disinfectants-use-against-sars-cov-2.     Cover your mouth and nose with a mask, tissue or other face covering to avoid spreading germs.    Wash your hands and face often with soap and water.    Make a list of people you have been in close contact with recently, even if either of you wore a face covering.   ; Start your list from 2 days before you became ill or had a positive test.  ; Include anyone that was within 6 feet of you for a cumulative total of 15 minutes or more in 24 hours. (Example: if you sat next to Mathew for 5 minutes in the morning and 10 minutes in the afternoon, then you were in close contact for 15 minutes total that day. Mathew would be added to your list.)    A public health worker will call or text you. It is important that you answer. They will ask you questions about possible exposures to COVID-19, such as people you have been in direct contact with and places you have visited.    Tell the people on your list that you have COVID-19; they should stay away from others for 14 days starting from the last time they were in contact with you (unless you are told something different from a public health worker).     Caregivers in these groups are at risk for severe illness due to COVID-19:  o People 65 years and older  o People who live in a nursing home or long-term care facility  o People with chronic disease (lung, heart, cancer, diabetes, kidney, liver, immunologic)  o People who have a weakened immune system, including those who:  - Are in cancer treatment  - Take medicine that weakens the immune system, such as corticosteroids  - Had a bone marrow or organ transplant  - Have an immune deficiency  - Have poorly controlled HIV or AIDS  - Are obese (body mass index of 40 or  higher)  - Smoke regularly    Caregivers should wear gloves while washing dishes, handling laundry and cleaning bedrooms and bathrooms.    Wash and dry laundry with special caution. Don't shake dirty laundry, and use the warmest water setting you can.    If you have a weakened immune system, ask your doctor about other actions you should take.    For more tips, go to www.cdc.gov/coronavirus/2019-ncov/downloads/10Things.pdf.    You should not go back to work until you meet the guidelines above for ending your home isolation. You don't need to be retested for COVID-19 before going back to work--studies show that you won't spread the virus if it's been at least 10 days since your symptoms started (or 20 days, if you have a weak immune system).    Employers: This document serves as formal notice of your employee's medical guidelines for going back to work. They must meet the above guidelines before going back to work in person.    How can I take care of myself?    1. Get lots of rest. Drink extra fluids (unless a doctor has told you not to).    2. Take Tylenol (acetaminophen) for fever or pain. If you have liver or kidney problems, ask your family doctor if it's okay to take Tylenol.     Take either:     650 mg (two 325 mg pills) every 4 to 6 hours, or     1,000 mg (two 500 mg pills) every 8 hours as needed.     Note: Don't take more than 3,000 mg in one day. Acetaminophen is found in many medicines (both prescribed and over-the-counter medicines). Read all labels to be sure you don't take too much.    For children, check the Tylenol bottle for the right dose (based on their age or weight).    3. If you have other health problems (like cancer, heart failure, an organ transplant or severe kidney disease): Call your specialty clinic if you don't feel better in the next 2 days.    4. Know when to call 911: Emergency warning signs include:    Trouble breathing or shortness of breath    Pain or pressure in the chest that  doesn't go away    Feeling confused like you haven't felt before, or not being able to wake up    Bluish-colored lips or face    5. Sign up for [employer]. We know it's scary to hear that you have COVID-19. We want to track your symptoms to make sure you're okay over the next 2 weeks. Please look for an email from [employer]--this is a free, online program that we'll use to keep in touch. To sign up, follow the link in the email. Learn more at [email or web address].    Where can I get more information?    [facility]: [email or web address]    Coronavirus Basics: www.health.[facility].[place]/diseases/coronavirus/basics.html    What to Do If You're Sick: www.cdc.gov/coronavirus/2019-ncov/about/steps-when-sick.html    Ending Home Isolation: www.cdc.gov/coronavirus/2019-ncov/hcp/disposition-in-home-patients.html     Caring for Someone with COVID-19: www.cdc.gov/coronavirus/2019-ncov/if-you-are-sick/care-for-someone.html     [facility] clinical trials (COVID-19 research studies): [email or web address].[facility]/n-clinical-trials     A Positive COVID-19 letter will be sent via [employer] or the mail. (Exception, no letters sent to Presurgerical/Preprocedure Patients)    Kaal Parsons LPN

## 2021-04-13 ENCOUNTER — VIRTUAL VISIT (OUTPATIENT)
Dept: FAMILY MEDICINE | Facility: CLINIC | Age: 71
End: 2021-04-13
Payer: MEDICARE

## 2021-04-13 DIAGNOSIS — U07.1 INFECTION DUE TO 2019 NOVEL CORONAVIRUS: Primary | ICD-10-CM

## 2021-04-13 DIAGNOSIS — R05.9 COUGH: ICD-10-CM

## 2021-04-13 DIAGNOSIS — R53.83 OTHER FATIGUE: ICD-10-CM

## 2021-04-13 DIAGNOSIS — R06.02 SOB (SHORTNESS OF BREATH): ICD-10-CM

## 2021-04-13 PROCEDURE — 99442 PR PHYSICIAN TELEPHONE EVALUATION 11-20 MIN: CPT | Mod: 95 | Performed by: NURSE PRACTITIONER

## 2021-04-13 RX ORDER — BENZONATATE 200 MG/1
200 CAPSULE ORAL 3 TIMES DAILY PRN
Qty: 90 CAPSULE | Refills: 1 | Status: SHIPPED | OUTPATIENT
Start: 2021-04-13 | End: 2021-07-30 | Stop reason: ALTCHOICE

## 2021-04-13 RX ORDER — ALBUTEROL SULFATE 90 UG/1
2 AEROSOL, METERED RESPIRATORY (INHALATION) EVERY 6 HOURS PRN
Qty: 6.7 G | Refills: 1 | Status: SHIPPED | OUTPATIENT
Start: 2021-04-13 | End: 2021-07-30 | Stop reason: ALTCHOICE

## 2021-04-13 NOTE — PROGRESS NOTES
"Gary is a 70 year old who is being evaluated via a billable telephone visit.      What phone number would you like to be contacted at? [phone]  How would you like to obtain your AVS? MyChart    Assessment & Plan     Infection due to 2019 novel coronavirus  Dx with COVID-19 on 4/6/2021. Advised his symptoms are typical, hopefully will resolve over time  - ADULT POST COVID CLINIC REFERRAL; Future    Cough  On-going cough that comes and goes. Will trial tessalon pearls, if not improving in 5 days will trial steroid burst  - ADULT POST COVID CLINIC REFERRAL; Future  - benzonatate (TESSALON) 200 MG capsule; Take 1 capsule (200 mg) by mouth 3 times daily as needed for cough    SOB (shortness of breath)  SOB, mild since having COVID-19. Trial inhaler. If not improving in 5 days will trial oral steroid burst  - ADULT POST COVID CLINIC REFERRAL; Future  - albuterol (PROAIR HFA/PROVENTIL HFA/VENTOLIN HFA) 108 (90 Base) MCG/ACT inhaler; Inhale 2 puffs into the lungs every 6 hours as needed for shortness of breath / dyspnea or wheezing    Other fatigue  Follow up with clinic if symptoms not improving  - ADULT POST COVID CLINIC REFERRAL; Future    BMI:   Estimated body mass index is 38.02 kg/m  as calculated from the following:    Height as of 6/19/20: 1.717 m (5' 7.6\").    Weight as of 6/19/20: 112.1 kg (247 lb 1.6 oz).     Return in about 1 week (around 4/20/2021), or if symptoms worsen or fail to improve.    JOSUE Anne, NP-C  [facility]   Gary is a 70 year old who presents for the following health issues  accompanied by his spouse:    HPI       Concern for COVID-19  About how many days ago did these symptoms start? 4/2/2021  Is this your first visit for this illness? No   How would you describe your symptoms since your last visit? My symptoms have improved, some days gets worse   In the 14 days before your symptoms started, have you had close contact with someone with COVID-19 " (Coronavirus)? Yes, I have been in contact with someone who has COVID-19/Coronavirus (confirmed by lab test).  Do you have a fever or chills? Yes, I felt feverish or had chills  Are you having new or worsening difficulty breathing? Yes   Please describe what kind of difficulty you are having breathing:Mild dyspnea (able to do ADLs without difficulty, mild shortness of breath with activities such as climbing one or two flights of stairs or walking briskly)  Do you have new or worsening cough? Yes, it's a dry cough.   Have you had any new or unexplained body aches? YES    Have you experienced any of the following NEW symptoms?    Headache: YES    Sore throat: No    Loss of taste or smell: No    Chest pain: YES    Diarrhea: No    Rash: No  What treatments have you tried? Day quil, nyquil, zycam, mucinex, tylenol   Who do you live with? Wife   Are you, or a household member, a healthcare worker or a ? No  Do you live in a nursing home, group home, or shelter? No  Do you have a way to get food/medications if quarantined? Yes, I have a friend or family member who can help me.            Symptoms started easter Friday 4/2/2021.  Dx on 4/6/2021. If he takes a deep breath has some chest discomfort. The coughing some days are good, other days worse. Dayquil/nightquil help. Has some productive cough. The fever/chills come and go. Sometimes body aches.  Last night it was up to 101.3. tylenol is helpful. No diarrhea but has upset stomach. Lots of gas. Urinating okay. Feels he is drinking enough water. Has appetite but nothing seems to be appealing, can taste and smell. Headaches are once a day. From Friday to Tuesday when he got tested a week ago it was bad, then since then mild improvement but then has relapse where he thinks he isn't getting better. Having on-going fatigue despite good sleep. Will get up, shower and then feel exhausted after that.       He was asking about his wife and her fatigue, they have  been self quarantining from each other. Advised if her fatigue worsens or she has concerns she should see her PCP        Review of Systems   Constitutional-as above, HEENT, cardiovascular, pulmonary-as above, gi and gu systems are negative, except as otherwise noted.      Objective           Vitals:  No vitals were obtained today due to virtual visit.    Physical Exam   healthy, alert and no distress  PSYCH: Alert and oriented times 3; coherent speech, normal   rate and volume, able to articulate logical thoughts, able   to abstract reason, no tangential thoughts, no hallucinations   or delusions  His affect is normal  RESP: No cough, no audible wheezing, able to talk in full sentences  Remainder of exam unable to be completed due to telephone visits    Results from last week            Phone call duration: 14 minutes

## 2021-04-13 NOTE — PATIENT INSTRUCTIONS
Call provider next Monday if cough and shortness of breath is not improving with the inhaler or cough medication

## 2021-04-14 ENCOUNTER — TELEPHONE (OUTPATIENT)
Dept: FAMILY MEDICINE | Facility: CLINIC | Age: 71
End: 2021-04-14

## 2021-04-14 NOTE — TELEPHONE ENCOUNTER
Spoke with patient and relayed provider's message below  Patient stated that he ended up just paying out of pocket for the Tessalon and had it filled    Ronnie Vann RN  [facility]

## 2021-04-14 NOTE — TELEPHONE ENCOUNTER
Message from [facility] [phone]    benzonatate (TESSALON) 200 MG capsule    Drug not covered by patient plan.  The preferred alternative is ALTDRUGTHERAPYPREFERREDPRODUCTREQUIRED.    Carol Hyde

## 2021-04-14 NOTE — TELEPHONE ENCOUNTER
Patient is recovering from recent COIVD infection. Since Tessalon is not covered by his insurance he can use over the count cough suppressant such as Robitussin DM.

## 2021-04-17 ENCOUNTER — HEALTH MAINTENANCE LETTER (OUTPATIENT)
Age: 71
End: 2021-04-17

## 2021-04-30 ENCOUNTER — VIRTUAL VISIT (OUTPATIENT)
Dept: NEUROLOGY | Facility: CLINIC | Age: 71
End: 2021-04-30
Attending: NURSE PRACTITIONER
Payer: MEDICARE

## 2021-04-30 DIAGNOSIS — U07.1 INFECTION DUE TO 2019 NOVEL CORONAVIRUS: ICD-10-CM

## 2021-04-30 DIAGNOSIS — R05.9 COUGH: ICD-10-CM

## 2021-04-30 DIAGNOSIS — R53.83 OTHER FATIGUE: ICD-10-CM

## 2021-04-30 DIAGNOSIS — R06.02 SOB (SHORTNESS OF BREATH): ICD-10-CM

## 2021-04-30 PROCEDURE — 99204 OFFICE O/P NEW MOD 45 MIN: CPT | Mod: 95 | Performed by: PSYCHIATRY & NEUROLOGY

## 2021-04-30 NOTE — LETTER
2021       RE: Driss Pope  [address]  [place] [address]     Dear Colleague,    Thank you for referring your patient, Driss Pope, to the [facility] [place] at [facility]. Please see a copy of my visit note below.    Gary is a 70 year old who is being evaluated via a billable video visit.          Platform used for Video Visit: [employer] x 30 min    Service Date: 2021    Adam Soliz MD  [facility]  [address] [place]  [address]    RE:  Driss Pope  MRN:  [id number]  :  1950    Dear Dr. Soliz:    We had the privilege of evaluating Mr. Pope in the Post-COVID Syndrome Clinic in Neurology regarding a question whether he has a post-COVID syndrome.  He says that it has been about 4 weeks since he tested positive for COVID.  It started with a sore throat and what he thought was a usual spring cold with fever and chills.  There was minimal to no loss of taste.  He had some loss of appetite.  He did not have it very severe and did not have respiratory problem and was not hospitalized.  It has mostly cleared.  He was able to play golf today, although with some shortness of breath.  He still has some cough, but his energy is getting better.  His wife just tested positive 10 days ago and she has very few symptoms.  He is on multiple medications and his past medical history shows that he got a diagnosis of dyslipidemia, acute gout, impingement syndrome of both shoulders, bursitis of the left elbow, obesity, hypertension, prostatic hyperplasia with TURP, erectile dysfunction and a PCA abnormality which has been .  He has had colonoscopy and arthroscopy.  Family history is noncontributory.  He may have had a slight kidney elevation of the creatinine, but it was deemed not to be significant.     On exam today, he is a very pleasant man.  Blood pressure 138/82.  His BMI is  112.1.  He has normal cranial nerve examination.  His coordination is very good.  He is right handed.  He walks very well with no loss of associated movement.  Romberg is negative.  His coordination is very good in the lower extremity and he can do tandem.  Does not appear particularly winded.  He has some general questions about COVID and we recommended that he can get the vaccine after the middle of May.  His wife is still contaminating and it is recommended that she follows the usual quarantine protocol.  He has not had any significant medical problems that put him at risk.  He is on albuterol for the respiratory problem at 2 puffs every 6 hours as needed.  He has been using less of that.  He has had some coughing and he is on Tessalon 200 mg 1 capsule 3 times a day as needed for cough.  He is on Cozaar for hypertension and on Lipitor.    He was told that he could gradually wean himself off the albuterol as he is feeling better, can still use some Mucinex he is on for reduction of secretions in his lungs.  Overall, his prognosis is excellent.  There is no evidence of post-COVID syndrome.  He was happy to hear about that.    Thank you for referring him.    Sincerely,    Stan Ray MD    D: 2021   T: 2021   MT: jeremy    Name:     FIDE HAN  MRN:      [id number]        Account:      [id number]   :      1950           Service Date: 2021       Document: [id number]

## 2021-04-30 NOTE — PROGRESS NOTES
Gary is a 70 year old who is being evaluated via a billable video visit.          Platform used for Video Visit: [employer] x 30 min

## 2021-04-30 NOTE — PROGRESS NOTES
Service Date: 2021    Adam Soliz MD  [facility]  [address] [place]  [address]    RE:  Driss Pope  MRN:  [id number]  :  1950    Dear Dr. Soliz:    We had the privilege of evaluating Mr. Pope in the Post-COVID Syndrome Clinic in Neurology regarding a question whether he has a post-COVID syndrome.  He says that it has been about 4 weeks since he tested positive for COVID.  It started with a sore throat and what he thought was a usual spring cold with fever and chills.  There was minimal to no loss of taste.  He had some loss of appetite.  He did not have it very severe and did not have respiratory problem and was not hospitalized.  It has mostly cleared.  He was able to play golf today, although with some shortness of breath.  He still has some cough, but his energy is getting better.  His wife just tested positive 10 days ago and she has very few symptoms.  He is on multiple medications and his past medical history shows that he got a diagnosis of dyslipidemia, acute gout, impingement syndrome of both shoulders, bursitis of the left elbow, obesity, hypertension, prostatic hyperplasia with TURP, erectile dysfunction and a PCA abnormality which has been .  He has had colonoscopy and arthroscopy.  Family history is noncontributory.  He may have had a slight kidney elevation of the creatinine, but it was deemed not to be significant.     On exam today, he is a very pleasant man.  Blood pressure 138/82.  His BMI is 112.1.  He has normal cranial nerve examination.  His coordination is very good.  He is right handed.  He walks very well with no loss of associated movement.  Romberg is negative.  His coordination is very good in the lower extremity and he can do tandem.  Does not appear particularly winded.  He has some general questions about COVID and we recommended that he can get the vaccine after the middle of May.  His wife is still contaminating and it is  recommended that she follows the usual quarantine protocol.  He has not had any significant medical problems that put him at risk.  He is on albuterol for the respiratory problem at 2 puffs every 6 hours as needed.  He has been using less of that.  He has had some coughing and he is on Tessalon 200 mg 1 capsule 3 times a day as needed for cough.  He is on Cozaar for hypertension and on Lipitor.    He was told that he could gradually wean himself off the albuterol as he is feeling better, can still use some Mucinex he is on for reduction of secretions in his lungs.  Overall, his prognosis is excellent.  There is no evidence of post-COVID syndrome.  He was happy to hear about that.    Thank you for referring him.    Sincerely,    MD Stan Kapoor MD        D: 2021   T: 2021   MT: jeremy    Name:     FIDE HAN  MRN:      [id number]        Account:      [id number]   :      1950           Service Date: 2021       Document: [id number]

## 2021-05-13 ENCOUNTER — OFFICE VISIT (OUTPATIENT)
Dept: UROLOGY | Facility: CLINIC | Age: 71
End: 2021-05-13
Payer: MEDICARE

## 2021-05-13 DIAGNOSIS — Z90.79 S/P TURP: Primary | ICD-10-CM

## 2021-05-13 PROCEDURE — 51798 US URINE CAPACITY MEASURE: CPT | Performed by: UROLOGY

## 2021-05-13 PROCEDURE — 99212 OFFICE O/P EST SF 10 MIN: CPT | Mod: 25 | Performed by: UROLOGY

## 2021-05-13 NOTE — NURSING NOTE
Driss Pope's goals for this visit include:   Chief Complaint   Patient presents with     Follow Up     1 year follow up       He requests these members of his care team be copied on today's visit information:     PCP: Adam Soliz    Referring Provider:  No referring provider defined for this encounter.    There were no vitals taken for this visit.    Do you need any medication refills at today's visit?     post void residual = 39 ml    Nunu Fink LPN on 5/13/2021 at 11:33 AM

## 2021-05-13 NOTE — PROGRESS NOTES
JEWEL Everett   CHIEF COMPLAINT   It was my pleasure to see Driss Pope who is a 71 year old male for follow-up of LUTS.      HPI   Driss Pope is a very pleasant 71 year old male who presents with a history of LUTS.      9/3/19  Has had frequency for 5-6 years.   LUTS: slow stream, frequency, urgency, urge incontinence - uses diapers when going for a longer drive  Has been on tamsulosin 0.4mg (Flomax) for 5-6 years at [facility]  Started dutaseride 3-4 months ago.   No real changes with dutasteride.  No prior acute urinary retention.      AUASS: 9-1-3-2-5-1-3 = 17  QOL = 4     2 of 5 brothers with prostate cancer. One brother diagnosed in 50-55 and treated with surgery and another in early 60s treated with radiation.    He is now s/p Greenlight photovaporization of the prostate (PVP) on 10/2/19    1/7/20:  He notes his stream is much better than before surgery, however he continues to have issues with urgency    AUASS: 3-0-3-5-1-0-2 = 14  QOL = 4  PVR = 35cc    4/9/20:  He is doing very well today  Reports only very occasional episodes of increased frequency. These do not happen every day and no clear related events. Notes that when he would go to the movies he would need to go 3-4 times - he drinks water and has popcorn with this.     AUASS: 4-5-0-1-1-0-1 = 5  QOL = 2  PVR = 14cc     TODAY 5/13/21:  Got COVID-19 around [facility]  Doing well now  He has been off the pills for several months  Very happy with his voiding and no other concerns    AUASS: 1-6-4-0-0-0-1 = 2  QOL = 0  PVR = 39cc      PHYSICAL EXAM  Patient is a 71 year old  male   Vitals: There were no vitals taken for this visit.  General Appearance Adult: There is no height or weight on file to calculate BMI.  Alert, no acute distress, oriented  HENT: throat/mouth:normal, good dentition  Lungs: no respiratory distress, or pursed lip breathing  Heart: No obvious jugular venous distension present  Abdomen: soft, nontender, no organomegaly or  masses  Musculoskeltal: extremities normal, no peripheral edema  Skin: no suspicious lesions or rashes  Neuro: Alert, oriented, speech and mentation normal  Psych: affect and mood normal  Gait: Normal    Component PSA   Latest Ref Rng & Units 0 - 4 ug/L   7/9/2009 2.15   5/26/2010 2.25   4/29/2011 2.55   9/19/2012 5.41 (H)   3/18/2013 2.72   10/29/2014 2.63   12/14/2015 3.73   1/2/2017 3.37   9/3/2019 1.72       ASSESSMENT and PLAN  71 year old man with LUTS now s/p Greenlight photovaporization of the prostate (PVP) on 10/2/19    LUTS  -He is doing very well with resolution of his urinary symptoms  -He is very happy with the outcome of the surgery and has no other complaints today  -I reviewed his PSA history and this was very reassuring at our last check  -Recommend continued PSA monitoring until age of 74 and he may have this done through his PCP  -We discussed that he may follow-up with me on a as needed basis    Time spent: 10 minutes spent on the date of the encounter doing chart review, history and exam, documentation and further activities as noted above.     Oscar Resendiz MD   Urology  [facility]  Clinic Phone [phone]

## 2021-05-31 ENCOUNTER — RECORDS - HEALTHEAST (OUTPATIENT)
Dept: ADMINISTRATIVE | Facility: CLINIC | Age: 71
End: 2021-05-31

## 2021-07-30 ENCOUNTER — OFFICE VISIT (OUTPATIENT)
Dept: FAMILY MEDICINE | Facility: CLINIC | Age: 71
End: 2021-07-30
Payer: MEDICARE

## 2021-07-30 VITALS
RESPIRATION RATE: 18 BRPM | HEART RATE: 103 BPM | SYSTOLIC BLOOD PRESSURE: 124 MMHG | BODY MASS INDEX: 38.91 KG/M2 | WEIGHT: 247.9 LBS | HEIGHT: 67 IN | TEMPERATURE: 98 F | OXYGEN SATURATION: 98 % | DIASTOLIC BLOOD PRESSURE: 86 MMHG

## 2021-07-30 DIAGNOSIS — E78.2 MIXED DYSLIPIDEMIA: ICD-10-CM

## 2021-07-30 DIAGNOSIS — I10 HYPERTENSION, GOAL BELOW 140/90: ICD-10-CM

## 2021-07-30 DIAGNOSIS — R55 PRE-SYNCOPE: Primary | ICD-10-CM

## 2021-07-30 LAB
ALBUMIN SERPL-MCNC: 4 G/DL (ref 3.4–5)
ALP SERPL-CCNC: 71 U/L (ref 40–150)
ALT SERPL W P-5'-P-CCNC: 36 U/L (ref 0–70)
ANION GAP SERPL CALCULATED.3IONS-SCNC: <1 MMOL/L (ref 3–14)
AST SERPL W P-5'-P-CCNC: 18 U/L (ref 0–45)
BILIRUB SERPL-MCNC: 0.5 MG/DL (ref 0.2–1.3)
BUN SERPL-MCNC: 27 MG/DL (ref 7–30)
CALCIUM SERPL-MCNC: 9.5 MG/DL (ref 8.5–10.1)
CHLORIDE BLD-SCNC: 108 MMOL/L (ref 94–109)
CO2 SERPL-SCNC: 32 MMOL/L (ref 20–32)
CREAT SERPL-MCNC: 1.26 MG/DL (ref 0.66–1.25)
ERYTHROCYTE [DISTWIDTH] IN BLOOD BY AUTOMATED COUNT: 13.6 % (ref 10–15)
GFR SERPL CREATININE-BSD FRML MDRD: 57 ML/MIN/1.73M2
GLUCOSE BLD-MCNC: 111 MG/DL (ref 70–99)
HCT VFR BLD AUTO: 43.2 % (ref 40–53)
HGB BLD-MCNC: 14.2 G/DL (ref 13.3–17.7)
MCH RBC QN AUTO: 29.2 PG (ref 26.5–33)
MCHC RBC AUTO-ENTMCNC: 32.9 G/DL (ref 31.5–36.5)
MCV RBC AUTO: 89 FL (ref 78–100)
PLATELET # BLD AUTO: 182 10E3/UL (ref 150–450)
POTASSIUM BLD-SCNC: 3.9 MMOL/L (ref 3.4–5.3)
PROT SERPL-MCNC: 7 G/DL (ref 6.8–8.8)
RBC # BLD AUTO: 4.86 10E6/UL (ref 4.4–5.9)
SODIUM SERPL-SCNC: 140 MMOL/L (ref 133–144)
WBC # BLD AUTO: 7.3 10E3/UL (ref 4–11)

## 2021-07-30 PROCEDURE — 36415 COLL VENOUS BLD VENIPUNCTURE: CPT | Performed by: INTERNAL MEDICINE

## 2021-07-30 PROCEDURE — 80053 COMPREHEN METABOLIC PANEL: CPT | Performed by: INTERNAL MEDICINE

## 2021-07-30 PROCEDURE — 99214 OFFICE O/P EST MOD 30 MIN: CPT | Performed by: INTERNAL MEDICINE

## 2021-07-30 PROCEDURE — 93000 ELECTROCARDIOGRAM COMPLETE: CPT | Performed by: INTERNAL MEDICINE

## 2021-07-30 PROCEDURE — 85027 COMPLETE CBC AUTOMATED: CPT | Performed by: INTERNAL MEDICINE

## 2021-07-30 ASSESSMENT — PAIN SCALES - GENERAL: PAINLEVEL: MILD PAIN (2)

## 2021-07-30 ASSESSMENT — MIFFLIN-ST. JEOR: SCORE: 1835.71

## 2021-07-30 NOTE — PROGRESS NOTES
"Assessment & Plan     1.  Presyncope versus benign positional vertigo.  I think benign positional vertigo is most likely the etiology.  However both we will check CBC CMP to rule out other causes such as anemia or electrolyte imbalance.  His EKG showed normal sinus rhythm.  I do not think this is dysrhythmia issue.  Per history had somewhat similar symptoms few years back.  2.  Hypertension with blood pressure under control  3.  Mixed dyslipidemia been treated with statin therapy.  Advised to have fasting lipids in near future.     BMI:   Estimated body mass index is 38.64 kg/m  as calculated from the following:    Height as of this encounter: 1.706 m (5' 7.17\").    Weight as of this encounter: 112.4 kg (247 lb 14.4 oz).   Weight management plan: Discussed healthy diet and exercise guidelines        No follow-ups on file.    Adam Soliz MD  [facility]     Driss Pope is a 71 year old male who presents to clinic today for the following health issues accompanied by his :    History of Present Illness       Migraines:   Since the patient's last clinic visit, headaches are: no change  The patient is getting headaches:  2-4 times per day  He is (sometimes has to stop and take a break ) able to do normal daily activities when he has a migraine.  The patient is taking the following rescue/relief medications:  Tylenol   Patient states \"I get some relief\" from the rescue/relief medications.   The patient is taking the following medications to prevent migraines:  No medications to prevent migraines  In the past 4 weeks, the patient has gone to an Urgent Care or Emergency Room 0 times times due to headaches.    He eats 4 or more servings of fruits and vegetables daily.He consumes 0 sweetened beverage(s) daily.He exercises with enough effort to increase his heart rate 60 or more minutes per day.  He exercises with enough effort to increase his heart rate 3 or less days per week. "   He is taking medications regularly.     71-year-old gentleman with history of hypertension and mixed dyslipidemia as well as impaired fasting glucose has not been feeling well for the past 10 to 14 days.  He had COVID-19 infection in April of this year.  On July 15 he took Covid vaccine the first dose.  2 days later July 17 he started having random episodes of lightheadedness and a little bit of off-balance feeling.  He did not pass out but felt like he might.  The episodes are more noticeable when he lays down or gets up from a laying down position.  He has no chest pain or shortness of breath.  No palpitation.  With this episode he also feels head pressure.  No lateralizing weakness or vision problems.    Review of Systems   Constitutional, HEENT, cardiovascular, pulmonary, GI, , musculoskeletal, neuro, skin, endocrine and psych systems are negative, except as otherwise noted.      Objective    There were no vitals taken for this visit.  There is no height or weight on file to calculate BMI.  Physical Exam   GENERAL: healthy, alert and no distress  EYES: Eyes grossly normal to inspection, PERRL and conjunctivae and sclerae normal  HENT: ear canals and TM's normal, nose and mouth without ulcers or lesions  NECK: no adenopathy, no asymmetry, masses, or scars and thyroid normal to palpation  RESP: lungs clear to auscultation - no rales, rhonchi or wheezes  CV: regular rate and rhythm, normal S1 S2, no S3 or S4, no murmur, click or rub, no peripheral edema and peripheral pulses strong  ABDOMEN: soft, nontender, no hepatosplenomegaly, no masses and bowel sounds normal  MS: no gross musculoskeletal defects noted, no edema

## 2021-08-10 ENCOUNTER — ANCILLARY PROCEDURE (OUTPATIENT)
Dept: GENERAL RADIOLOGY | Facility: CLINIC | Age: 71
End: 2021-08-10
Attending: PHYSICIAN ASSISTANT
Payer: MEDICARE

## 2021-08-10 ENCOUNTER — OFFICE VISIT (OUTPATIENT)
Dept: URGENT CARE | Facility: URGENT CARE | Age: 71
End: 2021-08-10
Payer: COMMERCIAL

## 2021-08-10 ENCOUNTER — NURSE TRIAGE (OUTPATIENT)
Dept: FAMILY MEDICINE | Facility: CLINIC | Age: 71
End: 2021-08-10

## 2021-08-10 VITALS
BODY MASS INDEX: 38.43 KG/M2 | SYSTOLIC BLOOD PRESSURE: 146 MMHG | HEART RATE: 109 BPM | RESPIRATION RATE: 18 BRPM | OXYGEN SATURATION: 93 % | DIASTOLIC BLOOD PRESSURE: 100 MMHG | TEMPERATURE: 98.3 F | WEIGHT: 246.6 LBS

## 2021-08-10 DIAGNOSIS — M54.2 NECK PAIN ON LEFT SIDE: ICD-10-CM

## 2021-08-10 DIAGNOSIS — M25.512 ACUTE PAIN OF LEFT SHOULDER: ICD-10-CM
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
## 2021-08-10 DIAGNOSIS — I10 ELEVATED BLOOD PRESSURE READING IN OFFICE WITH DIAGNOSIS OF HYPERTENSION: ICD-10-CM

## 2021-08-10 PROCEDURE — 73030 X-RAY EXAM OF SHOULDER: CPT | Mod: LT | Performed by: RADIOLOGY

## 2021-08-10 PROCEDURE — 99214 OFFICE O/P EST MOD 30 MIN: CPT | Performed by: PHYSICIAN ASSISTANT

## 2021-08-10 PROCEDURE — 72040 X-RAY EXAM NECK SPINE 2-3 VW: CPT | Performed by: RADIOLOGY

## 2021-08-10 ASSESSMENT — PAIN SCALES - GENERAL: PAINLEVEL: MILD PAIN (3)
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
## 2021-09-03 ENCOUNTER — OFFICE VISIT (OUTPATIENT)
Dept: NEUROSURGERY | Facility: CLINIC | Age: 71
End: 2021-09-03
Payer: COMMERCIAL

## 2021-09-03 VITALS
HEART RATE: 80 BPM | WEIGHT: 241 LBS | DIASTOLIC BLOOD PRESSURE: 91 MMHG | RESPIRATION RATE: 16 BRPM | SYSTOLIC BLOOD PRESSURE: 158 MMHG | BODY MASS INDEX: 37.56 KG/M2

## 2021-09-03 DIAGNOSIS — M25.512 ACUTE PAIN OF LEFT SHOULDER: ICD-10-CM

## 2021-09-03 DIAGNOSIS — M54.2 NECK PAIN ON LEFT SIDE: ICD-10-CM

## 2021-09-03 DIAGNOSIS — R42 VERTIGO: Primary | ICD-10-CM
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
## 2021-09-03 PROCEDURE — 99203 OFFICE O/P NEW LOW 30 MIN: CPT | Performed by: PHYSICIAN ASSISTANT
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
## 2021-10-02 ENCOUNTER — HEALTH MAINTENANCE LETTER (OUTPATIENT)
Age: 71
End: 2021-10-02

## 2022-01-05 ENCOUNTER — OFFICE VISIT (OUTPATIENT)
Dept: URGENT CARE | Facility: URGENT CARE | Age: 72
End: 2022-01-05
Payer: COMMERCIAL

## 2022-01-05 VITALS
WEIGHT: 250.4 LBS | HEART RATE: 90 BPM | TEMPERATURE: 97.9 F | BODY MASS INDEX: 39.03 KG/M2 | DIASTOLIC BLOOD PRESSURE: 80 MMHG | OXYGEN SATURATION: 98 % | SYSTOLIC BLOOD PRESSURE: 158 MMHG

## 2022-01-05 DIAGNOSIS — M25.511 ACUTE PAIN OF RIGHT SHOULDER: Primary | ICD-10-CM
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
## 2022-01-05 DIAGNOSIS — I10 HYPERTENSION, GOAL BELOW 140/90: ICD-10-CM

## 2022-01-05 DIAGNOSIS — M54.2 NECK PAIN ON RIGHT SIDE: ICD-10-CM

## 2022-01-05 PROCEDURE — 99213 OFFICE O/P EST LOW 20 MIN: CPT | Performed by: INTERNAL MEDICINE

## 2022-01-05 RX ORDER — CYCLOBENZAPRINE HCL 10 MG
10 TABLET ORAL 3 TIMES DAILY PRN
Qty: 20 TABLET | Refills: 0 | Status: SHIPPED | OUTPATIENT
Start: 2022-01-05 | End: 2022-04-07

## 2022-01-05 RX ORDER — CYCLOBENZAPRINE HCL 10 MG
10 TABLET ORAL 3 TIMES DAILY PRN
Qty: 20 TABLET | Refills: 0 | Status: SHIPPED | OUTPATIENT
Start: 2022-01-05 | End: 2022-01-05
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
## 2022-01-05 NOTE — PATIENT INSTRUCTIONS
See PT if not improving within 7 days.    Gary to follow up with Primary Care provider regarding elevated blood pressure. Recheck BP in 1-2 weeks with a nurse visit, [place] pharmacy visit, or a visit to primary care doctor.

## 2022-03-29 ENCOUNTER — RESEARCH ENCOUNTER (OUTPATIENT)
Dept: NEUROLOGY | Facility: CLINIC | Age: 72
End: 2022-03-29
Payer: MEDICARE

## 2022-04-07 ENCOUNTER — TELEPHONE (OUTPATIENT)
Dept: PHARMACY | Facility: CLINIC | Age: 72
End: 2022-04-07
Payer: MEDICARE

## 2022-04-07 DIAGNOSIS — I10 HYPERTENSION, GOAL BELOW 140/90: Primary | ICD-10-CM

## 2022-04-07 RX ORDER — VALSARTAN 160 MG/1
160 TABLET ORAL DAILY
Qty: 30 TABLET | Refills: 1 | Status: SHIPPED | OUTPATIENT
Start: 2022-04-07 | End: 2022-05-05 | Stop reason: DRUGHIGH

## 2022-04-07 RX ORDER — HYDROCHLOROTHIAZIDE 12.5 MG/1
12.5 TABLET ORAL DAILY
Qty: 30 TABLET | Refills: 1 | Status: SHIPPED | OUTPATIENT
Start: 2022-04-07 | End: 2022-04-07

## 2022-04-07 NOTE — CONFIDENTIAL NOTE
Called patient to f/u on BP as part of mGlide study.  This is our first phone call since he enrolled 3.28.22.     Home BP's are running 157/96 on average.  Patient is on losartan 100mg daily in the morning  and reports excellent adherence (uses pill box).  If he misses a dose, he will take it in the evening. Tolerating losartan well.      Was on lisinopril 20 years ago -- had a cough.  Was on metoprolol and it was stopped in 2017;  Chart notes that he had hypotension and bradycardia.  Patient indicates he had implemented lifestyle changes at that time and no longer needed it.  We initially discussed adding a diuretic, but patient does have a history of acute gout (a prescription for hydrochlorothiazide was initially sent but then canceled; pharmacy notified).       Patient is not meeting average BP goal of <140/90.    Recommend switching from losartan 100mg to valsartan 160mg for better efficacy.  Will need f/up labs in 2 weeks, and will may need titration to 320mg after that.  Has a metabolic panel from July 2021 with normal potassium and estimated GFR of 57 ml/min.     Medication changes made per CPA with Dr. Adam Soliz.     Will f/u on patient BP readings in 1 week.    Carol Malone, Pharm.D., [facility], BCPS

## 2022-04-15 ENCOUNTER — TELEPHONE (OUTPATIENT)
Dept: PHARMACY | Facility: CLINIC | Age: 72
End: 2022-04-15
Payer: MEDICARE

## 2022-04-15 NOTE — CONFIDENTIAL NOTE
Called patient to f/u on BP as part of mGlide study.       Home BP's are running 157/96 on average, pasted below.   Patient is on valsartan 160mg  and reports good adherence.  Was recently switched from losartan to valsartan.  States he started the valsartan 3 days ago, and is tolerating this change well.       Patient is not meeting average BP goal of <140/90, but we've seen lower readings since the medication change.  Will continue to follow.       Will f/u on patient BP readings in 1 week.    Carol Malone, Pharm.D., [facility], BCPS

## 2022-04-15 NOTE — TELEPHONE ENCOUNTER
Lvm for patient to schedule follow up hypertension check with provider. I provided [facility] phone number for patient to call back and schedule.

## 2022-04-22 ENCOUNTER — TELEPHONE (OUTPATIENT)
Dept: PHARMACY | Facility: CLINIC | Age: 72
End: 2022-04-22

## 2022-04-22 ENCOUNTER — VIRTUAL VISIT (OUTPATIENT)
Dept: FAMILY MEDICINE | Facility: CLINIC | Age: 72
End: 2022-04-22
Payer: MEDICARE

## 2022-04-22 DIAGNOSIS — I10 HYPERTENSION, GOAL BELOW 140/90: Primary | ICD-10-CM

## 2022-04-22 DIAGNOSIS — R73.01 IFG (IMPAIRED FASTING GLUCOSE): ICD-10-CM

## 2022-04-22 DIAGNOSIS — E78.2 MIXED DYSLIPIDEMIA: ICD-10-CM

## 2022-04-22 DIAGNOSIS — N18.31 CHRONIC KIDNEY DISEASE, STAGE 3A (H): ICD-10-CM

## 2022-04-22 PROCEDURE — 99214 OFFICE O/P EST MOD 30 MIN: CPT | Mod: 95 | Performed by: INTERNAL MEDICINE

## 2022-04-22 NOTE — CONFIDENTIAL NOTE
Called patient to f/u on BP as part of mGlide study.  Left VM on 4.22.     Home BP's are running 153/94 on average, pasted below.  Patient is on valsartan 160mg daily (switched from losartan on 4/7/22).    He is due for follow-up labs after the medication change, and will likely need an increase in dose after labs are completed.   Will send [employer] note to patient with request to complete labs.      Will f/u on patient BP readings in 1 week.      Carol Malone, Pharm.D., FCCP, BCPS

## 2022-04-22 NOTE — PROGRESS NOTES
"Gary is a 71 year old who is being evaluated via a billable video visit.      How would you like to obtain your AVS? MyChart  If the video visit is dropped, the invitation should be resent by: Send to e-mail at: [email or web address]  Will anyone else be joining your video visit? No    Video Start Time: 1:27 PM    Assessment & Plan     1.  Essential hypertension.  Currently enrolled in mglide study so his blood pressure is monitored continuously and adjustment in medication been made by Pharm.D.  Recently switched to losartan from losartan.    Advised them to keep me in the loop..  Meantime I will check BMP urine microalbumin lipids and A1c.  I will get back to the patient of the results.  Will also time when to return for follow-up.  If everything looks good in the lab ThinPrep 6 months.  2.  Chronic kidney disease stage IIIa suspected based on last creatinine last summer.  We will be rechecking BMP today and get back to him with recommendation.  3.  Mixed dyslipidemia been treated with atorvastatin 20 mg a day.  Will check lipids and get back to him with recommendation.  4.  Impaired fasting glucose.  I will be checking blood sugar as well as A1c.       BMI:   Estimated body mass index is 39.03 kg/m  as calculated from the following:    Height as of 7/30/21: 1.706 m (5' 7.17\").    Weight as of 1/5/22: 113.6 kg (250 lb 6.4 oz).             Adam Soliz MD  [facility]    Brock Vega is a 71 year old who presents for the following health issues     History of Present Illness       Hypertension: He presents for follow up of hypertension.  He does check blood pressure  regularly outside of the clinic. Outside blood pressures have been over 140/90. He does not follow a low salt diet.     He eats 4 or more servings of fruits and vegetables daily.He consumes 0 sweetened beverage(s) daily.He exercises with enough effort to increase his heart rate 30 to 60 minutes per day.  He exercises " with enough effort to increase his heart rate 3 or less days per week.   He is taking medications regularly.     Patient was asked to see me for follow-up.  He had a video visit with me today.  Indicates he is getting along fine.  He is currently enrolled in a Mglide study so the family has been monitoring blood pressure continuously and making adjustment in medication.  He was recently switched from losartan to valsartan 160 mg a day.    Patient denies chest pain, shortness of breath dizziness lightheadedness.  He has been taking atorvastatin as prescribed.  He does not regularly exercise except goes for dancing.  Indicates his weight has remained stable around 250 pounds.          Review of Systems   Constitutional, HEENT, cardiovascular, pulmonary, GI, , musculoskeletal, neuro, skin, endocrine and psych systems are negative, except as otherwise noted.      Objective           Vitals:  No vitals were obtained today due to virtual visit.    Physical Exam   GENERAL: Healthy, alert and no distress  EYES: Eyes grossly normal to inspection.  No discharge or erythema, or obvious scleral/conjunctival abnormalities.  RESP: No audible wheeze, cough, or visible cyanosis.  No visible retractions or increased work of breathing.    SKIN: Visible skin clear. No significant rash, abnormal pigmentation or lesions.  NEURO: Cranial nerves grossly intact.  Mentation and speech appropriate for age.  PSYCH: Mentation appears normal, affect normal/bright, judgement and insight intact, normal speech and appearance well-groomed.              Video-Visit Details    Type of service:  Video Visit    Video End Time:1:27 PM    Originating Location (pt. Location): Home    Distant Location (provider location):  [facility]     Platform used for Video Visit: [employer]

## 2022-04-25 ENCOUNTER — TELEPHONE (OUTPATIENT)
Dept: PHARMACY | Facility: CLINIC | Age: 72
End: 2022-04-25
Payer: MEDICARE

## 2022-04-25 NOTE — CONFIDENTIAL NOTE
Called patient as part of [facility] study, to follow-up on messages left last week.    Patient saw Dr. Soliz on 4.22, and has labwork (including BMP) scheduled for tomorrow.    He is currently taking valsartan 160mg daily, which was switched from losartan on 4.7.22.    Patient is not yet at BP goal of <140/90, but is trending down with the medication change.  After confirming normal labs, consider increasing valsartan to 320mg daily to improve BP control.    Patient educated that Dr. Saul Brooks will be taking over as [facility] pharmacist starting next week.    Carol Malone, Pharm.D., [facility], BCPS

## 2022-04-26 ENCOUNTER — LAB (OUTPATIENT)
Dept: LAB | Facility: CLINIC | Age: 72
End: 2022-04-26
Payer: MEDICARE

## 2022-04-26 DIAGNOSIS — I10 HYPERTENSION, GOAL BELOW 140/90: ICD-10-CM

## 2022-04-26 DIAGNOSIS — E78.2 MIXED DYSLIPIDEMIA: ICD-10-CM

## 2022-04-26 DIAGNOSIS — R73.01 IFG (IMPAIRED FASTING GLUCOSE): ICD-10-CM

## 2022-04-26 LAB
ANION GAP SERPL CALCULATED.3IONS-SCNC: 6 MMOL/L (ref 3–14)
BUN SERPL-MCNC: 21 MG/DL (ref 7–30)
CALCIUM SERPL-MCNC: 9.4 MG/DL (ref 8.5–10.1)
CHLORIDE BLD-SCNC: 108 MMOL/L (ref 94–109)
CHOLEST SERPL-MCNC: 158 MG/DL
CO2 SERPL-SCNC: 27 MMOL/L (ref 20–32)
CREAT SERPL-MCNC: 1.13 MG/DL (ref 0.66–1.25)
CREAT UR-MCNC: 136 MG/DL
FASTING STATUS PATIENT QL REPORTED: YES
GFR SERPL CREATININE-BSD FRML MDRD: 69 ML/MIN/1.73M2
GLUCOSE BLD-MCNC: 102 MG/DL (ref 70–99)
HBA1C MFR BLD: 5.6 % (ref 0–5.6)
HDLC SERPL-MCNC: 51 MG/DL
LDLC SERPL CALC-MCNC: 65 MG/DL
MICROALBUMIN UR-MCNC: 22 MG/L
MICROALBUMIN/CREAT UR: 16.18 MG/G CR (ref 0–17)
NONHDLC SERPL-MCNC: 107 MG/DL
POTASSIUM BLD-SCNC: 3.9 MMOL/L (ref 3.4–5.3)
SODIUM SERPL-SCNC: 141 MMOL/L (ref 133–144)
TRIGL SERPL-MCNC: 208 MG/DL

## 2022-04-26 PROCEDURE — 80061 LIPID PANEL: CPT

## 2022-04-26 PROCEDURE — 36415 COLL VENOUS BLD VENIPUNCTURE: CPT

## 2022-04-26 PROCEDURE — 80048 BASIC METABOLIC PNL TOTAL CA: CPT

## 2022-04-26 PROCEDURE — 83036 HEMOGLOBIN GLYCOSYLATED A1C: CPT

## 2022-04-26 PROCEDURE — 82043 UR ALBUMIN QUANTITATIVE: CPT

## 2022-05-05 ENCOUNTER — TELEPHONE (OUTPATIENT)
Dept: PHARMACY | Facility: CLINIC | Age: 72
End: 2022-05-05
Payer: MEDICARE

## 2022-05-05 DIAGNOSIS — I10 HYPERTENSION, GOAL BELOW 140/90: ICD-10-CM

## 2022-05-05 RX ORDER — VALSARTAN 320 MG/1
320 TABLET ORAL DAILY
Qty: 90 TABLET | Refills: 3 | Status: SHIPPED | OUTPATIENT
Start: 2022-05-05 | End: 2023-04-11

## 2022-05-05 NOTE — CONFIDENTIAL NOTE
Called patient to f/u on BP as part of mGlide study.       Home BP's are running 153/96 on average, pasted below  Patient is on valsartan 160mg daily  and reports good  adherence. Tolerating medications well;  Recent labs after the switch from losartan to valsartan were WNL.       Patient is not meeting average BP goal of <140/90.    Recommend increasing valsartan to 320 mg daily.   Prescription sent to patient's mail order pharmacy;  He will continue on the current dose until new prescription arrives.      Will f/u on patient BP readings in 1 week.    Carol Malone, Pharm.D., [facility], BCPS

## 2022-05-08 ENCOUNTER — HEALTH MAINTENANCE LETTER (OUTPATIENT)
Age: 72
End: 2022-05-08

## 2022-05-13 ENCOUNTER — TELEPHONE (OUTPATIENT)
Dept: PHARMACY | Facility: CLINIC | Age: 72
End: 2022-05-13
Payer: MEDICARE

## 2022-05-13 NOTE — CONFIDENTIAL NOTE
Called patient to f/u on BP as part of mGlide study.       Home BP's are running 146/90 on average, pasted below.  Patient is on valsartan 320 mg daily (increased from 160mg last week)  and reports good adherence. Tolerating medications well.       Will be traveling a lot in June, but will bring his cuff with him and continue regular checks;  Will continue to be available on his cell phone.      Patient is not meeting average BP goal of <140/90, but it's only been 1 week on the higher dose of valsartan, and we've seen some good reductions in BP (last week's average was 153/96).     Will f/u on patient BP readings in 1 week.    Carol Malone, Pharm.D., FCCP, BCPS

## 2022-05-20 ENCOUNTER — TELEPHONE (OUTPATIENT)
Dept: PHARMACY | Facility: CLINIC | Age: 72
End: 2022-05-20
Payer: MEDICARE

## 2022-05-20 DIAGNOSIS — I10 HYPERTENSION, GOAL BELOW 140/90: Primary | ICD-10-CM

## 2022-05-20 NOTE — CONFIDENTIAL NOTE
Called patient to f/u on BP as part of mGlide study.       Home BP's are running 157/96 on average, pasted below.  Patient is on valsartan 320mg daily (increased on 5/5/22)  and reports good  adherence. Tolerating medications well.      BP's were higher this week;  Patient reports he had some nights with poor sleep and thinks that may have contributed.   Reports his personal home BP cuff was lower 140s/80s, but he was using that on the opposite arm later in the day.  Educated patient that if he sees differences on the same arm/same time, we can connect him with our study team to verify accuracy of the study cuff.     Patient is due for labs after the increase of valsartan from 160 to 320 mg on 5/5/22.  Scr & K ordered;  Patient to get labs before he leaves town for June travels.       Patient is not meeting average BP goal of <140/90, but we will continue to monitor and confirm normal labs before further medication adjustments.  He may benefit from a diuretic as a next step.      Will f/u on patient BP readings in 1 week.    Carol Malone, Pharm.D., [facility], BCPS

## 2022-05-27 ENCOUNTER — TELEPHONE (OUTPATIENT)
Dept: PHARMACY | Facility: CLINIC | Age: 72
End: 2022-05-27
Payer: MEDICARE

## 2022-05-27 NOTE — CONFIDENTIAL NOTE
Called patient to f/u on BP as part of mGlide study.       Home BP's are running 149/92 on average, pasted below.  Patient is on valsartan 320 mg  and reports good  adherence. Tolerating medications well.       Patient is not meeting average BP goal of <140/90, but average has improved from last week.  He is due for follow-up K & SCr after the increase in valsartan dose.  Patient educated and he indicated he will schedule an appointment to get that labwork done.  No changes recommended today.       Will f/u on patient BP readings in 1 week.    Carol Malone, Pharm.D., [facility], BCPS

## 2022-06-02 ENCOUNTER — LAB (OUTPATIENT)
Dept: LAB | Facility: CLINIC | Age: 72
End: 2022-06-02
Payer: MEDICARE

## 2022-06-02 DIAGNOSIS — I10 HYPERTENSION, GOAL BELOW 140/90: ICD-10-CM

## 2022-06-02 LAB
CREAT SERPL-MCNC: 1.23 MG/DL (ref 0.66–1.25)
GFR SERPL CREATININE-BSD FRML MDRD: 62 ML/MIN/1.73M2
POTASSIUM BLD-SCNC: 4.5 MMOL/L (ref 3.4–5.3)

## 2022-06-02 PROCEDURE — 82565 ASSAY OF CREATININE: CPT

## 2022-06-02 PROCEDURE — 84132 ASSAY OF SERUM POTASSIUM: CPT

## 2022-06-02 PROCEDURE — 36415 COLL VENOUS BLD VENIPUNCTURE: CPT

## 2022-06-03 ENCOUNTER — TELEPHONE (OUTPATIENT)
Dept: PHARMACY | Facility: CLINIC | Age: 72
End: 2022-06-03
Payer: MEDICARE

## 2022-06-03 DIAGNOSIS — I10 HYPERTENSION, GOAL BELOW 140/90: ICD-10-CM

## 2022-06-03 DIAGNOSIS — I10 HYPERTENSION, GOAL BELOW 140/90: Primary | ICD-10-CM

## 2022-06-03 RX ORDER — AMLODIPINE BESYLATE 5 MG/1
5 TABLET ORAL DAILY
Qty: 30 TABLET | Refills: 3 | Status: SHIPPED | OUTPATIENT
Start: 2022-06-03 | End: 2022-06-06

## 2022-06-03 RX ORDER — AMLODIPINE BESYLATE 5 MG/1
5 TABLET ORAL DAILY
Qty: 30 TABLET | Refills: 3 | OUTPATIENT
Start: 2022-06-03

## 2022-06-03 NOTE — TELEPHONE ENCOUNTER
[facility]    PharmD Called patient to f/u on BP as [facility].       Home BP's are running 147/90 on average, with a max of 159/95 and a min of 139/79.    Medications:   1. Valsartan 320 mg daily AM  - Of note, patient previously did not tolerate metoprolol (bradycardia), also avoiding diuretic due to history of gout. No history of CCB use per chart review.  - Patient reports good medication adherence, misses doses rarely, maybe once monthly.  Tolerating medication well without any side effects.    Patient is not meeting BP goal of <140/90. Recommend adding amlodipine 5 mg daily. Patient agreed to changes and orders placed per CPA with Adam Han. Reviewed with patient today side effects to monitor for.     Will f/u on patient BP readings in 1 week.       Abida Brooks, PharmD, BCACP

## 2022-06-06 RX ORDER — AMLODIPINE BESYLATE 5 MG/1
5 TABLET ORAL DAILY
Qty: 30 TABLET | Refills: 3 | Status: SHIPPED | OUTPATIENT
Start: 2022-06-06 | End: 2022-07-29

## 2022-06-06 NOTE — TELEPHONE ENCOUNTER
Received notification from patient that amlodipine was not able able to be filled (unknown reason). Resent prescription today with CPA per Adam Han, PharmD, BCACP  Medication Therapy Management Pharmacist  
Detail Level: Detailed

## 2022-06-10 ENCOUNTER — TELEPHONE (OUTPATIENT)
Dept: PHARMACY | Facility: CLINIC | Age: 72
End: 2022-06-10
Payer: MEDICARE

## 2022-06-10 NOTE — TELEPHONE ENCOUNTER
[facility]    PharmD Called patient to f/u on BP [facility]       Home BP's are running 144/90 on average, with a max of 154/93 and a min of 137/84.    Medications:   1. Valsartan 320 mg daily AM  2. Amlodipine 5 mg daily PM (been taking for 3-4 days)  - Patient reports good medication adherence.  Tolerating medication well without any side effects.  - Of note, patient previously did not tolerate metoprolol (bradycardia), also avoiding diuretic due to history of gout.     Patient is not meeting BP goal of <140/90. Patient recently  added amlodipine 5 mg daily, therefore will continue to monitor BP for now.    Will f/u on patient BP readings in 1 week.        Abida Brooks, PharmD, BCACP

## 2022-06-17 ENCOUNTER — TELEPHONE (OUTPATIENT)
Dept: PHARMACY | Facility: CLINIC | Age: 72
End: 2022-06-17
Payer: MEDICARE

## 2022-06-17 NOTE — TELEPHONE ENCOUNTER
[place] [facility]    [place]armD Called patient to f[facility]u [facility]       Home BP's are running 140/87 on average, with a max of 147/92 and a min of 131/82.    Medications:   1. Valsartan 320 mg daily AM  2. Amlodipine 5 mg daily PM  - Patient reports good medication adherence.  Tolerating medication well without any side effects.  - Of note, patient previously did not tolerate metoprolol (bradycardia), also avoiding diuretic due to history of gout.   - This Sunday leaving for 3 weeks.     Patient is not meeting BP goal of <140/90, though very close to goal and BP continues to improve over the last 2 weeks. Patient recently  added amlodipine 5 mg daily, therefore will continue to monitor BP for now.    Will f/u on patient BP readings in 1 week.        Abida Brooks, PharmD, BCACP

## 2022-06-24 ENCOUNTER — TELEPHONE (OUTPATIENT)
Dept: PHARMACY | Facility: CLINIC | Age: 72
End: 2022-06-24

## 2022-06-24 NOTE — TELEPHONE ENCOUNTER
[place] [facility]                            PharmD called patient to f[facility]    Unable to reach patient x2. Left  for return call.      Home BP's are running 138/86 on average, with a max of 149/91 and a min of 127/81.    Medications:   1. Valsartan 320 mg daily AM  2. Amlodipine 5 mg daily PM  - Patient reports good medication adherence.  Tolerating medication well without any side effects.    Patient is meeting BP goal of <140/90, and is <130/80 on several occasions.  No changes recommended today.       Will f/u on patient BP readings in 1 week.       Abida Brooks, PharmD, BCACP

## 2022-07-08 ENCOUNTER — TELEPHONE (OUTPATIENT)
Dept: PHARMACY | Facility: CLINIC | Age: 72
End: 2022-07-08

## 2022-07-08 NOTE — TELEPHONE ENCOUNTER
[place]: [facility]                            PharmD called patient to f/u on BP as part of mGlide study.    Unable to reach patient x2. Left  for return call.      Home BP's are running 141/86 on average.    Medications:   1. Valsartan 320 mg daily AM  2. Amlodipine 5 mg daily PM  - Patient reports good medication adherence.  Tolerating medication well without any side effects.    Patient is not meeting BP goal of <140/90, though improved from last week.  Acceptable to continue current therapy and monitor for 1 more week before making additional changes. No changes recommended today.  Could consider dose increase of amlodipine next week if needed.  Will f/u on patient BP readings in 1 week.       Abida Brooks, PharmD, BCACP         
PAST SURGICAL HISTORY:  History of tympanostomy tube placement 8/18

## 2022-07-27 ENCOUNTER — TELEPHONE (OUTPATIENT)
Dept: FAMILY MEDICINE | Facility: CLINIC | Age: 72
End: 2022-07-27

## 2022-07-27 NOTE — TELEPHONE ENCOUNTER
Called pt and LVM. Unfortunately Dr. Soliz is out for the remainder of the week and pt needs to reschedule appt that was for 7/29-video visit. He can do this video visit with another provider if he is matt with that, Carmen Trujillo has a ton of virtual openings on 7/28

## 2022-07-29 ENCOUNTER — TELEPHONE (OUTPATIENT)
Dept: PHARMACY | Facility: CLINIC | Age: 72
End: 2022-07-29

## 2022-07-29 DIAGNOSIS — I10 HYPERTENSION, GOAL BELOW 140/90: ICD-10-CM

## 2022-07-29 RX ORDER — AMLODIPINE BESYLATE 5 MG/1
5 TABLET ORAL DAILY
Qty: 90 TABLET | Refills: 1 | Status: SHIPPED | OUTPATIENT
Start: 2022-07-29 | End: 2022-09-23

## 2022-07-29 NOTE — TELEPHONE ENCOUNTER
Hypertension Research Study: [facility]                            PharmD called patient to f/u on BP as part of [facility] study.       Home BP's are running 131/79 on average.    Medications:   1. Valsartan 320 mg daily AM  2. Amlodipine 5 mg daily PM  - Patient reports good medication adherence.  Tolerating medication well without any side effects.    Patient is meeting BP goal of <140/90. No changes recommended today. Sent refill of amlodipine 5 mg prescription to [facility] for 90 day supply today with CPA per Adam Han MD.   Will f/u on patient BP readings in 1 week.       Abida Brooks, PharmD, BCACP

## 2022-08-02 ENCOUNTER — VIRTUAL VISIT (OUTPATIENT)
Dept: FAMILY MEDICINE | Facility: CLINIC | Age: 72
End: 2022-08-02
Payer: MEDICARE

## 2022-08-02 DIAGNOSIS — I10 HYPERTENSION, GOAL BELOW 140/90: ICD-10-CM

## 2022-08-02 DIAGNOSIS — H93.8X3 PLUGGED FEELING IN EAR, BILATERAL: Primary | ICD-10-CM

## 2022-08-02 PROCEDURE — 99213 OFFICE O/P EST LOW 20 MIN: CPT | Mod: 95 | Performed by: INTERNAL MEDICINE

## 2022-08-02 NOTE — PROGRESS NOTES
Gary is a 72 year old who is being evaluated via a billable video visit.      How would you like to obtain your AVS? MyChart  If the video visit is dropped, the invitation should be resent by: Text to cell phone: [phone]  Will anyone else be joining your video visit? No        Assessment & Plan     1.  Plugged feeling in ears bilateral since SARS-CoV-2 viral infection on 06/22/2022.  Could be eustachian tube dysfunction.  Advised to try antihistamine or decongestant such as Claritin-D or Zyrtec-D.  The patient needs to be seen in person so the ears could be examined with the autoscope.  We will try to set up an appointment next week.  2.  Essential hypertension under control        Return in about 1 week (around 8/9/2022) for Routine Visit.    Adam Soliz MD  [facility]    Brock Vega is a 72 year old, presenting for the following health issues:  No chief complaint on file.      History of Present Illness       Reason for visit:  Hearing impaired ever since having COVID symptoms on June 22.  Symptom onset:  More than a month  Symptoms include:  Ears feel clogged, similar to swimmer's ear.  Symptom intensity:  Moderate  Symptom progression:  Staying the same  What makes it worse:  Unsure  What makes it better:  Unsure    He eats 4 or more servings of fruits and vegetables daily.He consumes 0 sweetened beverage(s) daily.He exercises with enough effort to increase his heart rate 60 or more minutes per day.  He exercises with enough effort to increase his heart rate 4 days per week.   He is taking medications regularly.     77-year-old gentleman with history of hypertension, chronic kidney disease stage III and mixed dyslipidemia had SARS-CoV-2 infection on 6/22/2022.  He was in [place], [place] at that time.  He had a positive COVID test.  He had runny nose and head congestion that lasted about a week and resolved.  But since then he has had persistent feeling of plugged ears  bilaterally.  No fever or chills.          Review of Systems   Constitutional, HEENT, cardiovascular, pulmonary, GI, , musculoskeletal, neuro, skin, endocrine and psych systems are negative, except as otherwise noted.      Objective           Vitals:  No vitals were obtained today due to virtual visit.    Physical Exam   GENERAL: Healthy, alert and no distress  EYES: Eyes grossly normal to inspection.  No discharge or erythema, or obvious scleral/conjunctival abnormalities.  RESP: No audible wheeze, cough, or visible cyanosis.  No visible retractions or increased work of breathing.    SKIN: Visible skin clear. No significant rash, abnormal pigmentation or lesions.  NEURO: Cranial nerves grossly intact.  Mentation and speech appropriate for age.  PSYCH: Mentation appears normal, affect normal/bright, judgement and insight intact, normal speech and appearance well-groomed.                Video-Visit Details    Video Start Time: 8:45 AM    Type of service:  Video Visit    Video End Time:8:59 AM    Originating Location (pt. Location): Home    Distant Location (provider location):  [facility]     Platform used for Video Visit: WirelessGate  Nati.

## 2022-08-09 ENCOUNTER — OFFICE VISIT (OUTPATIENT)
Dept: FAMILY MEDICINE | Facility: CLINIC | Age: 72
End: 2022-08-09
Payer: MEDICARE

## 2022-08-09 VITALS
BODY MASS INDEX: 39.54 KG/M2 | OXYGEN SATURATION: 96 % | RESPIRATION RATE: 18 BRPM | WEIGHT: 253.7 LBS | HEART RATE: 83 BPM | TEMPERATURE: 98.7 F | SYSTOLIC BLOOD PRESSURE: 126 MMHG | DIASTOLIC BLOOD PRESSURE: 78 MMHG

## 2022-08-09 DIAGNOSIS — H93.8X3 SENSATION OF PLUGGED EAR ON BOTH SIDES: Primary | ICD-10-CM

## 2022-08-09 DIAGNOSIS — H69.93 DYSFUNCTION OF BOTH EUSTACHIAN TUBES: ICD-10-CM

## 2022-08-09 DIAGNOSIS — R41.3 MEMORY LOSS: ICD-10-CM

## 2022-08-09 PROCEDURE — 99214 OFFICE O/P EST MOD 30 MIN: CPT | Performed by: INTERNAL MEDICINE

## 2022-08-09 ASSESSMENT — PAIN SCALES - GENERAL: PAINLEVEL: NO PAIN (0)

## 2022-08-09 NOTE — PROGRESS NOTES
Assessment & Plan     1.  Sensation of plugged ears both since COVID 6 weeks ago.  Most likely related to #2.   2.  Eustachian tube dysfunction bilateral suspected.  Will refer to ENT and audiology.  3.  Memory loss concern.  Did a Mini-Mental status exam today scored 30 out of 30.  Informed patient and wife that this is a screening test and just because it was normal does not mean he does not have an issue.  Recommend that we monitor and reassess 6 months from now in a year from now.          Return in about 4 weeks (around 9/6/2022) for prn.    Adam Soliz MD  [facility] VIKTOR Vega is a 72 year old, presenting for the following health issues:  Ear Problem      HPI     Concern - Bilateral ear plugged  Onset: 6 weeks ago  Description: feels like ears are filled with water. Did have Covid and it seemed like a normal cold but the ear problem never went away  Intensity: moderate  Progression of Symptoms:  same  Accompanying Signs & Symptoms: no  Previous history of similar problem: yes before covid about 2 years ago and was treated for an ear infection  Precipitating factors:        Worsened by: had covid  Alleviating factors:        Improved by: nothing  Therapies tried and outcome: claritin but that did not help    Patient is accompanied by his wife.  He got COVID about 6 weeks ago and since then his ears have been plugged.  He took decongestant and that did not help.  No fever or chills.  No ear pain.  The other issue is of memory concern.  His wife really brought it up.  She has noticed that he at times does struggle finding words.  He uses GPS although at times he does not get lost but she indicates once she gets a direction and had to repeated several times.  Most recently she bought a stack of 2 different types of shirts and told him to try 1 of each.  He concluded that she wanted him to try all of the shirts.  If she tells him to do something she has to keep it he has  signed into no more than 2 task at a time.  He otherwise will not remember.  He has tough time with names and appears to have some word finding issues.  Patient acknowledges that he does have this issue.    Review of Systems   Negative except for as stated in history of present illness      Objective    There were no vitals taken for this visit.  There is no height or weight on file to calculate BMI.  Physical Exam   GENERAL: healthy, alert and no distress  HENT: normal cephalic/atraumatic,  nose and mouth without ulcers or lesions, oropharynx clear, oral mucous membranes moist.  Both auditory canals show minimal amount of wax.  The tympanic membrane looks normal.  There is no redness or inflammation or bulging.  However with insufflation of air, the TM did not move.  NECK: no adenopathy, no asymmetry, masses, or scars and thyroid normal to palpation                    .  ..

## 2022-08-19 ENCOUNTER — TELEPHONE (OUTPATIENT)
Dept: PHARMACY | Facility: CLINIC | Age: 72
End: 2022-08-19

## 2022-08-19 NOTE — TELEPHONE ENCOUNTER
[place]: [facility]                            PharmD called patient to f/u on BP as part of [facility].       Home BP's are running 135/79 on average.    Medications:   1. Valsartan 320 mg daily AM  2. Amlodipine 5 mg daily PM  - Patient reports good medication adherence.  Tolerating medication well without any side effects.    Patient is meeting BP goal of <140/90. No changes recommended today.   Will f/u on patient BP readings in 1 week.       Abida Brooks, PharmD, BCACP

## 2022-08-26 ENCOUNTER — TELEPHONE (OUTPATIENT)
Dept: PHARMACY | Facility: CLINIC | Age: 72
End: 2022-08-26

## 2022-08-26 NOTE — TELEPHONE ENCOUNTER
[place] [facility]                            PharmD called patient to f/u on BP as part of [facility] [place].       Home BP's are running 139/82 on average.    Medications:   1. Valsartan 320 mg daily AM  2. Amlodipine 5 mg daily PM  - Patient reports good medication adherence.  Tolerating medication well without any side effects.  - Patient states that he is down in [place] on vacation. Endorsing that he has been dining out every day and getting less sleep than normal which could be the cause of higher BP.     Patient is meeting BP goal of <140/90. No changes recommended today.   Will f/u on patient BP readings in 1 week.       Abida Brooks, PharmD, BCACP

## 2022-09-02 ENCOUNTER — TELEPHONE (OUTPATIENT)
Dept: PHARMACY | Facility: CLINIC | Age: 72
End: 2022-09-02

## 2022-09-02 NOTE — TELEPHONE ENCOUNTER
[place] [facility]                            PharmD called patient to f/u on BP [facility] [place] [facility].       Home BP's are running 139/85 on average.    Medications:   1. Valsartan 320 mg daily AM  2. Amlodipine 5 mg daily PM  - Patient reports good medication adherence.  Tolerating medication well without any side effects.    Patient is meeting BP goal of <140/90. No changes recommended today.   Will f/u on patient BP readings in 1 week.       Abida Brooks, PharmD, BCACP

## 2022-09-09 ENCOUNTER — TELEPHONE (OUTPATIENT)
Dept: PHARMACY | Facility: CLINIC | Age: 72
End: 2022-09-09

## 2022-09-09 NOTE — TELEPHONE ENCOUNTER
[place] [facility]                            PharmD called patient to f/u on BP as part [place] [facility] [place].       Home BP's are running 143/83 on average. Readings included: 144/73, 126/74, 136/81, 153/91, 161/94, 150/88.     Medications:   1. Valsartan 320 mg daily AM  2. Amlodipine 5 mg daily PM  - Patient reports good medication adherence.  Tolerating medication well without any side effects.  - Family reunion lead to lack of sleep and alcohol consumption which he thinks contributed to the higher BP this week.     Patient is not meeting BP goal of <140/90. Although, patients BP was within goal last week and notes that this week the readings were likely situational. Acceptable to continue monitoring this week. Though if elevated next week will recommend dose increase of amlodipine to 10 mg daily.   Will f/u on patient BP readings in 1 week.       Abida Brooks, PharmD, BCACP

## 2022-09-16 ENCOUNTER — TELEPHONE (OUTPATIENT)
Dept: PHARMACY | Facility: CLINIC | Age: 72
End: 2022-09-16

## 2022-09-16 NOTE — TELEPHONE ENCOUNTER
[place] [facility]                            PharmD called patient to f/u on BP as part of [facility] [place].       Home BP's are running 138/79 on average.     Medications:   1. Valsartan 320 mg daily AM  2. Amlodipine 5 mg daily PM  - Patient reports good medication adherence.  Tolerating medication well without any side effects.    Patient is meeting BP goal of <140/90. No changes recommended today.   Will f/u on patient BP readings in 1 week.       Abida Brooks, PharmD, BCACP

## 2022-09-23 ENCOUNTER — TELEPHONE (OUTPATIENT)
Dept: PHARMACY | Facility: CLINIC | Age: 72
End: 2022-09-23

## 2022-09-23 DIAGNOSIS — I10 HYPERTENSION, GOAL BELOW 140/90: ICD-10-CM

## 2022-09-23 RX ORDER — AMLODIPINE BESYLATE 5 MG/1
5 TABLET ORAL DAILY
Qty: 90 TABLET | Refills: 1 | Status: SHIPPED | OUTPATIENT
Start: 2022-09-23 | End: 2023-03-24

## 2022-09-23 NOTE — TELEPHONE ENCOUNTER
Hypertension Research Study: [facility]                            PharmD called patient as a final f/u on BP as part of mGlide study.       Home BP's are running 135/81 on average.     Medications:   1. Valsartan 320 mg daily AM  2. Amlodipine 5 mg daily PM  - Patient reports good medication adherence.  Tolerating medication well without any side effects.    Patient is meeting BP goal of <140/90. No changes recommended today. A refill of patients amlodipine was sent to his pharmacy today for continuation with approval via CPA from Adam Han   - Since this is the last clinical intervention visit of the mGlide study, this note will be routed to patients PCP today for continued care.      Abida Brooks, MichelleD, BCACP

## 2022-09-29 ENCOUNTER — OFFICE VISIT (OUTPATIENT)
Dept: OTOLARYNGOLOGY | Facility: CLINIC | Age: 72
End: 2022-09-29
Payer: MEDICARE

## 2022-09-29 ENCOUNTER — OFFICE VISIT (OUTPATIENT)
Dept: AUDIOLOGY | Facility: CLINIC | Age: 72
End: 2022-09-29
Attending: INTERNAL MEDICINE
Payer: MEDICARE

## 2022-09-29 VITALS
WEIGHT: 253 LBS | HEART RATE: 102 BPM | SYSTOLIC BLOOD PRESSURE: 127 MMHG | BODY MASS INDEX: 39.43 KG/M2 | DIASTOLIC BLOOD PRESSURE: 83 MMHG

## 2022-09-29 DIAGNOSIS — H93.8X3 SENSATION OF PLUGGED EAR ON BOTH SIDES: Primary | ICD-10-CM

## 2022-09-29 DIAGNOSIS — H93.8X3 SENSATION OF PLUGGED EAR ON BOTH SIDES: ICD-10-CM

## 2022-09-29 DIAGNOSIS — H90.3 SNHL (SENSORY-NEURAL HEARING LOSS), ASYMMETRICAL: ICD-10-CM

## 2022-09-29 DIAGNOSIS — H90.3 SENSORINEURAL HEARING LOSS, BILATERAL: ICD-10-CM

## 2022-09-29 PROCEDURE — 99203 OFFICE O/P NEW LOW 30 MIN: CPT | Performed by: OTOLARYNGOLOGY

## 2022-09-29 PROCEDURE — 92557 COMPREHENSIVE HEARING TEST: CPT | Performed by: AUDIOLOGIST

## 2022-09-29 PROCEDURE — 92550 TYMPANOMETRY & REFLEX THRESH: CPT | Performed by: AUDIOLOGIST

## 2022-09-29 RX ORDER — PREDNISONE 20 MG/1
TABLET ORAL
Qty: 20 TABLET | Refills: 0 | Status: SHIPPED | OUTPATIENT
Start: 2022-09-29 | End: 2023-06-06

## 2022-09-29 NOTE — PROGRESS NOTES
AUDIOLOGY REPORT:    Patient was referred from ENT by Yusuf Li MD for audiology evaluation. Patient reports sensation of plugged ears for the past couple of months, after having Covid 19.  He denies otalgia, tinnitus, otorrhea and noise exposure but there is a family history of hearing loss.    Testing:    Otoscopy:   Otoscopic exam indicates partial obstruction with cerumen bilaterally     Tympanograms:    RIGHT: normal eardrum mobility     LEFT:   normal eardrum mobility    Reflexes (reported by stimulus ear):  RIGHT: Ipsilateral is absent at frequencies tested  RIGHT: Contralateral is absent at frequencies tested  LEFT:   Ipsilateral is absent at frequencies tested  LEFT:   Contralateral is absent at frequencies tested    Thresholds:   Pure Tone Thresholds assessed using conventional audiometry with good reliability from 250-8000 Hz bilaterally using insert earphones and circumaural headphones      RIGHT:  normal from 250-2000 Hz sloping to mild-moderate sensorineural hearing loss    LEFT:    normal from 250-2000 Hz sloping to mild-moderate sensorineural hearing loss    Speech Reception Threshold:    RIGHT: 0 dB HL    LEFT:   5 dB HL  Speech Reception Thresholds are in good agreement with pure tone thresholds.    Word Recognition Score:     RIGHT: 100% at 50 dB HL using NU-6 recorded word list.    LEFT:   100% at 50 dB HL using NU-6 recorded word list.    Discussed results with the patient. Patient is a candidate for amplification and a hearing aid consultation is recommended at such time he feels he is having communication problems.    Patient was returned to ENT for follow up.     Familia Canales MA, CCC-A  Licensed Audiologist #[id number]  9/29/2022

## 2022-09-29 NOTE — PROGRESS NOTES
"Chief Complaint - plugged ear    History of Present Illness - Driss Pope is a 72 year old male who presents to me today with pressure or a plugged feeling in both ears. It started after COVID in June. There is no history of chronic ear disease or ear surgery. No pain. No vertigo. 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 The patient has tried flonase, antihistamine decongestant, but these things have not helped. He did have some crackling. It has gotten a little better.     Tests personally reviewed today for this visit:   1.) hgb A1C was normal 4/2022.  2.) BMP 4/26/22 was normal aside from 102   3.) audiogram see below     Past Medical History -   Patient Active Problem List   Diagnosis     CKD (chronic kidney disease) stage 3, GFR 30-59 ml/min (H)     Advanced directives, counseling/discussion     Abnormal PSA     ED (erectile dysfunction)     Benign non-nodular prostatic hyperplasia with lower urinary tract symptoms     Hypertension, goal below 140/90     Class 2 severe obesity due to excess calories with serious comorbidity in adult (H)     Olecranon bursitis, left elbow     Acute gout     Impingement syndrome of both shoulders     Mixed dyslipidemia     IFG (impaired fasting glucose)       Current Medications -   Current Outpatient Medications:      amLODIPine (NORVASC) 5 MG tablet, Take 1 tablet (5 mg) by mouth daily, Disp: 90 tablet, Rfl: 1     atorvastatin (LIPITOR) 20 MG tablet, TAKE 1 TABLET DAILY, Disp: 90 tablet, Rfl: 3     Krill Oil 1000 MG CAPS, Take by mouth daily 750 mg to 1000 mg daily, Disp: , Rfl:      Misc Natural Products (GLUCOSAMINE CHONDROITIN ADV PO), 2-3 tablets daily \"Move free pills\", Disp: , Rfl:      Multiple Vitamins-Minerals (MULTIVITAL PO), Once daily, Disp: , Rfl:      valsartan (DIOVAN) 320 MG tablet, Take 1 tablet (320 mg) by mouth daily, Disp: 90 tablet, Rfl: 3     Vitamin D3 (CHOLECALCIFEROL) 125 MCG (5000 UT) tablet, Take 1 tablet by " mouth daily, Disp: , Rfl:     Allergies -   Allergies   Allergen Reactions     Lisinopril Cough     Persistent cough with Lisinopril       Social History -   Social History     Socioeconomic History     Marital status:    Tobacco Use     Smoking status: Never Smoker     Smokeless tobacco: Never Used   Vaping Use     Vaping Use: Never used   Substance and Sexual Activity     Alcohol use: Yes     Comment: occasionally     Drug use: No     Sexual activity: Yes     Partners: Female     Birth control/protection: Condom     Comment: no protection   Other Topics Concern     Parent/sibling w/ CABG, MI or angioplasty before 65F 55M? No      Service No     Blood Transfusions No     Caffeine Concern No     Occupational Exposure No     Hobby Hazards No     Sleep Concern No     Comment: Does work at night     Stress Concern No     Weight Concern Yes     Comment: Overweight and needing weight loss     Special Diet Yes     Comment: Renal diet to be instructed     Back Care No     Exercise Yes     Comment: dancing mutiple times per week plus home work outs     Bike Helmet No     Comment: NA     Seat Belt Yes     Self-Exams Yes       Family History -   Family History   Problem Relation Age of Onset     Alzheimer Disease Mother      Hypertension Mother      Heart Disease Father         CHF     Alzheimer Disease Paternal Grandmother      Alzheimer Disease Paternal Grandfather      Cancer Brother         esophageal cancer     Prostate Cancer Brother      Diabetes Brother      Prostate Cancer Brother      Diabetes Sister      Unknown/Adopted Son         adopted     Asthma No family hx of      C.A.D. No family hx of      Cerebrovascular Disease No family hx of      Breast Cancer No family hx of      Cancer - colorectal No family hx of      Alcohol/Drug No family hx of      Allergies No family hx of      Anesthesia Reaction No family hx of      Arthritis No family hx of      Blood Disease No family hx of      Cardiovascular  No family hx of      Circulatory No family hx of      Congenital Anomalies No family hx of      Connective Tissue Disorder No family hx of      Depression No family hx of      Endocrine Disease No family hx of      Genetic Disorder No family hx of      Eye Disorder No family hx of      Gastrointestinal Disease No family hx of      Genitourinary Problems No family hx of      Gynecology No family hx of      Lipids No family hx of      Musculoskeletal Disorder No family hx of      Neurologic Disorder No family hx of      Obesity No family hx of      Osteoporosis No family hx of      Psychotic Disorder No family hx of      Respiratory No family hx of      Thyroid Disease No family hx of      Family History Negative No family hx of      Hearing Loss No family hx of      Substance Abuse No family hx of      Mental Illness No family hx of      Anxiety Disorder No family hx of      Hyperlipidemia No family hx of      Coronary Artery Disease No family hx of        Physical Exam  /83   Pulse 102   Wt 114.8 kg (253 lb)   BMI 39.43 kg/m    General - The patient is nontoxic, in no distress.  Alert and oriented to person and place, answers questions and cooperates with examination appropriately.   Voice and Breathing - The patient was breathing comfortably without the use of accessory muscles. There was no wheezing, stridor, or stertor.  The patients voice was clear and strong.  Ears - The ear canals are clean. The tympanic membrane on the right is intact, not retracted, no obvious middle ear effusion. No acute infection.  No fluid or purulence was seen in the external canal. The tympanic membrane on the left is intact, no middle ear effusion. No acute infection.  No fluid or purulence was seen in the external canal.   Eyes - Extraocular movements intact. Sclera were not icteric or injected.  Mouth - Examination of the oral cavity showed pink, healthy mucosa. No lesions or ulcerations noted.  The tongue was mobile and  midline.  Throat - The walls of the oropharynx were smooth, symmetric, and had no lesions or ulcerations.  The uvula was midline on elevation.    Neck - Soft, non-tender. Palpation of the occipital, submental, submandibular, internal jugular chain, and supraclavicular nodes did not demonstrate any abnormal lymph nodes or masses. No parotid masses. Palpation of the thyroid was soft and smooth, with no nodules or goiter appreciated.  The trachea was mobile and midline.  Neurological - Cranial nerves 2 through 12 were grossly intact. House-Brackmann grade 1 out of 6 bilaterally.      Audiologic Studies - An audiogram and tympanogram were performed today as part of the evaluation and personally reviewed. The tympanogram shows a type A, low volume on both sides.  The audiogram showed down-sloping sensorineural hearing loss, slightly worse left ear.       Assessment and Plan - Driss Pope is a 72 year old male who presents to me today with bilateral plugged ears.  This is most consistent with sensorineural hearing loss, possibly sudden with COVID-19 infection 4 months ago. Mastoid fluid is also possible. I recommend a prednisone burst and taper. Consider MRI brain due to asymmetric sensorineural hearing loss. Recheck hearing in 6-12 months.     Migue Li MD  Otolaryngology  [facility]

## 2022-09-29 NOTE — LETTER
"    9/29/2022         RE: Driss Pope  [address]  [place] [address]        Dear Colleague,    Thank you for referring your patient, Driss Pope, to the [facility]. Please see a copy of my visit note below.    Chief Complaint - plugged ear    History of Present Illness - Driss Pope is a 72 year old male who presents to me today with pressure or a plugged feeling in both ears. It started after COVID in June. There is no history of chronic ear disease or ear surgery. No pain. No vertigo. 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 The patient has tried flonase, antihistamine decongestant, but these things have not helped. He did have some crackling. It has gotten a little better.     Tests personally reviewed today for this visit:   1.) hgb A1C was normal 4/2022.  2.) BMP 4/26/22 was normal aside from 102   3.) audiogram see below     Past Medical History -   Patient Active Problem List   Diagnosis     CKD (chronic kidney disease) stage 3, GFR 30-59 ml/min (H)     Advanced directives, counseling/discussion     Abnormal PSA     ED (erectile dysfunction)     Benign non-nodular prostatic hyperplasia with lower urinary tract symptoms     Hypertension, goal below 140/90     Class 2 severe obesity due to excess calories with serious comorbidity in adult (H)     Olecranon bursitis, left elbow     Acute gout     Impingement syndrome of both shoulders     Mixed dyslipidemia     IFG (impaired fasting glucose)       Current Medications -   Current Outpatient Medications:      amLODIPine (NORVASC) 5 MG tablet, Take 1 tablet (5 mg) by mouth daily, Disp: 90 tablet, Rfl: 1     atorvastatin (LIPITOR) 20 MG tablet, TAKE 1 TABLET DAILY, Disp: 90 tablet, Rfl: 3     Krill Oil 1000 MG CAPS, Take by mouth daily 750 mg to 1000 mg daily, Disp: , Rfl:      Misc Natural Products (GLUCOSAMINE CHONDROITIN ADV PO), 2-3 tablets daily \"Move free pills\", Disp: , Rfl: "      Multiple Vitamins-Minerals (MULTIVITAL PO), Once daily, Disp: , Rfl:      valsartan (DIOVAN) 320 MG tablet, Take 1 tablet (320 mg) by mouth daily, Disp: 90 tablet, Rfl: 3     Vitamin D3 (CHOLECALCIFEROL) 125 MCG (5000 UT) tablet, Take 1 tablet by mouth daily, Disp: , Rfl:     Allergies -   Allergies   Allergen Reactions     Lisinopril Cough     Persistent cough with Lisinopril       Social History -   Social History     Socioeconomic History     Marital status:    Tobacco Use     Smoking status: Never Smoker     Smokeless tobacco: Never Used   Vaping Use     Vaping Use: Never used   Substance and Sexual Activity     Alcohol use: Yes     Comment: occasionally     Drug use: No     Sexual activity: Yes     Partners: Female     Birth control/protection: Condom     Comment: no protection   Other Topics Concern     Parent/sibling w/ CABG, MI or angioplasty before 65F 55M? No      Service No     Blood Transfusions No     Caffeine Concern No     Occupational Exposure No     Hobby Hazards No     Sleep Concern No     Comment: Does work at night     Stress Concern No     Weight Concern Yes     Comment: Overweight and needing weight loss     Special Diet Yes     Comment: Renal diet to be instructed     Back Care No     Exercise Yes     Comment: dancing mutiple times per week plus home work outs     Bike Helmet No     Comment: NA     Seat Belt Yes     Self-Exams Yes       Family History -   Family History   Problem Relation Age of Onset     Alzheimer Disease Mother      Hypertension Mother      Heart Disease Father         CHF     Alzheimer Disease Paternal Grandmother      Alzheimer Disease Paternal Grandfather      Cancer Brother         esophageal cancer     Prostate Cancer Brother      Diabetes Brother      Prostate Cancer Brother      Diabetes Sister      Unknown/Adopted Son         adopted     Asthma No family hx of      C.A.D. No family hx of      Cerebrovascular Disease No family hx of      Breast  Cancer No family hx of      Cancer - colorectal No family hx of      Alcohol/Drug No family hx of      Allergies No family hx of      Anesthesia Reaction No family hx of      Arthritis No family hx of      Blood Disease No family hx of      Cardiovascular No family hx of      Circulatory No family hx of      Congenital Anomalies No family hx of      Connective Tissue Disorder No family hx of      Depression No family hx of      Endocrine Disease No family hx of      Genetic Disorder No family hx of      Eye Disorder No family hx of      Gastrointestinal Disease No family hx of      Genitourinary Problems No family hx of      Gynecology No family hx of      Lipids No family hx of      Musculoskeletal Disorder No family hx of      Neurologic Disorder No family hx of      Obesity No family hx of      Osteoporosis No family hx of      Psychotic Disorder No family hx of      Respiratory No family hx of      Thyroid Disease No family hx of      Family History Negative No family hx of      Hearing Loss No family hx of      Substance Abuse No family hx of      Mental Illness No family hx of      Anxiety Disorder No family hx of      Hyperlipidemia No family hx of      Coronary Artery Disease No family hx of        Physical Exam  /83   Pulse 102   Wt 114.8 kg (253 lb)   BMI 39.43 kg/m    General - The patient is nontoxic, in no distress.  Alert and oriented to person and place, answers questions and cooperates with examination appropriately.   Voice and Breathing - The patient was breathing comfortably without the use of accessory muscles. There was no wheezing, stridor, or stertor.  The patients voice was clear and strong.  Ears - The ear canals are clean. The tympanic membrane on the right is intact, not retracted, no obvious middle ear effusion. No acute infection.  No fluid or purulence was seen in the external canal. The tympanic membrane on the left is intact, no middle ear effusion. No acute infection.  No  fluid or purulence was seen in the external canal.   Eyes - Extraocular movements intact. Sclera were not icteric or injected.  Mouth - Examination of the oral cavity showed pink, healthy mucosa. No lesions or ulcerations noted.  The tongue was mobile and midline.  Throat - The walls of the oropharynx were smooth, symmetric, and had no lesions or ulcerations.  The uvula was midline on elevation.    Neck - Soft, non-tender. Palpation of the occipital, submental, submandibular, internal jugular chain, and supraclavicular nodes did not demonstrate any abnormal lymph nodes or masses. No parotid masses. Palpation of the thyroid was soft and smooth, with no nodules or goiter appreciated.  The trachea was mobile and midline.  Neurological - Cranial nerves 2 through 12 were grossly intact. House-Brackmann grade 1 out of 6 bilaterally.      Audiologic Studies - An audiogram and tympanogram were performed today as part of the evaluation and personally reviewed. The tympanogram shows a type A, low volume on both sides.  The audiogram showed down-sloping sensorineural hearing loss, slightly worse left ear.       Assessment and Plan - Driss Pope is a 72 year old male who presents to me today with bilateral plugged ears.  This is most consistent with sensorineural hearing loss, possibly sudden with COVID-19 infection 4 months ago. Mastoid fluid is also possible. I recommend a prednisone burst and taper. Consider MRI brain due to asymmetric sensorineural hearing loss. Recheck hearing in 6-12 months.     Migue Li MD  Otolaryngology  [facility]        Again, thank you for allowing me to participate in the care of your patient.        Sincerely,        Migue Li MD

## 2022-12-01 ENCOUNTER — TRANSFERRED RECORDS (OUTPATIENT)
Dept: HEALTH INFORMATION MANAGEMENT | Facility: CLINIC | Age: 72
End: 2022-12-01

## 2022-12-07 ENCOUNTER — TRANSFERRED RECORDS (OUTPATIENT)
Dept: HEALTH INFORMATION MANAGEMENT | Facility: CLINIC | Age: 72
End: 2022-12-07

## 2023-01-14 ENCOUNTER — HEALTH MAINTENANCE LETTER (OUTPATIENT)
Age: 73
End: 2023-01-14

## 2023-03-23 DIAGNOSIS — I10 HYPERTENSION, GOAL BELOW 140/90: ICD-10-CM

## 2023-03-24 DIAGNOSIS — I10 HYPERTENSION, GOAL BELOW 140/90: ICD-10-CM

## 2023-03-24 RX ORDER — AMLODIPINE BESYLATE 5 MG/1
5 TABLET ORAL DAILY
Qty: 90 TABLET | Refills: 1 | Status: SHIPPED | OUTPATIENT
Start: 2023-03-24 | End: 2023-04-17

## 2023-03-24 RX ORDER — AMLODIPINE BESYLATE 5 MG/1
TABLET ORAL
Qty: 90 TABLET | Refills: 1 | OUTPATIENT
Start: 2023-03-24

## 2023-03-24 NOTE — TELEPHONE ENCOUNTER
This refill is linked to the wrong dept. New refill created and routed to correct dept.    Margarita Salgado RN  [place] Nurse Advisor  7:35 AM  3/24/2023

## 2023-04-17 ENCOUNTER — TELEPHONE (OUTPATIENT)
Dept: FAMILY MEDICINE | Facility: CLINIC | Age: 73
End: 2023-04-17
Payer: MEDICARE

## 2023-04-17 DIAGNOSIS — I10 HYPERTENSION, GOAL BELOW 140/90: ICD-10-CM

## 2023-04-17 RX ORDER — AMLODIPINE BESYLATE 5 MG/1
5 TABLET ORAL DAILY
Qty: 90 TABLET | Refills: 0 | Status: SHIPPED | OUTPATIENT
Start: 2023-04-17 | End: 2023-08-17

## 2023-04-17 NOTE — TELEPHONE ENCOUNTER
Medication Question or Refill        What medication are you calling about (include dose and sig)?: amLODIPine (NORVASC) 5 MG tablet    Preferred Pharmacy:       [facility] [place] - [address]  [place] [address]  Phone: [phone] Fax: [phone]          Controlled Substance Agreement on file:   CSA -- Patient Level:    CSA: None found at the patient level.       Who prescribed the medication?: PCP    Do you need a refill? Yes 2 weeks left of medication pt stated, pt wondering if pcp can fill medication until June appointment    When did you use the medication last? daily    Patient offered an appointment? Yes: 6/6/23 soonest appointment available    Do you have any questions or concerns?  Yes: Pt does have a few questions about medication and muscle aches. Wondering if this medication is the cause of the muscle aches. Pt would like call back from pcp nurse to discuss. Pt did not want to speak with triage nurse at this time but would like call back from pcp team.      Could we send this information to you in [facility] or would you prefer to receive a phone call?:   Patient would prefer a phone call   Okay to leave a detailed message?: Yes at Cell number on file:    Telephone Information:   Mobile [phone]

## 2023-05-30 ASSESSMENT — ENCOUNTER SYMPTOMS
DIARRHEA: 0
HEADACHES: 0
DIZZINESS: 0
FREQUENCY: 0
HEARTBURN: 0
COUGH: 0
HEMATURIA: 0
PALPITATIONS: 0
PARESTHESIAS: 0
NAUSEA: 0
HEMATOCHEZIA: 0
MYALGIAS: 1
EYE PAIN: 0
SHORTNESS OF BREATH: 0
ABDOMINAL PAIN: 0
CHILLS: 0
ARTHRALGIAS: 1
JOINT SWELLING: 1
FEVER: 0
DYSURIA: 0
CONSTIPATION: 0
NERVOUS/ANXIOUS: 0
WEAKNESS: 0
SORE THROAT: 0

## 2023-05-30 ASSESSMENT — ACTIVITIES OF DAILY LIVING (ADL): CURRENT_FUNCTION: NO ASSISTANCE NEEDED

## 2023-05-31 ENCOUNTER — TRANSFERRED RECORDS (OUTPATIENT)
Dept: HEALTH INFORMATION MANAGEMENT | Facility: CLINIC | Age: 73
End: 2023-05-31
Payer: MEDICARE

## 2023-06-02 ENCOUNTER — HEALTH MAINTENANCE LETTER (OUTPATIENT)
Age: 73
End: 2023-06-02

## 2023-06-06 ENCOUNTER — OFFICE VISIT (OUTPATIENT)
Dept: FAMILY MEDICINE | Facility: CLINIC | Age: 73
End: 2023-06-06
Payer: MEDICARE

## 2023-06-06 VITALS
RESPIRATION RATE: 16 BRPM | TEMPERATURE: 98.1 F | WEIGHT: 263.3 LBS | DIASTOLIC BLOOD PRESSURE: 70 MMHG | BODY MASS INDEX: 39.9 KG/M2 | HEIGHT: 68 IN | HEART RATE: 72 BPM | SYSTOLIC BLOOD PRESSURE: 147 MMHG | OXYGEN SATURATION: 94 %

## 2023-06-06 DIAGNOSIS — Z00.00 MEDICARE ANNUAL WELLNESS VISIT, SUBSEQUENT: Primary | ICD-10-CM

## 2023-06-06 DIAGNOSIS — R06.83 SNORING: ICD-10-CM

## 2023-06-06 DIAGNOSIS — I49.40 PREMATURE HEARTBEATS: ICD-10-CM

## 2023-06-06 DIAGNOSIS — Z12.11 SCREEN FOR COLON CANCER: ICD-10-CM

## 2023-06-06 DIAGNOSIS — E66.01 MORBID OBESITY (H): ICD-10-CM

## 2023-06-06 DIAGNOSIS — Z23 NEED FOR TDAP VACCINATION: ICD-10-CM

## 2023-06-06 DIAGNOSIS — R73.01 IFG (IMPAIRED FASTING GLUCOSE): ICD-10-CM

## 2023-06-06 DIAGNOSIS — I10 HYPERTENSION, GOAL BELOW 140/90: ICD-10-CM

## 2023-06-06 DIAGNOSIS — E78.2 MIXED DYSLIPIDEMIA: ICD-10-CM

## 2023-06-06 DIAGNOSIS — Z86.0101 H/O ADENOMATOUS POLYP OF COLON: ICD-10-CM

## 2023-06-06 PROCEDURE — 90715 TDAP VACCINE 7 YRS/> IM: CPT | Performed by: INTERNAL MEDICINE

## 2023-06-06 PROCEDURE — 90471 IMMUNIZATION ADMIN: CPT | Performed by: INTERNAL MEDICINE

## 2023-06-06 PROCEDURE — 93000 ELECTROCARDIOGRAM COMPLETE: CPT | Performed by: INTERNAL MEDICINE

## 2023-06-06 PROCEDURE — G0439 PPPS, SUBSEQ VISIT: HCPCS | Performed by: INTERNAL MEDICINE

## 2023-06-06 PROCEDURE — 99214 OFFICE O/P EST MOD 30 MIN: CPT | Mod: 25 | Performed by: INTERNAL MEDICINE

## 2023-06-06 ASSESSMENT — ENCOUNTER SYMPTOMS
HEARTBURN: 0
SHORTNESS OF BREATH: 0
FREQUENCY: 0
SORE THROAT: 0
ARTHRALGIAS: 1
JOINT SWELLING: 1
MYALGIAS: 1
EYE PAIN: 0
FEVER: 0
HEMATOCHEZIA: 0
DIZZINESS: 0
CONSTIPATION: 0
DIARRHEA: 0
ABDOMINAL PAIN: 0
WEAKNESS: 0
PALPITATIONS: 0
NAUSEA: 0
NERVOUS/ANXIOUS: 0
CHILLS: 0
HEADACHES: 0
DYSURIA: 0
COUGH: 0
HEMATURIA: 0
PARESTHESIAS: 0

## 2023-06-06 ASSESSMENT — ACTIVITIES OF DAILY LIVING (ADL): CURRENT_FUNCTION: NO ASSISTANCE NEEDED

## 2023-06-06 NOTE — PROGRESS NOTES
"SUBJECTIVE:   Gary is a 73 year old who presents for Preventive Visit.    73-year-old gentleman comes for annual preventive physical examination and follow-up on hypertension, mixed dyslipidemia impaired fasting glucose.  He states he has been doing well.  He takes his medication as prescribed.  He has been checking his blood pressure at home and most of the time it is below 140/90.  Except last few days its been higher but he attributes to a common cold he is got.    Patient has gained 10 pounds since last clinic visit.  He admits to excessive snoring.  In summer months he is active.         View : No data to display.              Are you in the first 12 months of your Medicare coverage?  No    Healthy Habits:     In general, how would you rate your overall health?  Good    Frequency of exercise:  2-3 days/week    Duration of exercise:  30-45 minutes    Do you usually eat at least 4 servings of fruit and vegetables a day, include whole grains    & fiber and avoid regularly eating high fat or \"junk\" foods?  Yes    Taking medications regularly:  Yes    Medication side effects:  Muscle aches    Ability to successfully perform activities of daily living:  No assistance needed    Home Safety:  No safety concerns identified    Hearing Impairment:  Difficulty following a conversation in a noisy restaurant or crowded room and difficulty understanding soft or whispered speech    In the past 6 months, have you been bothered by leaking of urine?  No    In general, how would you rate your overall mental or emotional health?  Excellent      PHQ-2 Total Score: 0    Additional concerns today:  No        Have you ever done Advance Care Planning? (For example, a Health Directive, POLST, or a discussion with a medical provider or your loved ones about your wishes): No, advance care planning information given to patient to review.  Patient declined advance care planning discussion at this time.       Fall risk  Fallen 2 or more times " in the past year?: Yes  Any fall with injury in the past year?: No  click delete button to remove this line now  Cognitive Screening   1) Repeat 3 items (Leader, Season, Table)    2) Clock draw: NORMAL  3) 3 item recall: Recalls 3 objects  Results: 3 items recalled: COGNITIVE IMPAIRMENT LESS LIKELY    Mini-CogTM Copyright ABRAHAM Zhang. Licensed by the author for use in [facility]; reprinted with permission (mal@[email or web address]). All rights reserved.      Do you have sleep apnea, excessive snoring or daytime drowsiness?: yes    Reviewed and updated as needed this visit by clinical staff   Tobacco  Allergies  Meds              Reviewed and updated as needed this visit by Provider                 Social History     Tobacco Use     Smoking status: Never     Smokeless tobacco: Never   Vaping Use     Vaping status: Never Used   Substance Use Topics     Alcohol use: Yes     Comment: occasionally             5/30/2023    10:20 AM   Alcohol Use   Prescreen: >3 drinks/day or >7 drinks/week? No     Do you have a current opioid prescription? No  Do you use any other controlled substances or medications that are not prescribed by a provider? None              Current providers sharing in care for this patient include:   Patient Care Team:  Adam Soliz MD as PCP - General (Internal Medicine)  Adam Soliz MD as Assigned PCP  Carol Malone [facility] as Pharmacist (Pharmacist)  Migue Li MD as Assigned Surgical Provider    The following health maintenance items are reviewed in Epic and correct as of today:  Health Maintenance   Topic Date Due     ANNUAL REVIEW OF HM ORDERS  Never done     MEDICARE ANNUAL WELLNESS VISIT  12/14/2016     COLORECTAL CANCER SCREENING  03/15/2020     DTAP/TDAP/TD IMMUNIZATION (2 - Td or Tdap) 05/09/2021     COVID-19 Vaccine (3 - Pfizer series) 09/28/2021     HEMOGLOBIN  07/30/2022     ADVANCE CARE PLANNING  03/26/2023     BMP  04/26/2023     LIPID  04/26/2023     MICROALBUMIN   04/26/2023     INFLUENZA VACCINE (Season Ended) 09/01/2023     FALL RISK ASSESSMENT  06/06/2024     PHQ-2 (once per calendar year)  Completed     Pneumococcal Vaccine: 65+ Years  Completed     URINALYSIS  Completed     ZOSTER IMMUNIZATION  Completed     AORTIC ANEURYSM SCREENING (SYSTEM ASSIGNED)  Completed     HEPATITIS C SCREENING  Addressed     IPV IMMUNIZATION  Aged Out     MENINGITIS IMMUNIZATION  Aged Out     BP Readings from Last 3 Encounters:   06/06/23 (!) 147/70   09/29/22 127/83   08/09/22 126/78    Wt Readings from Last 3 Encounters:   06/06/23 119.4 kg (263 lb 4.8 oz)   09/29/22 114.8 kg (253 lb)   08/09/22 115.1 kg (253 lb 11.2 oz)                  Patient Active Problem List   Diagnosis     Advanced directives, counseling/discussion     Abnormal PSA     ED (erectile dysfunction)     Benign non-nodular prostatic hyperplasia with lower urinary tract symptoms     Hypertension, goal below 140/90     Class 2 severe obesity due to excess calories with serious comorbidity in adult (H)     Olecranon bursitis, left elbow     Acute gout     Impingement syndrome of both shoulders     Mixed dyslipidemia     IFG (impaired fasting glucose)     H/O adenomatous polyp of colon     Past Surgical History:   Procedure Laterality Date     ARTHROSCOPY KNEE RT/LT      Left knee     COLONOSCOPY  10/04/2011    Procedure:COLONOSCOPY; Colonoscopy, screening; Surgeon:HANNAH COMER; Location:[facility] OR     COLONOSCOPY  10/04/2011    Procedure:COMBINED COLONOSCOPY, REMOVE TUMOR/POLYP/LESION BY SNARE; Surgeon:HANNAH COMER; Location:[facility] OR     COLONOSCOPY N/A 12/09/2016    Procedure: COMBINED COLONOSCOPY, SINGLE OR MULTIPLE BIOPSY/POLYPECTOMY BY BIOPSY;  Surgeon: Duane, William Charles, MD;  Location: [facility] OR     COLONOSCOPY WITH CO2 INSUFFLATION N/A 12/09/2016    Procedure: COLONOSCOPY WITH CO2 INSUFFLATION;  Surgeon: Duane, William Charles, MD;  Location: [facility] OR     COLONOSCOPY WITH CO2 INSUFFLATION N/A 03/15/2017    Procedure:  COLONOSCOPY WITH CO2 INSUFFLATION;  Surgeon: Duane, William Charles, MD;  Location: [facility] OR     CYSTOSCOPY N/A 10/02/2019    Procedure: CYSTOSCOPY;  Surgeon: Oscar Resendiz MD;  Location:  OR     EYE SURGERY  2017     JOINT REPLACEMENT, HIP RT/LT      Joint Replacement Hip RT/LT-both replced in the last 5 years     LASER KTP GREEN LIGHT PHOTOSELECTIVE VAPORIZATION PROSTATE N/A 10/02/2019    Procedure: CYSTOSCOPY, VAPORIZATION, PROSTATE, PHOTOSELECTIVE, USING 532 NANOMETER 80 WATT KTP LASER;  Surgeon: Oscar Resendiz MD;  Location:  OR     TONSILLECTOMY         Social History     Tobacco Use     Smoking status: Never     Smokeless tobacco: Never   Vaping Use     Vaping status: Never Used   Substance Use Topics     Alcohol use: Yes     Comment: occasionally     Family History   Problem Relation Age of Onset     Alzheimer Disease Mother      Hypertension Mother      Heart Disease Father         CHF     Alzheimer Disease Paternal Grandmother      Alzheimer Disease Paternal Grandfather      Cancer Brother         esophageal cancer     Prostate Cancer Brother      Diabetes Brother      Prostate Cancer Brother      Diabetes Sister      Unknown/Adopted Son         adopted     Asthma No family hx of      C.A.D. No family hx of      Cerebrovascular Disease No family hx of      Breast Cancer No family hx of      Cancer - colorectal No family hx of      Alcohol/Drug No family hx of      Allergies No family hx of      Anesthesia Reaction No family hx of      Arthritis No family hx of      Blood Disease No family hx of      Cardiovascular No family hx of      Circulatory No family hx of      Congenital Anomalies No family hx of      Connective Tissue Disorder No family hx of      Depression No family hx of      Endocrine Disease No family hx of      Genetic Disorder No family hx of      Eye Disorder No family hx of      Gastrointestinal Disease No family hx of      Genitourinary Problems No family hx of      Gynecology No  "family hx of      Lipids No family hx of      Musculoskeletal Disorder No family hx of      Neurologic Disorder No family hx of      Obesity No family hx of      Osteoporosis No family hx of      Psychotic Disorder No family hx of      Respiratory No family hx of      Thyroid Disease No family hx of      Family History Negative No family hx of      Hearing Loss No family hx of      Substance Abuse No family hx of      Mental Illness No family hx of      Anxiety Disorder No family hx of      Hyperlipidemia No family hx of      Coronary Artery Disease No family hx of          Current Outpatient Medications   Medication Sig Dispense Refill     amLODIPine (NORVASC) 5 MG tablet Take 1 tablet (5 mg) by mouth daily 90 tablet 0     atorvastatin (LIPITOR) 20 MG tablet TAKE 1 TABLET DAILY 90 tablet 3     Krill Oil 1000 MG CAPS Take by mouth daily 750 mg to 1000 mg daily       Misc Natural Products (GLUCOSAMINE CHONDROITIN ADV PO) 2-3 tablets daily \"Move free pills\"       Multiple Vitamins-Minerals (MULTIVITAL PO) Once daily       Tdap, tetanus-diptheria-acell pertussis, (BOOSTRIX) 5-2.5-18.5 LF-MCG/0.5 LEONIE injection Inject 0.5 mLs into the muscle once for 1 dose 0.5 mL 0     valsartan (DIOVAN) 320 MG tablet TAKE 1 TABLET DAILY.       (REPLACES LOSARTAN) 90 tablet 0     Vitamin D3 (CHOLECALCIFEROL) 125 MCG (5000 UT) tablet Take 1 tablet by mouth daily       Allergies   Allergen Reactions     Lisinopril Cough     Persistent cough with Lisinopril             Review of Systems   Constitutional: Negative for chills and fever.   HENT: Negative for congestion, ear pain, hearing loss and sore throat.    Eyes: Negative for pain and visual disturbance.   Respiratory: Negative for cough and shortness of breath.    Cardiovascular: Negative for chest pain, palpitations and peripheral edema.   Gastrointestinal: Negative for abdominal pain, constipation, diarrhea, heartburn, hematochezia and nausea.   Genitourinary: Negative for dysuria, " "frequency, genital sores, hematuria, impotence, penile discharge and urgency.   Musculoskeletal: Positive for arthralgias, joint swelling and myalgias.   Skin: Negative for rash.   Neurological: Negative for dizziness, weakness, headaches and paresthesias.   Psychiatric/Behavioral: Negative for mood changes. The patient is not nervous/anxious.      Constitutional, HEENT, cardiovascular, pulmonary, GI, , musculoskeletal, neuro, skin, endocrine and psych systems are negative, except as otherwise noted.    OBJECTIVE:   There were no vitals taken for this visit. Estimated body mass index is 39.43 kg/m  as calculated from the following:    Height as of 7/30/21: 1.706 m (5' 7.17\").    Weight as of 9/29/22: 114.8 kg (253 lb).  Physical Exam  GENERAL: healthy, alert and no distress  EYES: Eyes grossly normal to inspection, PERRL and conjunctivae and sclerae normal  HENT: ear canals and TM's normal, nose and mouth without ulcers or lesions  NECK: no adenopathy, no asymmetry, masses, or scars and thyroid normal to palpation  RESP: lungs clear to auscultation - no rales, rhonchi or wheezes  CV: regular rate and rhythm, normal S1 S2, no S3 or S4, no murmur, click or rub, no peripheral edema and peripheral pulses strong  ABDOMEN: soft, nontender, no hepatosplenomegaly, no masses and bowel sounds normal   (male): normal male genitalia without lesions or urethral discharge, no hernia  MS: no gross musculoskeletal defects noted, no edema  SKIN: no suspicious lesions or rashes  NEURO: Normal strength and tone, mentation intact and speech normal  PSYCH: mentation appears normal, affect normal/bright  LYMPH: no cervical, supraclavicular, axillary, or inguinal adenopathy        ASSESSMENT / PLAN:     1.  Encounter for Medicare annual wellness exam.  Exam is good.  2.  Essential hypertension with blood pressure in the clinic elevated today.  Advised to continue to monitor at home since it seems good at home.  He will return for " recheck in 6 months and we will reassess.  If he notices a higher blood pressure at home then he will let me know.  3.  Mixed dyslipidemia been treated with atorvastatin 20 mg a day.  We will check fasting lipids.  4.  Impaired fasting glucose.  We will check A1c and get back to results.  5.  Morbid obesity.  BMI is 40.  He is up 10 pounds.  Discussed diet and activity.  6.  Premature heartbeat heard during exam.  EKG performed which showed normal sinus rhythm.  Reviewed it myself.  7.  Snoring with a large neck girth.  He is at high risk for sleep apnea.  Refer for sleep study.  It may be affecting his blood pressure.  8.  History of adenomatous colon polyp.  Patient overdue for colonoscopy.  Referral placed.  9.  Tdap vaccine provided today.      Patient will have urine microalbumin, lipids BMP A1c and hemoglobin check.  I will get back to her with the results.  Advised to follow-up in 6 months.    Patient has been advised of split billing requirements and indicates understanding: Yes      COUNSELING:  Reviewed preventive health counseling, as reflected in patient instructions       Regular exercise       Healthy diet/nutrition        He reports that he has never smoked. He has never used smokeless tobacco.      Appropriate preventive services were discussed with this patient, including applicable screening as appropriate for cardiovascular disease, diabetes, osteopenia/osteoporosis, and glaucoma.  As appropriate for age/gender, discussed screening for colorectal cancer, prostate cancer, breast cancer, and cervical cancer. Checklist reviewing preventive services available has been given to the patient.    Reviewed patients plan of care and provided an AVS. The Basic Care Plan (routine screening as documented in Health Maintenance) for Driss meets the Care Plan requirement. This Care Plan has been established and reviewed with the Patient.      Adam Soliz MD  [facility]    Identified  Health Risks:

## 2023-06-06 NOTE — NURSING NOTE
Prior to immunization administration, verified patients identity using patient s name and date of birth. Please see Immunization Activity for additional information.     Screening Questionnaire for Adult Immunization    Are you sick today?   No   Do you have allergies to medications, food, a vaccine component or latex?   No   Have you ever had a serious reaction after receiving a vaccination?   No   Do you have a long-term health problem with heart, lung, kidney, or metabolic disease (e.g., diabetes), asthma, a blood disorder, no spleen, complement component deficiency, a cochlear implant, or a spinal fluid leak?  Are you on long-term aspirin therapy?   No   Do you have cancer, leukemia, HIV/AIDS, or any other immune system problem?   No   Do you have a parent, brother, or sister with an immune system problem?   No   In the past 3 months, have you taken medications that affect  your immune system, such as prednisone, other steroids, or anticancer drugs; drugs for the treatment of rheumatoid arthritis, Crohn s disease, or psoriasis; or have you had radiation treatments?   No   Have you had a seizure, or a brain or other nervous system problem?   No   During the past year, have you received a transfusion of blood or blood    products, or been given immune (gamma) globulin or antiviral drug?   No   For women: Are you pregnant or is there a chance you could become       pregnant during the next month?   No   Have you received any vaccinations in the past 4 weeks?   No     Immunization questionnaire answers were all negative.      Injection of Tdap given by Nieves Rolon MA. Patient instructed to remain in clinic for 15 minutes afterwards, and to report any adverse reactions.     Screening performed by Nieves Rolon MA on 6/6/2023 at 5:36 PM.

## 2023-06-06 NOTE — PROGRESS NOTES
"    The patient was provided with written information regarding signs of hearing loss.  Answers for HPI/ROS submitted by the patient on 5/30/2023  In general, how would you rate your overall physical health?: good  Frequency of exercise:: 2-3 days/week  Do you usually eat at least 4 servings of fruit and vegetables a day, include whole grains & fiber, and avoid regularly eating high fat or \"junk\" foods? : Yes  Taking medications regularly:: Yes  Medication side effects:: Muscle aches  Activities of Daily Living: no assistance needed  Home safety: no safety concerns identified  Hearing Impairment:: difficulty following a conversation in a noisy restaurant or crowded room, difficulty understanding soft or whispered speech  In the past 6 months, have you been bothered by leaking of urine?: No  abdominal pain: No  Blood in stool: No  Blood in urine: No  chest pain: No  chills: No  congestion: No  constipation: No  cough: No  diarrhea: No  dizziness: No  ear pain: No  eye pain: No  nervous/anxious: No  fever: No  frequency: No  genital sores: No  headaches: No  hearing loss: No  heartburn: No  arthralgias: Yes  joint swelling: Yes  peripheral edema: No  mood changes: No  myalgias: Yes  nausea: No  dysuria: No  palpitations: No  Skin sensation changes: No  sore throat: No  urgency: No  rash: No  shortness of breath: No  visual disturbance: No  weakness: No  impotence: No  penile discharge: No  In general, how would you rate your overall mental or emotional health?: excellent  Additional concerns today:: No  Duration of exercise:: 30-45 minutes        "

## 2023-06-06 NOTE — PATIENT INSTRUCTIONS
Patient Education   Personalized Prevention Plan  You are due for the preventive services outlined below.  Your care team is available to assist you in scheduling these services.  If you have already completed any of these items, please share that information with your care team to update in your medical record.  Health Maintenance Due   Topic Date Due     Colorectal Cancer Screening  03/15/2020     Hemoglobin  07/30/2022     Basic Metabolic Panel  04/26/2023     Cholesterol Lab  04/26/2023     Kidney Microalbumin Urine Test  04/26/2023       Signs of Hearing Loss  Hearing loss is a problem shared by many people. In fact, it's one of the most common health problems, particularly as people age. Most people aged 65 and older have some hearing loss. By age 80, almost everyone does. Hearing loss often occurs slowly over the years. So, you may not realize your hearing has gotten worse.   When sudden hearing loss occurs, it's important to contact your healthcare provider right away. Your provider will do a medical exam and a hearing exam as soon as possible. This is to help find the cause and type of your sudden hearing loss. Based on your diagnosis, your healthcare provider will discuss possible treatments.      Hearing much better with one ear can be a sign of hearing loss.     Have your hearing checked  Call your healthcare provider if you:     Have to strain to hear normal conversation    Have to watch other people s faces very carefully to follow what they re saying    Need to ask people to repeat what they ve said    Often misunderstand what people are saying    Turn the volume of the television or radio up so high that others complain    Feel that people are mumbling when they re talking to you    Find that the effort to hear leaves you feeling tired and irritated    Notice, when using the phone, that you hear better with one ear than the other  [employer]ee last reviewed this educational content on 6/1/2022     7957-0579 [employer]. All rights reserved. This information is not intended as a substitute for professional medical care. Always follow your healthcare professional's instructions.

## 2023-06-08 ENCOUNTER — LAB (OUTPATIENT)
Dept: LAB | Facility: CLINIC | Age: 73
End: 2023-06-08
Payer: MEDICARE

## 2023-06-08 DIAGNOSIS — R73.01 IFG (IMPAIRED FASTING GLUCOSE): ICD-10-CM

## 2023-06-08 DIAGNOSIS — I10 HYPERTENSION, GOAL BELOW 140/90: ICD-10-CM

## 2023-06-08 DIAGNOSIS — E78.2 MIXED DYSLIPIDEMIA: ICD-10-CM

## 2023-06-08 LAB
ANION GAP SERPL CALCULATED.3IONS-SCNC: 12 MMOL/L (ref 7–15)
BUN SERPL-MCNC: 24 MG/DL (ref 8–23)
CALCIUM SERPL-MCNC: 9.6 MG/DL (ref 8.8–10.2)
CHLORIDE SERPL-SCNC: 103 MMOL/L (ref 98–107)
CHOLEST SERPL-MCNC: 162 MG/DL
CREAT SERPL-MCNC: 1.35 MG/DL (ref 0.67–1.17)
CREAT UR-MCNC: 83 MG/DL
DEPRECATED HCO3 PLAS-SCNC: 25 MMOL/L (ref 22–29)
GFR SERPL CREATININE-BSD FRML MDRD: 55 ML/MIN/1.73M2
GLUCOSE SERPL-MCNC: 104 MG/DL (ref 70–99)
HBA1C MFR BLD: 5.9 % (ref 0–5.6)
HDLC SERPL-MCNC: 59 MG/DL
HGB BLD-MCNC: 14.1 G/DL (ref 13.3–17.7)
LDLC SERPL CALC-MCNC: 78 MG/DL
MICROALBUMIN UR-MCNC: 33 MG/L
MICROALBUMIN/CREAT UR: 39.76 MG/G CR (ref 0–17)
NONHDLC SERPL-MCNC: 103 MG/DL
POTASSIUM SERPL-SCNC: 4.2 MMOL/L (ref 3.4–5.3)
SODIUM SERPL-SCNC: 140 MMOL/L (ref 136–145)
TRIGL SERPL-MCNC: 123 MG/DL

## 2023-06-08 PROCEDURE — 85018 HEMOGLOBIN: CPT

## 2023-06-08 PROCEDURE — 82570 ASSAY OF URINE CREATININE: CPT

## 2023-06-08 PROCEDURE — 83036 HEMOGLOBIN GLYCOSYLATED A1C: CPT

## 2023-06-08 PROCEDURE — 80048 BASIC METABOLIC PNL TOTAL CA: CPT

## 2023-06-08 PROCEDURE — 80061 LIPID PANEL: CPT

## 2023-06-08 PROCEDURE — 82043 UR ALBUMIN QUANTITATIVE: CPT

## 2023-06-08 PROCEDURE — 36415 COLL VENOUS BLD VENIPUNCTURE: CPT

## 2023-06-12 ENCOUNTER — TELEPHONE (OUTPATIENT)
Dept: GASTROENTEROLOGY | Facility: CLINIC | Age: 73
End: 2023-06-12
Payer: MEDICARE

## 2023-06-12 NOTE — TELEPHONE ENCOUNTER
"Endoscopy Scheduling Screen    Have you had a positive Covid test in the last 14 days?  No    Are you active on MyChart?   Yes    What insurance is in the chart?  Other:  MEDICARE/AARP     Ordering/Referring Provider: CAROLYNE NORMAN   (If ordering provider performs procedure, schedule with ordering provider unless otherwise instructed. )    BMI: Estimated body mass index is 40.03 kg/m  as calculated from the following:    Height as of 6/6/23: 1.727 m (5' 8\").    Weight as of 6/6/23: 119.4 kg (263 lb 4.8 oz).     Sedation Ordered  moderate sedation.   If patient BMI > 50 do not schedule in [facility].    Are you taking any prescription medications for pain?   No    Are you taking methadone or Suboxone?  No    Do you have a history of malignant hyperthermia or adverse reaction to anesthesia?  No    (Females) Are you currently pregnant?   No     Have you been diagnosed or told you have pulmonary hypertension?   No    Do you have an LVAD?  No    Have you been told you have moderate to severe sleep apnea?  No    Have you been told you have COPD, asthma, or any other lung disease?  No    Do you have any heart conditions?  No     Have you ever had or are you awaiting a heart or lung transplant?   No    Have you had a stroke or transient ischemic attack (TIA aka \"mini stroke\" in the last 6 months?   No    Have you been diagnosed with or been told you have cirrhosis of the liver?   No    Are you currently on dialysis?   No    Do you need assistance transferring?   No    BMI: Estimated body mass index is 40.03 kg/m  as calculated from the following:    Height as of 6/6/23: 1.727 m (5' 8\").    Weight as of 6/6/23: 119.4 kg (263 lb 4.8 oz).     Is patients BMI > 40 and scheduling location [facility]?  Yes (If MAC sedation is ordered, schedule PAC eval)    Do you take the medication Phentermine, Ozempic or Wegovy?  No    Do you take the medication Naltrexone?  No    Do you take blood thinners?  No    Preferred Pharmacy:    [facility] " [facility] #[id number] - [place], [place] - [address] [address]  [address] [address] [address]  Phone: [phone] Fax: [phone]          Prep   Are you currently on dialysis or do you have chronic kidney disease?  No (standard prep)    Do you have a diagnosis of diabetes?  No    Do you have a diagnosis of cystic fibrosis (CF)?  No    On a regular basis do you go 3 -5 days between bowel movements?  No    BMI > 40?  Yes (Extended Prep)    Final Scheduling Details   Colonoscopy prep sent?  Golytely Extended Prep    Procedure scheduled  COLONOSCOPY     Surgeon:  CYRUS      Date of procedure:  08/08/2023     Schedule PAC:   No    Location  [facility]    Sedation   Moderate Sedation    Patient Reminders:    You will receive a call from a Nurse to review instructions and health history.  This assessment must be completed prior to your procedure.  Failure to complete the Nurse assessment may result in the procedure being cancelled.       On the day of your procedure, please designate an adult(s) who can drive you home stay with you for the next 24 hours. The medicines used in the exam will make you sleepy. You will not be able to drive.       You cannot take public transportation, ride share services, or non-medical taxi service without a responsible caregiver.  Medical transport services are allowed with the requirement that a responsible caregiver will receive you at your destination.  We require that drivers and caregivers are confirmed prior to your procedure.

## 2023-07-24 ENCOUNTER — TELEPHONE (OUTPATIENT)
Dept: GASTROENTEROLOGY | Facility: CLINIC | Age: 73
End: 2023-07-24
Payer: MEDICARE

## 2023-07-24 DIAGNOSIS — Z12.11 SCREEN FOR COLON CANCER: Primary | ICD-10-CM

## 2023-07-24 RX ORDER — BISACODYL 5 MG/1
TABLET, DELAYED RELEASE ORAL
Qty: 4 TABLET | Refills: 0 | Status: SHIPPED | OUTPATIENT
Start: 2023-07-24 | End: 2023-11-10

## 2023-07-24 NOTE — TELEPHONE ENCOUNTER
Pre assessment completed for upcoming procedure.   (Please see previous telephone encounter notes for complete details)    Patient  returned call.       Procedure details:    Arrival time and facility location reviewed    Pre op exam needed? N/A    Designated  policy reviewed. Instructed to have someone stay 6 hours post procedure.     COVID policy reviewed.      Medication review:    Medications reviewed. Please see supporting documentation below. Holding recommendations discussed (if applicable).       Prep for procedure:     Reviewed procedure prep instructions.       Additional information needed?  N/A      Patient  verbalized understanding and had no questions or concerns at this time.      Nunu Abad RN  Endoscopy Procedure Pre Assessment RN  [phone] option 4

## 2023-07-24 NOTE — TELEPHONE ENCOUNTER
Attempted to contact patient in order to complete pre assessment questions.     Patient answered but it is not a good time.  Patient will call back.       Procedure details:    Patient scheduled for Colonoscopy  on 08.08.2023.     Arrival time: 0725. Procedure time 0810    Pre op exam needed? N/A    Facility location: [facility]; [address], [place] [address]    Sedation type: Conscious sedation     Indication for procedure:   Screen for colon cancer   H/O adenomatous polyp of colon       Chart review:     Electronic implanted devices? No    Diabetic? No    Diabetic medication HOLDING recommendations: (if applicable)  Oral diabetic medications: N/A  Diabetic injectables: N/A  Insulin: N/A      Medication review:    Anticoagulants? No    NSAIDS? No NSAID medications per patient's medication list.  RN will verify with pre-assessment call.    Other medication HOLDING recommendations:  N/A      Prep for procedure:     Bowel prep recommendation: Extended prep Golytely    Due to: BMI > 40.     Prep instructions sent via [facility]. Bowel prep script sent to    [id number] - [place], [place] - [address] [address]      Margarita Ewing RN  Endoscopy Procedure Pre Assessment RN

## 2023-08-08 ENCOUNTER — ANESTHESIA EVENT (OUTPATIENT)
Dept: SURGERY | Facility: AMBULATORY SURGERY CENTER | Age: 73
End: 2023-08-08
Payer: MEDICARE

## 2023-08-08 ENCOUNTER — HOSPITAL ENCOUNTER (OUTPATIENT)
Facility: AMBULATORY SURGERY CENTER | Age: 73
Discharge: HOME | End: 2023-08-08
Attending: INTERNAL MEDICINE
Payer: MEDICARE

## 2023-08-08 ENCOUNTER — ANESTHESIA (OUTPATIENT)
Dept: SURGERY | Facility: AMBULATORY SURGERY CENTER | Age: 73
End: 2023-08-08
Payer: MEDICARE

## 2023-08-08 VITALS
TEMPERATURE: 96.6 F | OXYGEN SATURATION: 94 % | DIASTOLIC BLOOD PRESSURE: 66 MMHG | RESPIRATION RATE: 18 BRPM | SYSTOLIC BLOOD PRESSURE: 121 MMHG

## 2023-08-08 VITALS — HEART RATE: 79 BPM

## 2023-08-08 LAB — COLONOSCOPY: NORMAL

## 2023-08-08 PROCEDURE — 88305 TISSUE EXAM BY PATHOLOGIST: CPT | Performed by: PATHOLOGY

## 2023-08-08 PROCEDURE — G8918 PT W/O PREOP ORDER IV AB PRO: HCPCS

## 2023-08-08 PROCEDURE — 45385 COLONOSCOPY W/LESION REMOVAL: CPT | Mod: PT

## 2023-08-08 PROCEDURE — G8907 PT DOC NO EVENTS ON DISCHARG: HCPCS

## 2023-08-08 PROCEDURE — 45380 COLONOSCOPY AND BIOPSY: CPT | Mod: PT,XS

## 2023-08-08 RX ORDER — PROCHLORPERAZINE MALEATE 5 MG
5 TABLET ORAL EVERY 6 HOURS PRN
Status: DISCONTINUED | OUTPATIENT
Start: 2023-08-08 | End: 2023-08-09 | Stop reason: HOSPADM

## 2023-08-08 RX ORDER — ONDANSETRON 2 MG/ML
4 INJECTION INTRAMUSCULAR; INTRAVENOUS EVERY 6 HOURS PRN
Status: DISCONTINUED | OUTPATIENT
Start: 2023-08-08 | End: 2023-08-09 | Stop reason: HOSPADM

## 2023-08-08 RX ORDER — LIDOCAINE HYDROCHLORIDE 20 MG/ML
INJECTION, SOLUTION INFILTRATION; PERINEURAL PRN
Status: DISCONTINUED | OUTPATIENT
Start: 2023-08-08 | End: 2023-08-08

## 2023-08-08 RX ORDER — SODIUM CHLORIDE, SODIUM LACTATE, POTASSIUM CHLORIDE, CALCIUM CHLORIDE 600; 310; 30; 20 MG/100ML; MG/100ML; MG/100ML; MG/100ML
INJECTION, SOLUTION INTRAVENOUS CONTINUOUS
Status: DISCONTINUED | OUTPATIENT
Start: 2023-08-08 | End: 2023-08-09 | Stop reason: HOSPADM

## 2023-08-08 RX ORDER — NALOXONE HYDROCHLORIDE 0.4 MG/ML
0.2 INJECTION, SOLUTION INTRAMUSCULAR; INTRAVENOUS; SUBCUTANEOUS
Status: DISCONTINUED | OUTPATIENT
Start: 2023-08-08 | End: 2023-08-09 | Stop reason: HOSPADM

## 2023-08-08 RX ORDER — ONDANSETRON 4 MG/1
4 TABLET, ORALLY DISINTEGRATING ORAL EVERY 6 HOURS PRN
Status: DISCONTINUED | OUTPATIENT
Start: 2023-08-08 | End: 2023-08-09 | Stop reason: HOSPADM

## 2023-08-08 RX ORDER — LIDOCAINE 40 MG/G
CREAM TOPICAL
Status: DISCONTINUED | OUTPATIENT
Start: 2023-08-08 | End: 2023-08-09 | Stop reason: HOSPADM

## 2023-08-08 RX ORDER — FLUMAZENIL 0.1 MG/ML
0.2 INJECTION, SOLUTION INTRAVENOUS
Status: ACTIVE | OUTPATIENT
Start: 2023-08-08 | End: 2023-08-08

## 2023-08-08 RX ORDER — NALOXONE HYDROCHLORIDE 0.4 MG/ML
0.4 INJECTION, SOLUTION INTRAMUSCULAR; INTRAVENOUS; SUBCUTANEOUS
Status: DISCONTINUED | OUTPATIENT
Start: 2023-08-08 | End: 2023-08-09 | Stop reason: HOSPADM

## 2023-08-08 RX ORDER — PROPOFOL 10 MG/ML
INJECTION, EMULSION INTRAVENOUS CONTINUOUS PRN
Status: DISCONTINUED | OUTPATIENT
Start: 2023-08-08 | End: 2023-08-08

## 2023-08-08 RX ORDER — ONDANSETRON 2 MG/ML
4 INJECTION INTRAMUSCULAR; INTRAVENOUS
Status: DISCONTINUED | OUTPATIENT
Start: 2023-08-08 | End: 2023-08-09 | Stop reason: HOSPADM

## 2023-08-08 RX ADMIN — PROPOFOL 100 MG: 10 INJECTION, EMULSION INTRAVENOUS at 08:36

## 2023-08-08 RX ADMIN — LIDOCAINE HYDROCHLORIDE 80 MG: 20 INJECTION, SOLUTION INFILTRATION; PERINEURAL at 08:35

## 2023-08-08 RX ADMIN — SODIUM CHLORIDE, SODIUM LACTATE, POTASSIUM CHLORIDE, CALCIUM CHLORIDE: 600; 310; 30; 20 INJECTION, SOLUTION INTRAVENOUS at 07:51

## 2023-08-08 RX ADMIN — PROPOFOL 150 MCG/KG/MIN: 10 INJECTION, EMULSION INTRAVENOUS at 08:35

## 2023-08-08 NOTE — ANESTHESIA CARE TRANSFER NOTE
Patient: Driss Pope    Procedure: Procedure(s):  Colonoscopy with CO2 insufflation  COLONOSCOPY, WITH POLYPECTOMY AND BIOPSY       Diagnosis: Screen for colon cancer [Z12.11]  H/O adenomatous polyp of colon [Z86.010]  Diagnosis Additional Information: No value filed.    Anesthesia Type:   MAC     Note:    Oropharynx: oropharynx clear of all foreign objects and spontaneously breathing  Level of Consciousness: drowsy  Oxygen Supplementation: room air    Independent Airway: airway patency satisfactory and stable  Dentition: dentition unchanged  Vital Signs Stable: post-procedure vital signs reviewed and stable  Report to RN Given: handoff report given  Patient transferred to: Phase II    Handoff Report: Identifed the Patient, Identified the Reponsible Provider, Reviewed the pertinent medical history, Discussed the surgical course, Reviewed Intra-OP anesthesia mangement and issues during anesthesia, Set expectations for post-procedure period and Allowed opportunity for questions and acknowledgement of understanding      Vitals:  Vitals Value Taken Time   BP     Temp     Pulse     Resp     SpO2         Electronically Signed By: JOSUE Nicholson CRNA  August 8, 2023  9:19 AM  
no

## 2023-08-08 NOTE — H&P
[facility] Anesthesia Pre-op History and Physical    Driss Pope MRN# [id number]   Age: 73 year old YOB: 1950     Date of Exam 8/8/2023         Primary care provider: Adam Soliz         Chief Complaint and/or Reason for Procedure:     History of advanced adenoma (large cecal SSA)       Active problem list:     Patient Active Problem List    Diagnosis Date Noted    IFG (impaired fasting glucose) 06/19/2020     Priority: Medium    Mixed dyslipidemia 09/30/2019     Priority: Medium    Acute gout 03/29/2019     Priority: Medium    Impingement syndrome of both shoulders 03/29/2019     Priority: Medium    Olecranon bursitis, left elbow 09/07/2018     Priority: Medium    Class 2 severe obesity due to excess calories with serious comorbidity in adult (H) 10/16/2017     Priority: Medium    H/O adenomatous polyp of colon 12/09/2016     Priority: Medium    Hypertension, goal below 140/90 10/11/2016     Priority: Medium    Benign non-nodular prostatic hyperplasia with lower urinary tract symptoms 12/14/2015     Priority: Medium    ED (erectile dysfunction) 06/24/2014     Priority: Medium    Advanced directives, counseling/discussion 09/25/2012     Priority: Medium     Patient states has Advance Directive and will bring in a copy to clinic. 9/25/2012         Abnormal PSA 09/25/2012     Priority: Medium            Medications (include herbals and vitamins):   Any Plavix use in the last 7 days? No     Current Outpatient Medications   Medication Sig    amLODIPine (NORVASC) 5 MG tablet Take 1 tablet (5 mg) by mouth daily    atorvastatin (LIPITOR) 20 MG tablet Take 1 tablet (20 mg) by mouth daily    Krill Oil 1000 MG CAPS Take by mouth daily 750 mg to 1000 mg daily    Multiple Vitamins-Minerals (MULTIVITAL PO) Once daily    valsartan (DIOVAN) 320 MG tablet TAKE 1 TABLET DAILY.       (REPLACES LOSARTAN)    Vitamin D3 (CHOLECALCIFEROL) 125 MCG (5000 UT) tablet Take 1 tablet by mouth daily    bisacodyl  "(DULCOLAX) 5 MG EC tablet 2 days prior to procedure, take 2 tablets at 4 pm. 1 day prior to procedure, take 2 tablets at 4 pm. For additional instructions refer to your colonoscopy prep instructions.    Misc Natural Products (GLUCOSAMINE CHONDROITIN ADV PO) 2-3 tablets daily \"Move free pills\"    polyethylene glycol (GOLYTELY) 236 g suspension 2 days prior at 5pm, mix and drink half of a jug of Golytely. Drink an 8 oz. glass of Golytely every 15 minutes until half of the jug is gone. Place remainder of Golytely in the refrigerator. 1 day prior at 5 pm, drink the 2nd half of a jug of Golytely bowel prep. 6 hours before your check-in time, drink an 8 oz. glass of Golytely every 15 minutes until half of the 2nd jug of Golytely is gone. Discard remainder of second jug.     Current Facility-Administered Medications   Medication    lactated ringers infusion    lidocaine (LMX4) kit    lidocaine 1 % 0.1-1 mL    ondansetron (ZOFRAN) injection 4 mg    sodium chloride (PF) 0.9% PF flush 3 mL    sodium chloride (PF) 0.9% PF flush 3 mL             Allergies:      Allergies   Allergen Reactions    Lisinopril Cough     Persistent cough with Lisinopril     Allergy to Latex? No  Allergy to tape?   No  Intolerances:             Physical Exam:   All vitals have been reviewed  Patient Vitals for the past 8 hrs:   BP Temp Temp src Resp SpO2   08/08/23 0742 127/74 97.4  F (36.3  C) Temporal 16 93 %     No intake/output data recorded.  Lungs:   No increased work of breathing, good air exchange, clear to auscultation bilaterally, no crackles or wheezing     Cardiovascular:   normal S1 and S2             Lab / Radiology Results:            Anesthetic risk and/or ASA classification:   2    Joann Carver,       "

## 2023-08-08 NOTE — ANESTHESIA POSTPROCEDURE EVALUATION
Patient: Driss Pope    Procedure: Procedure(s):  Colonoscopy with CO2 insufflation  COLONOSCOPY, WITH POLYPECTOMY AND BIOPSY       Anesthesia Type:  MAC    Note:  Disposition: Outpatient   Postop Pain Control: Uneventful            Sign Out: Well controlled pain   PONV: No   Neuro/Psych: Uneventful            Sign Out: Acceptable/Baseline neuro status   Airway/Respiratory: Uneventful            Sign Out: Acceptable/Baseline resp. status   CV/Hemodynamics: Uneventful            Sign Out: Acceptable CV status; No obvious hypovolemia; No obvious fluid overload   Other NRE: NONE   DID A NON-ROUTINE EVENT OCCUR? No       Last vitals:  Vitals Value Taken Time   /66 08/08/23 0953   Temp 96.6  F (35.9  C) 08/08/23 0922   Pulse     Resp 18 08/08/23 0953   SpO2 94 % 08/08/23 0953       Electronically Signed By: Viraj Bennett MD  August 8, 2023  3:29 PM

## 2023-08-08 NOTE — ANESTHESIA PREPROCEDURE EVALUATION
Anesthesia Pre-Procedure Evaluation    Patient: Driss Pope   MRN: [id number] : 1950        Procedure : Procedure(s):  Colonoscopy with CO2 insufflation          Past Medical History:   Diagnosis Date     CKD (chronic kidney disease)      CKD (chronic kidney disease) stage 3, GFR 30-59 ml/min (H) 3/25/2011     Complication of anesthesia      H/O adenomatous polyp of colon 2016     Hypertension goal BP (blood pressure) < 130/80      IFG (impaired fasting glucose) 2020     Impingement syndrome of both shoulders 2019     Mixed dyslipidemia 2019     Obesity      Osteoarthritis       Past Surgical History:   Procedure Laterality Date     ARTHROSCOPY KNEE RT/LT      Left knee     COLONOSCOPY  10/04/2011    Procedure:COLONOSCOPY; Colonoscopy, screening; Surgeon:HANNAH COMER; Location:[facility]     COLONOSCOPY  10/04/2011    Procedure:COMBINED COLONOSCOPY, REMOVE TUMOR/POLYP/LESION BY SNARE; Surgeon:HANNAH COMER; Location:[facility] OR     COLONOSCOPY N/A 2016    Procedure: COMBINED COLONOSCOPY, SINGLE OR MULTIPLE BIOPSY/POLYPECTOMY BY BIOPSY;  Surgeon: Duane, William Charles, MD;  Location: [facility] OR     COLONOSCOPY WITH CO2 INSUFFLATION N/A 2016    Procedure: COLONOSCOPY WITH CO2 INSUFFLATION;  Surgeon: Duane, William Charles, MD;  Location: [facility] OR     COLONOSCOPY WITH CO2 INSUFFLATION N/A 03/15/2017    Procedure: COLONOSCOPY WITH CO2 INSUFFLATION;  Surgeon: Duane, William Charles, MD;  Location: [facility] OR     CYSTOSCOPY N/A 10/02/2019    Procedure: CYSTOSCOPY;  Surgeon: Oscar Resendiz MD;  Location:  OR     EYE SURGERY  2017     JOINT REPLACEMENT, HIP RT/LT      Joint Replacement Hip RT/LT-both replced in the last 5 years     LASER KTP GREEN LIGHT PHOTOSELECTIVE VAPORIZATION PROSTATE N/A 10/02/2019    Procedure: CYSTOSCOPY, VAPORIZATION, PROSTATE, PHOTOSELECTIVE, USING [address] WATT KTP LASER;  Surgeon: Oscar Resendiz MD;  Location:  OR     TONSILLECTOMY        Allergies    Allergen Reactions     Lisinopril Cough     Persistent cough with Lisinopril      Social History     Tobacco Use     Smoking status: Never     Smokeless tobacco: Never   Substance Use Topics     Alcohol use: Yes     Comment: occasionally      Wt Readings from Last 1 Encounters:   06/06/23 119.4 kg (263 lb 4.8 oz)        Anesthesia Evaluation   Pt has had prior anesthetic. Type: General.    No history of anesthetic complications       ROS/MED HX  ENT/Pulmonary:  - neg pulmonary ROS     Neurologic:  - neg neurologic ROS     Cardiovascular:     (+) Dyslipidemia hypertension- -   -  - -                                      METS/Exercise Tolerance:     Hematologic:  - neg hematologic  ROS     Musculoskeletal: Comment: Gout  (+)  arthritis,             GI/Hepatic:  - neg GI/hepatic ROS     Renal/Genitourinary:     (+) renal disease, type: CRI, Pt does not require dialysis,           Endo:     (+)               Obesity,       Psychiatric/Substance Use:  - neg psychiatric ROS     Infectious Disease:       Malignancy:  - neg malignancy ROS     Other:          Physical Exam    Airway  airway exam normal           Respiratory Devices and Support         Dental       (+) Completely normal teeth      Cardiovascular   cardiovascular exam normal          Pulmonary   pulmonary exam normal            OUTSIDE LABS:  CBC:   Lab Results   Component Value Date    WBC 7.3 07/30/2021    WBC 7.9 05/30/2019    HGB 14.1 06/08/2023    HGB 14.2 07/30/2021    HCT 43.2 07/30/2021    HCT 40.4 05/30/2019     07/30/2021     05/30/2019     BMP:   Lab Results   Component Value Date     06/08/2023     04/26/2022    POTASSIUM 4.2 06/08/2023    POTASSIUM 4.5 06/02/2022    CHLORIDE 103 06/08/2023    CHLORIDE 108 04/26/2022    CO2 25 06/08/2023    CO2 27 04/26/2022    BUN 24.0 (H) 06/08/2023    BUN 21 04/26/2022    CR 1.35 (H) 06/08/2023    CR 1.23 06/02/2022     (H) 06/08/2023     (H) 04/26/2022     COAGS: No  results found for: PTT, INR, FIBR  POC: No results found for: BGM, HCG, HCGS  HEPATIC:   Lab Results   Component Value Date    ALBUMIN 4.0 07/30/2021    PROTTOTAL 7.0 07/30/2021    ALT 36 07/30/2021    AST 18 07/30/2021    ALKPHOS 71 07/30/2021    BILITOTAL 0.5 07/30/2021     OTHER:   Lab Results   Component Value Date    A1C 5.9 (H) 06/08/2023    RAH 9.6 06/08/2023    PHOS 2.9 10/08/2019    TSH 1.74 04/29/2011    SED 9 09/04/2018       Anesthesia Plan    ASA Status:  3    NPO Status:  NPO Appropriate    Anesthesia Type: MAC.     - Reason for MAC: chronic cardiopulmonary disease   Induction: Intravenous, Propofol.   Maintenance: TIVA.        Consents    Anesthesia Plan(s) and associated risks, benefits, and realistic alternatives discussed. Questions answered and patient/representative(s) expressed understanding.     - Discussed:     - Discussed with:  Patient      - Extended Intubation/Ventilatory Support Discussed: No.      - Patient is DNR/DNI Status: No     Use of blood products discussed: No .     Postoperative Care    Post procedure pain management: None required.   PONV prophylaxis: Ondansetron (or other 5HT-3), Background Propofol Infusion     Comments:           H&P reviewed: Unable to attach VIRTUAL H&P to encounter due to EHR limitations. Appropriate H&P reviewed. The physical exam performed by anesthesia during this surgical encounter serves as the physical portion of that virtual H&P.  Any significant changes noted within this preop evaluation.        Viraj Bennett MD

## 2023-08-09 LAB
PATH REPORT.COMMENTS IMP SPEC: NORMAL
PATH REPORT.COMMENTS IMP SPEC: NORMAL
PATH REPORT.FINAL DX SPEC: NORMAL
PATH REPORT.GROSS SPEC: NORMAL
PATH REPORT.MICROSCOPIC SPEC OTHER STN: NORMAL
PATH REPORT.RELEVANT HX SPEC: NORMAL
PHOTO IMAGE: NORMAL

## 2023-08-16 DIAGNOSIS — I10 HYPERTENSION, GOAL BELOW 140/90: ICD-10-CM

## 2023-08-17 RX ORDER — AMLODIPINE BESYLATE 5 MG/1
5 TABLET ORAL DAILY
Qty: 90 TABLET | Refills: 1 | Status: SHIPPED | OUTPATIENT
Start: 2023-08-17 | End: 2024-01-02

## 2023-10-19 ENCOUNTER — PATIENT OUTREACH (OUTPATIENT)
Dept: GASTROENTEROLOGY | Facility: CLINIC | Age: 73
End: 2023-10-19
Payer: MEDICARE

## 2023-11-09 PROBLEM — M70.22 OLECRANON BURSITIS, LEFT ELBOW: Status: RESOLVED | Noted: 2018-09-07 | Resolved: 2023-11-09

## 2023-11-09 PROBLEM — E66.01 CLASS 2 SEVERE OBESITY DUE TO EXCESS CALORIES WITH SERIOUS COMORBIDITY IN ADULT (H): Chronic | Status: ACTIVE | Noted: 2017-10-16

## 2023-11-09 PROBLEM — E66.812 CLASS 2 SEVERE OBESITY DUE TO EXCESS CALORIES WITH SERIOUS COMORBIDITY IN ADULT (H): Chronic | Status: ACTIVE | Noted: 2017-10-16

## 2023-11-09 ASSESSMENT — SLEEP AND FATIGUE QUESTIONNAIRES
HOW LIKELY ARE YOU TO NOD OFF OR FALL ASLEEP WHILE SITTING AND READING: SLIGHT CHANCE OF DOZING
HOW LIKELY ARE YOU TO NOD OFF OR FALL ASLEEP WHEN YOU ARE A PASSENGER IN A CAR FOR AN HOUR WITHOUT A BREAK: WOULD NEVER DOZE
HOW LIKELY ARE YOU TO NOD OFF OR FALL ASLEEP WHILE SITTING AND TALKING TO SOMEONE: WOULD NEVER DOZE
HOW LIKELY ARE YOU TO NOD OFF OR FALL ASLEEP WHILE WATCHING TV: SLIGHT CHANCE OF DOZING
HOW LIKELY ARE YOU TO NOD OFF OR FALL ASLEEP WHILE SITTING QUIETLY AFTER LUNCH WITHOUT ALCOHOL: SLIGHT CHANCE OF DOZING
HOW LIKELY ARE YOU TO NOD OFF OR FALL ASLEEP WHILE LYING DOWN TO REST IN THE AFTERNOON WHEN CIRCUMSTANCES PERMIT: WOULD NEVER DOZE
HOW LIKELY ARE YOU TO NOD OFF OR FALL ASLEEP IN A CAR, WHILE STOPPED FOR A FEW MINUTES IN TRAFFIC: WOULD NEVER DOZE
HOW LIKELY ARE YOU TO NOD OFF OR FALL ASLEEP WHILE SITTING INACTIVE IN A PUBLIC PLACE: SLIGHT CHANCE OF DOZING

## 2023-11-10 ENCOUNTER — OFFICE VISIT (OUTPATIENT)
Dept: SLEEP MEDICINE | Facility: CLINIC | Age: 73
End: 2023-11-10
Payer: MEDICARE

## 2023-11-10 VITALS
WEIGHT: 254 LBS | DIASTOLIC BLOOD PRESSURE: 78 MMHG | SYSTOLIC BLOOD PRESSURE: 145 MMHG | HEART RATE: 85 BPM | OXYGEN SATURATION: 94 % | HEIGHT: 68 IN | BODY MASS INDEX: 38.49 KG/M2

## 2023-11-10 DIAGNOSIS — E66.01 CLASS 2 SEVERE OBESITY DUE TO EXCESS CALORIES WITH SERIOUS COMORBIDITY AND BODY MASS INDEX (BMI) OF 38.0 TO 38.9 IN ADULT (H): ICD-10-CM

## 2023-11-10 DIAGNOSIS — I10 ESSENTIAL HYPERTENSION: ICD-10-CM

## 2023-11-10 DIAGNOSIS — Z72.820 LACK OF ADEQUATE SLEEP: ICD-10-CM

## 2023-11-10 DIAGNOSIS — E66.812 CLASS 2 SEVERE OBESITY DUE TO EXCESS CALORIES WITH SERIOUS COMORBIDITY AND BODY MASS INDEX (BMI) OF 38.0 TO 38.9 IN ADULT (H): ICD-10-CM

## 2023-11-10 DIAGNOSIS — R06.89 DYSPNEA AND RESPIRATORY ABNORMALITY: Primary | ICD-10-CM

## 2023-11-10 DIAGNOSIS — R53.81 MALAISE AND FATIGUE: ICD-10-CM

## 2023-11-10 DIAGNOSIS — R53.83 MALAISE AND FATIGUE: ICD-10-CM

## 2023-11-10 DIAGNOSIS — R06.83 SNORING: ICD-10-CM

## 2023-11-10 DIAGNOSIS — E66.01 MORBID OBESITY (H): ICD-10-CM

## 2023-11-10 DIAGNOSIS — R06.00 DYSPNEA AND RESPIRATORY ABNORMALITY: Primary | ICD-10-CM

## 2023-11-10 PROBLEM — E78.2 MIXED DYSLIPIDEMIA: Chronic | Status: ACTIVE | Noted: 2019-09-30

## 2023-11-10 PROBLEM — M75.42 IMPINGEMENT SYNDROME OF BOTH SHOULDERS: Status: RESOLVED | Noted: 2019-03-29 | Resolved: 2023-11-10

## 2023-11-10 PROBLEM — M75.41 IMPINGEMENT SYNDROME OF BOTH SHOULDERS: Status: RESOLVED | Noted: 2019-03-29 | Resolved: 2023-11-10

## 2023-11-10 PROCEDURE — 99204 OFFICE O/P NEW MOD 45 MIN: CPT | Performed by: PHYSICIAN ASSISTANT

## 2023-11-10 NOTE — NURSING NOTE
"Chief Complaint   Patient presents with    Snoring    Sleep Problem     Was recommended by PCP.        Initial BP (!) 144/80   Pulse 85   Ht 1.727 m (5' 8\")   Wt 115.2 kg (254 lb)   SpO2 94%   BMI 38.62 kg/m   Estimated body mass index is 38.62 kg/m  as calculated from the following:    Height as of this encounter: 1.727 m (5' 8\").    Weight as of this encounter: 115.2 kg (254 lb).    Medication Reconciliation: complete    Neck circumference: 18.5 inches / 47 centimeters.  Alesia Buenrostro CMA on 11/10/2023 at 1:06 PM   "

## 2023-11-10 NOTE — PATIENT INSTRUCTIONS
"          MY TREATMENT INFORMATION FOR SLEEP APNEA-  Driss Pope    DOCTOR : SESAR Huizar    Am I having a sleep study at a sleep center?  --->Due to normal delays, you will be contacted within 2-4 weeks to schedule    Am I having a home sleep study?  --->Watch the video for the device you are using:    -/drop off device-   https://www.Feedjitube.com/watch?v=yGGFBdELGhk    -Disposable device sent out require phone/computer application-   https://www.Evermind.com/watch?v=BCce_vbiwxE      Frequently asked questions:  1. What is Obstructive Sleep Apnea (CJ)? CJ is the most common type of sleep apnea. Apnea means, \"without breath.\"  Apnea is most often caused by narrowing or collapse of the upper airway as muscles relax during sleep.   Almost everyone has occasional apneas. Most people with sleep apnea have had brief interruptions at night frequently for many years.  The severity of sleep apnea is related to how frequent and severe the events are.   2. What are the consequences of CJ? Symptoms include: feeling sleepy during the day, snoring loudly, gasping or stopping of breathing, trouble sleeping, and occasionally morning headaches or heartburn at night.  Sleepiness can be serious and even increase the risk of falling asleep while driving. Other health consequences may include development of high blood pressure and other cardiovascular disease in persons who are susceptible. Untreated CJ  can contribute to heart disease, stroke and diabetes.   3. What are the treatment options? In most situations, sleep apnea is a lifelong disease that must be managed with daily therapy. Medications are not effective for sleep apnea and surgery is generally not considered until other therapies have been tried. Your treatment is your choice . Continuous Positive Airway (CPAP) works right away and is the therapy that is effective in nearly everyone. An oral device to hold your jaw forward is usually the next most " reliable option. Other options include postioning devices (to keep you off your back), weight loss, and surgery including a tongue pacing device. There is more detail about some of these options below.  4. Are my sleep studies covered by insurance? Although we will request verification of coverage, we advise you also check in advance of the study to ensure there is coverage.    Important tips for those choosing CPAP and similar devices  For new devices, sign up for device JESSY to monitor your device for your followup visits  We encourage you to utilize the [employer] jessy or website (myAir web (resmed.com) ) to monitor your therapy progress and share the data with your healthcare team when you discuss your sleep apnea.                                                    Know your equipment:  CPAP is continuous positive airway pressure that prevents obstructive sleep apnea by keeping the throat from collapsing while you are sleeping. In most cases, the device is  smart  and can slowly self-adjusts if your throat collapses and keeps a record every day of how well you are treated-this information is available to you and your care team.  BPAP is bilevel positive airway pressure that keeps your throat open and also assists each breath with a pressure boost to maintain adequate breathing.  Special kinds of BPAP are used in patients who have inadequate breathing from lung or heart disease. In most cases, the device is  smart  and can slowly self-adjusts to assist breathing. Like CPAP, the device keeps a record of how well you are treated.  Your mask is your connection to the device. You get to choose what feels most comfortable and the staff will help to make sure if fits. Here: are some examples of the different masks that are available:       Key points to remember on your journey with sleep apnea:  Sleep study.  PAP devices often need to be adjusted during a sleep study to show that they are effective and adjusted  right.  Good tips to remember: Try wearing just the mask during a quiet time during the day so your body adapts to wearing it. A humidifier is recommended for comfort in most cases to prevent drying of your nose and throat. Allergy medication from your provider may help you if you are having nasal congestion.  Getting settled-in. It takes more than one night for most of us to get used to wearing a mask. Try wearing just the mask during a quiet time during the day so your body adapts to wearing it. A humidifier is recommended for comfort in most cases. Our team will work with you carefully on the first day and will be in contact within 4 days and again at 2 and 4 weeks for advice and remote device adjustments. Your therapy is evaluated by the device each day.   Use it every night. The more you are able to sleep naturally for 7-8 hours, the more likely you will have good sleep and to prevent health risks or symptoms from sleep apnea. Even if you use it 4 hours it helps. Occasionally all of us are unable to use a medical therapy, in sleep apnea, it is not dangerous to miss one night.   Communicate. Call our skilled team on the number provided on the first day if your visit for problems that make it difficult to wear the device. Over 2 out of 3 patients can learn to wear the device long-term with help from our team. Remember to call our team or your sleep providers if you are unable to wear the device as we may have other solutions for those who cannot adapt to mask CPAP therapy. It is recommended that you sleep your sleep provider within the first 3 months and yearly after that if you are not having problems.   Use it for your health. We encourage use of CPAP masks during daytime quiet periods to allow your face and brain to adapt to the sensation of CPAP so that it will be a more natural sensation to awaken to at night or during naps. This can be very useful during the first few weeks or months of adapting to CPAP  though it does not help medically to wear CPAP during wakefulness and  should not be used as a strategy just to meet guidelines.  Take care of your equipment. Make sure you clean your mask and tubing using directions every day and that your filter and mask are replaced as recommended or if they are not working.     BESIDES CPAP, WHAT OTHER THERAPIES ARE THERE?    Positioning Device  Positioning devices are generally used when sleep apnea is mild and only occurs on your back.This example shows a pillow that straps around the waist. It may be appropriate for those whose sleep study shows milder sleep apnea that occurs primarily when lying flat on one's back. Preliminary studies have shown benefit but effectiveness at home may need to be verified by a home sleep test. These devices are generally not covered by medical insurance.  Examples of devices that maintain sleeping on the back to prevent snoring and mild sleep apnea.    Belt type body positioner  http://FunnelFire/    Electronic reminder  http://nightshifttherapy.IDRI ([facility])/            Oral Appliance  What is oral appliance therapy?  An oral appliance device fits on your teeth at night like a retainer used after having braces. The device is made by a specialized dentist and requires several visits over 1-2 months before a manufactured device is made to fit your teeth and is adjusted to prevent your sleep apnea. Once an oral device is working properly, snoring should be improved. A home sleep test may be recommended at that time if to determine whether the sleep apnea is adequately treated.       Some things to remember:  -Oral devices are often, but not always, covered by your medical insurance. Be sure to check with your insurance provider.   -If you are referred for oral therapy, you will be given a list of specialized dentists to consider or you may choose to visit the Web site of the American Academy of Dental Sleep Medicine  -Oral devices are less likely to work  if you have severe sleep apnea or are extremely overweight.     More detailed information  An oral appliance is a small acrylic device that fits over the upper and lower teeth  (similar to a retainer or a mouth guard). This device slightly moves jaw forward, which moves the base of the tongue forward, opens the airway, improves breathing for effective treat snoring and obstructive sleep apnea in perhaps 7 out of 10 people .  The best working devices are custom-made by a dental device  after a mold is made of the teeth 1, 2, 3.  When is an oral appliance indicated?  Oral appliance therapy is recommended as a first-line treatment for patients with primary snoring, mild sleep apnea, and for patients with moderate sleep apnea who prefer appliance therapy to use of CPAP4, 5. Severity of sleep apnea is determined by sleep testing and is based on the number of respiratory events per hour of sleep.   How successful is oral appliance therapy?  The success rate of oral appliance therapy in patients with mild sleep apnea is 75-80% while in patients with moderate sleep apnea it is 50-70%. The chance of success in patients with severe sleep apnea is 40-50%. The research also shows that oral appliances have a beneficial effect on the cardiovascular health of CJ patients at the same magnitude as CPAP therapy7.  Oral appliances should be a second-line treatment in cases of severe sleep apnea, but if not completely successful then a combination therapy utilizing CPAP plus oral appliance therapy may be effective. Oral appliances tend to be effective in a broad range of patients although studies show that the patients who have the highest success are females, younger patients, those with milder disease, and less severe obesity. 3, 6.   Finding a dentist that practices dental sleep medicine  Specific training is available through the American Academy of Dental Sleep Medicine for dentists interested in working in the field  of sleep. To find a dentist who is educated in the field of sleep and the use of oral appliances, near you, visit the Web site of the American Academy of Dental Sleep Medicine.    References  1. Keshawn et al. Objectively measured vs self-reported compliance during oral appliance therapy for sleep-disordered breathing. Chest 2013; 144(5): 1672-6158.  2. Radhika, et al. Objective measurement of compliance during oral appliance therapy for sleep-disordered breathing. Thorax 2013; 68(1): 91-96.  3. Manju et al. Mandibular advancement devices in 620 men and women with CJ and snoring: tolerability and predictors of treatment success. Chest 2004; 125: 6851-3026.  4. Henny et al. Oral appliances for snoring and CJ: a review. Sleep 2006; 29: 244-262.  5. Martin et al. Oral appliance treatment for CJ: an update. J Clin Sleep Med 2014; 10(2): 215-227.  6. Lorenzo et al. Predictors of OSAH treatment outcome. J Dent Res 2007; 86: 2465-7447.      Weight Loss:    Weight loss is a long-term strategy that may improve sleep apnea in some patients.    Weight management is a personal decision and the decision should be based on your interest and the potential benefits.  If you are interested in exploring weight loss strategies, the following discussion covers the impact on weight loss on sleep apnea and the approaches that may be successful.    Being overweight does not necessarily mean you will have health consequences.  Those who have BMI over 35 or over 27 with existing medical conditions carries greater risk.   Weight loss decreases severity of sleep apnea in most people with obesity. For those with mild obesity who have developed snoring with weight gain, even 15-30 pound weight loss can improve and occasionally eliminate sleep apnea.  Structured and life-long dietary and health habits are necessary to lose weight and keep healthier weight levels.     Though there may be significant health benefits from  weight loss, long-term weight loss is very difficult to achieve- studies show success with dietary management in less than 10% of people. In addition, substantial weight loss may require years of dietary control and may be difficult if patients have severe obesity. In these cases, surgical management may be considered.  Finally, older individuals who have tolerated obesity without health complications may be less likely to benefit from weight loss strategies.      [unfilled]    Surgery:    Surgery for obstructive sleep apnea is considered generally only when other therapies fail to work. Surgery may be discussed with you if you are having a difficult time tolerating CPAP and or when there is an abnormal structure that requires surgical correction.  Nose and throat surgeries often enlarge the airway to prevent collapse.  Most of these surgeries create pain for 1-2 weeks and up to half of the most common surgeries are not effective throughout life.  You should carefully discuss the benefits and drawbacks to surgery with your sleep provider and surgeon to determine if it is the best solution for you.   More information  Surgery for CJ is directed at areas that are responsible for narrowing or complete obstruction of the airway during sleep.  There are a wide range of procedures available to enlarge and/or stabilize the airway to prevent blockage of breathing in the three major areas where it can occur: the palate, tongue, and nasal regions.  Successful surgical treatment depends on the accurate identification of the factors responsible for obstructive sleep apnea in each person.  A personalized approach is required because there is no single treatment that works well for everyone.  Because of anatomic variation, consultation with an examination by a sleep surgeon is a critical first step in determining what surgical options are best for each patient.  In some cases, examination during sedation may be recommended in  order to guide the selection of procedures.  Patients will be counseled about risks and benefits as well as the typical recovery course after surgery. Surgery is typically not a cure for a person s CJ.  However, surgery will often significantly improve one s CJ severity (termed  success rate ).  Even in the absence of a cure, surgery will decrease the cardiovascular risk associated with OSA7; improve overall quality of life8 (sleepiness, functionality, sleep quality, etc).      Palate Procedures:  Patients with CJ often have narrowing of their airway in the region of their tonsils and uvula.  The goals of palate procedures are to widen the airway in this region as well as to help the tissues resist collapse.  Modern palate procedure techniques focus on tissue conservation and soft tissue rearrangement, rather than tissue removal.  Often the uvula is preserved in this procedure. Residual sleep apnea is common in patient after pharyngoplasty with an average reduction in sleep apnea events of 33%2.      Tongue Procedures:  ExamWhile patients are awake, the muscles that surround the throat are active and keep this region open for breathing. These muscles relax during sleep, allowing the tongue and other structures to collapse and block breathing.  There are several different tongue procedures available.  Selection of a tongue base procedure depends on characteristics seen on physical exam.  Generally, procedures are aimed at removing bulky tissues in this area or preventing the back of the tongue from falling back during sleep.  Success rates for tongue surgery range from 50-62%3.    Hypoglossal Nerve Stimulation:  Hypoglossal nerve stimulation has recently received approval from the [place] Food and Drug Administration for the treatment of obstructive sleep apnea.  This is based on research showing that the system was safe and effective in treating sleep apnea6.  Results showed that the median AHI score  decreased 68%, from 29.3 to 9.0. This therapy uses an implant system that senses breathing patterns and delivers mild stimulation to airway muscles, which keeps the airway open during sleep.  The system consists of three fully implanted components: a small generator (similar in size to a pacemaker), a breathing sensor, and a stimulation lead.  Using a small handheld remote, a patient turns the therapy on before bed and off upon awakening.    Candidates for this device must be greater than 18 years of age, have moderate to severe CJ (AHI between 15-65), BMI less than 35, have tried CPAP/oral appliance for at least 8 weeks without success, and have appropriate upper airway anatomy (determined by a sleep endoscopy performed by Dr. Edmar Ramirez).    Hypoglossal Nerve Stimulation Pathway:    The sleep surgeon s office will work with the patient through the insurance prior-authorization process (including communications and appeals).    Nasal Procedures:  Nasal obstruction can interfere with nasal breathing during the day and night.  Studies have shown that relief of nasal obstruction can improve the ability of some patients to tolerate positive airway pressure therapy for obstructive sleep apnea1.  Treatment options include medications such as nasal saline, topical corticosteroid and antihistamine sprays, and oral medications such as antihistamines or decongestants. Non-surgical treatments can include external nasal dilators for selected patients. If these are not successful by themselves, surgery can improve the nasal airway either alone or in combination with these other options.      Combination Procedures:  Combination of surgical procedures and other treatments may be recommended, particularly if patients have more than one area of narrowing or persistent positional disease.  The success rate of combination surgery ranges from 66-80%2,3.    References  Michelle ALFARO. The Role of the Nose in Snoring and Obstructive  Sleep Apnoea: An Update.  Eur Arch Otorhinolaryngol. 2011; 268: 1365-73.   Dale SM; Sarah JA; Mac JR; Pallanch JF; Emile MB; Ana Cristina SG; Asif ESTRADA. Surgical modifications of the upper airway for obstructive sleep apnea in adults: a systematic review and meta-analysis. SLEEP 2010;33(10):5229-4967. Xochilt CHERY. Hypopharyngeal surgery in obstructive sleep apnea: an evidence-based medicine review.  Arch Otolaryngol Head Neck Surg. 2006 Feb;132(2):206-13.  Jimmy YH1, Irina Y, Too LETTY. The efficacy of anatomically based multilevel surgery for obstructive sleep apnea. Otolaryngol Head Neck Surg. 2003 Oct;129(4):327-35.  Kezirian E, Goldberg A. Hypopharyngeal Surgery in Obstructive Sleep Apnea: An Evidence-Based Medicine Review. Arch Otolaryngol Head Neck Surg. 2006 Feb;132(2):206-13.  Danny DARDEN et al. Upper-Airway Stimulation for Obstructive Sleep Apnea.  N Engl J Med. 2014 Jan 9;370(2):139-49.  Peker Y et al. Increased Incidence of Cardiovascular Disease in Middle-aged Men with Obstructive Sleep Apnea. Am J Respir Crit Care Med; 2002 166: 159-165  Armstrong EM et al. Studying Life Effects and Effectiveness of Palatopharyngoplasty (SLEEP) study: Subjective Outcomes of Isolated Uvulopalatopharyngoplasty. Otolaryngol Head Neck Surg. 2011; 144: 623-631.        WHAT IF I ONLY HAVE SNORING?    Mandibular advancement devices, lateral sleep positioning, long-term weight loss and treatment of nasal allergies have been shown to improve snoring.  Exercising tongue muscles with a game (https://apps.[employer].KabeExploration/us/jessy/soundly-reduce-snoring/rs5360528744) or stimulating the tongue during the day with a device (https://doi.org/10.3390/pld88609655) have improved snoring in some individuals.    Remember to Drive Safe... Drive Alive     Sleep health profoundly affects your health, mood, and your safety.  Thirty three percent of the population (one in three of us) is not getting enough sleep and many have a sleep disorder. Not getting  enough sleep or having an untreated / undertreated sleep condition may make us sleepy without even knowing it. In fact, our driving could be dramatically impaired due to our sleep health. As your provider, here are some things I would like you to know about driving:     Here are some warning signs for impairment and dangerous drowsy driving:              -Having been awake more than 16 hours               -Looking tired               -Eyelid drooping              -Head nodding (it could be too late at this point)              -Driving for more than 30 minutes     Some things you could do to make the driving safer if you are experiencing some drowsiness:              -Stop driving and rest              -Call for transportation              -Make sure your sleep disorder is adequately treated     Some things that have been shown NOT to work when experiencing drowsiness while driving:              -Turning on the radio              -Opening windows              -Eating any  distracting  /  entertaining  foods (e.g., sunflower seeds, candy, or any other)              -Talking on the phone      Your decision may not only impact your life, but also the life of others. Please, remember to drive safe for yourself and all of us.

## 2023-11-10 NOTE — PROGRESS NOTES
Outpatient Sleep Medicine Consultation:      Name: Driss Pope MRN# [id number]   Age: 73 year old YOB: 1950     Date of Consultation: November 10, 2023  Consultation is requested by: Adam Soliz MD  [address] [facility] [address]  [facility] [place]  [place] [address] Adam Soliz  Primary care provider: Adam Soliz       Reason for Sleep Consult:     Driss Pope is sent by Adam Soliz for a sleep consultation regarding sleep apnea.    Patient s Reason for visit  Driss Pope main reason for visit: Doctor recommended because of the size of my girth.  Patient states problem(s) started: I was unaware of it.  Driss Pope's goals for this visit: Get an answer to his concern.           Assessment and Plan:     Summary Sleep Diagnoses:  Patient has features and risk factors for possible obstructive sleep apnea including: BMI of 38, loud snoring, difficulty maintaining sleep, crowded oropharynx, family history of CJ and co-morbid HTN. The STOP-BANG score is 6/8. The pathophysiology, diagnosis and treatment of CJ was discussed and a handout was provided.   Plan:    1. Polysomnogram (using 4% desaturation/Medicare/ AASM 1B scoring rules) for high probability obstructive sleep apnea.    2. Recommend weight management. We discussed the link between obesity, sleep apnea, and health outcomes. Weight loss is recommended to address long term effects of obesity and sleep apnea.      Summary Recommendations:  Orders Placed This Encounter   Procedures    Comprehensive Sleep Study     Summary Counseling:    Sleep Testing Reviewed  Obstructive Sleep Apnea Reviewed  Complications of Untreated Sleep Apnea Reviewed    Medical Decision-making:   Educational materials provided in instructions    Total time spent reviewing medical records, history and physical examination, review of previous testing and interpretation as well as documentation on this date:50 minutes    CC: Adam Soliz          History of Present Illness:      Past Sleep Evaluations: NA    SLEEP-WAKE SCHEDULE:     Work/School Days: Patient goes to school/work: No   Usually gets into bed at Between 10pm & Midnight  Takes patient about From 5-15 minutes. to fall asleep  Has trouble falling asleep 0 nights per week  Wakes up in the middle of the night 1-3 times.  Wakes up due to Pain;Use the bathroom;Other  He has trouble falling back asleep 1-2 times a week.   It usually takes 5-30 minutes to get back to sleep  Patient is usually up at 6:30-7:30 am  Uses alarm: No    Weekends/Non-work Days/All Other Days:  Usually gets into bed at Midnight   Takes patient about 10-30 minutes to fall asleep  Patient is usually up at 7-8 am  Uses alarm: No    Sleep Need  Patient gets  7-8 hours sleep on average   Patient thinks he needs about 7 hours sleep    Driss Pope prefers to sleep in this position(s): Side   Patient states they do the following activities in bed: Watch TV;Use phone, computer, or tablet    Naps  Patient takes a purposeful nap >1 times a week and naps are usually 15 minutes in duration  He feels better after a nap: Yes  He dozes off unintentionally 1 days per week  Patient has had a driving accident or near-miss due to sleepiness/drowsiness: No      SLEEP DISRUPTIONS:    Breathing/Snoring  Patient snores:Yes  Other people complain about his snoring: Yes  Patient has been told he stops breathing in his sleep:No  He has issues with the following: Getting up to urinate more than once    Movement:  Patient gets pain, discomfort, with an urge to move:  No  It happens when he is resting:  No  It happens more at night:  No  Patient has been told he kicks his legs at night:  No     Behaviors in Sleep:  Driss Pope has experienced the following behaviors while sleeping: Sleepwalking  He has experienced sudden muscle weakness during the day: No      Is there anything else you would like your sleep provider to know: The sleepwalking was in my youth.        CAFFEINE AND  OTHER SUBSTANCES:    Patient consumes caffeinated beverages per day:  1  Last caffeine use is usually: 10am  List of any prescribed or over the counter stimulants that patient takes: none  List of any prescribed or over the counter sleep medication patient takes: none  List of previous sleep medications that patient has tried: none  Patient drinks alcohol to help them sleep: No  Patient drinks alcohol near bedtime: Yes    Family History:  Patient has a family member been diagnosed with a sleep disorder: Yes  2 brothers have sleep apnea, use CPAP         SCALES:    EPWORTH SLEEPINESS SCALE         11/9/2023     2:47 PM    Preston Sleepiness Scale ( DELTA Del Castillo  [id number]<br>ESS - [place]/English - Final version - 21 Nov 07 - [facility] Research Rule.)   Sitting and reading Slight chance of dozing   Watching TV Slight chance of dozing   Sitting, inactive in a public place (e.g. a theatre or a meeting) Slight chance of dozing   As a passenger in a car for an hour without a break Would never doze   Lying down to rest in the afternoon when circumstances permit Would never doze   Sitting and talking to someone Would never doze   Sitting quietly after a lunch without alcohol Slight chance of dozing   In a car, while stopped for a few minutes in traffic Would never doze   Preston Score (MC) 4   Preston Score (Sleep) 4         INSOMNIA SEVERITY INDEX (ELI)          11/9/2023     2:18 PM   Insomnia Severity Index (ELI)   Difficulty falling asleep 0   Difficulty staying asleep 1   Problems waking up too early 1   How SATISFIED/DISSATISFIED are you with your CURRENT sleep pattern? 1   How NOTICEABLE to others do you think your sleep problem is in terms of impairing the quality of your life? 1   How WORRIED/DISTRESSED are you about your current sleep problem? 0   To what extent do you consider your sleep problem to INTERFERE with your daily functioning (e.g. daytime fatigue, mood, ability to function at work/daily chores,  "concentration, memory, mood, etc.) CURRENTLY? 0   ELI Total Score 4       Guidelines for Scoring/Interpretation:  Total score categories:  0-7 = No clinically significant insomnia   8-14 = Subthreshold insomnia   15-21 = Clinical insomnia (moderate severity)  22-28 = Clinical insomnia (severe)  Used via courtesy of www.myhealth.va.gov with permission from Bennett Tran PhD., [facility]      STOP BANG         11/10/2023     1:18 PM   STOP BANG Questionnaire (  2008, the American Society of Anesthesiologists, Inc. [employer]   [facility]: 145/78         GAD7         No data to display                  CAGE-AID         No data to display                CAGE-AID reprinted with permission from the [place] Medical Journal, DIANE Mcgowan and BALJEET Thakkar, \"Conjoint screening questionnaires for alcohol and drug abuse\" [place] Medical Journal 94: 135-140, 1995.      PATIENT HEALTH QUESTIONNAIRE-9 (PHQ - 9)         No data to display                Developed by Gricel López, Fadumo Alvarado, Jared Oleary and colleagues, with an educational ernestine from [employer] No permission required to reproduce, translate, display or distribute.        Allergies:    Allergies   Allergen Reactions    Lisinopril Cough     Persistent cough with Lisinopril       Medications:    Current Outpatient Medications   Medication Sig Dispense Refill    amLODIPine (NORVASC) 5 MG tablet TAKE 1 TABLET DAILY 90 tablet 1    atorvastatin (LIPITOR) 20 MG tablet Take 1 tablet (20 mg) by mouth daily 90 tablet 3    Krill Oil 1000 MG CAPS Take by mouth daily 750 mg to 1000 mg daily      Misc Natural Products (GLUCOSAMINE CHONDROITIN ADV PO) 2-3 tablets daily \"Move free pills\"      Multiple Vitamins-Minerals (MULTIVITAL PO) Once daily      valsartan (DIOVAN) 320 MG tablet TAKE 1 TABLET DAILY.       (REPLACES LOSARTAN) 90 tablet 3    Vitamin D3 (CHOLECALCIFEROL) 125 MCG (5000 UT) tablet Take 1 tablet by mouth daily   "       Problem List:  Patient Active Problem List    Diagnosis Date Noted    IFG (impaired fasting glucose) 06/19/2020     Priority: Medium    Mixed dyslipidemia 09/30/2019     Priority: Medium    Acute gout 03/29/2019     Priority: Medium    Class 2 severe obesity due to excess calories with serious comorbidity in adult (H) 10/16/2017     Priority: Medium    H/O adenomatous polyp of colon 12/09/2016     Priority: Medium    Hypertension, goal below 140/90 10/11/2016     Priority: Medium    Benign non-nodular prostatic hyperplasia with lower urinary tract symptoms 12/14/2015     Priority: Medium    ED (erectile dysfunction) 06/24/2014     Priority: Medium    Advanced directives, counseling/discussion 09/25/2012     Priority: Medium     Patient states has Advance Directive and will bring in a copy to clinic. 9/25/2012         Abnormal PSA 09/25/2012     Priority: Medium    Renal insufficiency syndrome 05/26/2010     Priority: Medium        Past Medical/Surgical History:  Past Medical History:   Diagnosis Date    Chest pain, unspecified     CKD (chronic kidney disease)     CKD (chronic kidney disease) stage 3, GFR 30-59 ml/min (H) 03/25/2011    Complication of anesthesia     H/O adenomatous polyp of colon 12/09/2016    Hypertension goal BP (blood pressure) < 130/80     IFG (impaired fasting glucose) 06/19/2020    Impingement syndrome of both shoulders 03/29/2019    Mixed dyslipidemia 09/30/2019    Obesity     Olecranon bursitis, left elbow     Osteoarthritis     Shortness of breath      Past Surgical History:   Procedure Laterality Date    ARTHROSCOPY KNEE RT/LT      Left knee    COLONOSCOPY  10/04/2011    Procedure:COLONOSCOPY; Colonoscopy, screening; Surgeon:HANNAH COMER; Location:[facility]    COLONOSCOPY  10/04/2011    Procedure:COMBINED COLONOSCOPY, REMOVE TUMOR/POLYP/LESION BY SNARE; Surgeon:HANNAH COMER; Location:[facility]    COLONOSCOPY N/A 12/09/2016    Procedure: COMBINED COLONOSCOPY, SINGLE OR MULTIPLE  BIOPSY/POLYPECTOMY BY BIOPSY;  Surgeon: Duane, William Charles, MD;  Location: [facility]    COLONOSCOPY N/A 8/8/2023    Procedure: COLONOSCOPY, WITH POLYPECTOMY AND BIOPSY;  Surgeon: Joann Carver DO;  Location: [facility]    COLONOSCOPY N/A 8/8/2023    Procedure: COLONOSCOPY, FLEXIBLE, WITH LESION REMOVAL USING SNARE;  Surgeon: Joann Carver DO;  Location: [facility]    COLONOSCOPY WITH CO2 INSUFFLATION N/A 12/09/2016    Procedure: COLONOSCOPY WITH CO2 INSUFFLATION;  Surgeon: Duane, William Charles, MD;  Location: [facility]    COLONOSCOPY WITH CO2 INSUFFLATION N/A 03/15/2017    Procedure: COLONOSCOPY WITH CO2 INSUFFLATION;  Surgeon: Duane, William Charles, MD;  Location: [facility]    COLONOSCOPY WITH CO2 INSUFFLATION N/A 8/8/2023    Procedure: Colonoscopy with CO2 insufflation;  Surgeon: Joann Carver DO;  Location: [facility]    CYSTOSCOPY N/A 10/02/2019    Procedure: CYSTOSCOPY;  Surgeon: Oscar Resendiz MD;  Location:  OR    EYE SURGERY  2017    JOINT REPLACEMENT, HIP RT/LT      Joint Replacement Hip RT/LT-both replced in the last 5 years    LASER KTP GREEN LIGHT PHOTOSELECTIVE VAPORIZATION PROSTATE N/A 10/02/2019    Procedure: CYSTOSCOPY, VAPORIZATION, PROSTATE, PHOTOSELECTIVE, USING 532 NAN[address]ER 80 WATT KTP LASER;  Surgeon: Oscar Resendiz MD;  Location:  OR    TONSILLECTOMY         Social History:  Social History     Socioeconomic History    Marital status:      Spouse name: Not on file    Number of children: Not on file    Years of education: Not on file    Highest education level: Not on file   Occupational History    Not on file   Tobacco Use    Smoking status: Never    Smokeless tobacco: Never   Vaping Use    Vaping Use: Never used   Substance and Sexual Activity    Alcohol use: Yes     Comment: occasionally    Drug use: No    Sexual activity: Yes     Partners: Female     Birth control/protection: Condom     Comment: no protection   Other Topics Concern    Parent/sibling w/ CABG, MI or angioplasty before 65F  55M? No     Service No    Blood Transfusions No    Caffeine Concern No    Occupational Exposure No    Hobby Hazards No    Sleep Concern No     Comment: Does work at night    Stress Concern No    Weight Concern Yes     Comment: Overweight and needing weight loss    Special Diet Yes     Comment: Renal diet to be instructed    Back Care No    Exercise Yes     Comment: dancing mutiple times per week plus home work outs    Bike Helmet No     Comment: NA    Seat Belt Yes    Self-Exams Yes   Social History Narrative    Not on file     Social Determinants of Health     Financial Resource Strain: Not on file   Food Insecurity: Not on file   Transportation Needs: Not on file   Physical Activity: Not on file   Stress: Not on file   Social Connections: Not on file   Interpersonal Safety: Not on file   Housing Stability: Not on file       Family History:  Family History   Problem Relation Age of Onset    Alzheimer Disease Mother     Hypertension Mother     Heart Disease Father         CHF    Alzheimer Disease Paternal Grandmother     Alzheimer Disease Paternal Grandfather     Cancer Brother         esophageal cancer    Prostate Cancer Brother     Diabetes Brother     Prostate Cancer Brother     Diabetes Sister     Unknown/Adopted Son         adopted    Asthma No family hx of     C.A.D. No family hx of     Cerebrovascular Disease No family hx of     Breast Cancer No family hx of     Cancer - colorectal No family hx of     Alcohol/Drug No family hx of     Allergies No family hx of     Anesthesia Reaction No family hx of     Arthritis No family hx of     Blood Disease No family hx of     Cardiovascular No family hx of     Circulatory No family hx of     Congenital Anomalies No family hx of     Connective Tissue Disorder No family hx of     Depression No family hx of     Endocrine Disease No family hx of     Genetic Disorder No family hx of     Eye Disorder No family hx of     Gastrointestinal Disease No family hx of      "Genitourinary Problems No family hx of     Gynecology No family hx of     Lipids No family hx of     Musculoskeletal Disorder No family hx of     Neurologic Disorder No family hx of     Obesity No family hx of     Osteoporosis No family hx of     Psychotic Disorder No family hx of     Respiratory No family hx of     Thyroid Disease No family hx of     Family History Negative No family hx of     Hearing Loss No family hx of     Substance Abuse No family hx of     Mental Illness No family hx of     Anxiety Disorder No family hx of     Hyperlipidemia No family hx of     Coronary Artery Disease No family hx of        Review of Systems:  A complete review of systems reviewed by me is negative with the exeption of what has been mentioned in the history of present illness.  In the last TWO WEEKS have you experienced any of the following symptoms?  Fevers: No  Night Sweats: Yes  Weight Gain: No  Pain at Night: Yes  Double Vision: No  Changes in Vision: No  Difficulty Breathing through Nose: No  Sore Throat in Morning: No  Dry Mouth in the Morning: No  Shortness of Breath Lying Flat: No  Shortness of Breath With Activity: Yes  Awakening with Shortness of Breath: No  Increased Cough: Yes  Heart Racing at Night: No  Swelling in Feet or Legs: No  Diarrhea at Night: No  Heartburn at Night: No  Urinating More than Once at Night: Yes  Losing Control of Urine at Night: No  Joint Pains at Night: No  Headaches in Morning: No  Weakness in Arms or Legs: No  Depressed Mood: No  Anxiety: No     Physical Examination:  Vitals: BP (!) 145/78   Pulse 85   Ht 1.727 m (5' 8\")   Wt 115.2 kg (254 lb)   SpO2 94%   BMI 38.62 kg/m    BMI= Body mass index is 38.62 kg/m .    Neck Cir (cm): 47 cm      GENERAL APPEARANCE: alert and no distress  EYES: Eyes grossly normal to inspection, PERRL, and conjunctivae and sclerae normal  HENT: oropharynx crowded and uvula elongated  NECK: no asymmetry, masses, or scars  RESP: lungs clear to auscultation - " no rales, rhonchi or wheezes  CV: regular rates and rhythm, normal S1 S2, no S3 or S4, and no murmur, click or rub  NEURO: Normal strength and tone, mentation intact, and speech normal  PSYCH: mentation appears normal and affect normal/bright  Mallampati Class: IV.  Tonsillar Stage: 1  hidden by pillars.         Data: All pertinent previous laboratory data reviewed     Recent Labs   Lab Test 06/08/23  0806 06/02/22  0926 04/26/22  0822     --  141   POTASSIUM 4.2 4.5 3.9   CHLORIDE 103  --  108   CO2 25  --  27   ANIONGAP 12  --  6   *  --  102*   BUN 24.0*  --  21   CR 1.35* 1.23 1.13   RAH 9.6  --  9.4       Recent Labs   Lab Test 06/08/23  0806 07/30/21  1046   WBC  --  7.3   RBC  --  4.86   HGB 14.1 14.2   HCT  --  43.2   MCV  --  89   MCH  --  29.2   MCHC  --  32.9   RDW  --  13.6   PLT  --  182       Recent Labs   Lab Test 07/30/21  1046   PROTTOTAL 7.0   ALBUMIN 4.0   BILITOTAL 0.5   ALKPHOS 71   AST 18   ALT 36       TSH (mU/L)   Date Value   04/29/2011 1.74   02/07/2011 0.83     Iron Saturation Index   Date/Time Value Ref Range Status   06/24/2014 09:06 AM 20 15 - 46 % Final     Ferritin   Date/Time Value Ref Range Status   06/24/2014 09:06 AM 23 20 - 300 ng/mL Final     SpO2 Readings from Last 4 Encounters:   11/10/23 94%   08/08/23 94%   06/06/23 94%   08/09/22 96%        Osmin Pope PA-C 11/10/2023

## 2023-12-12 ENCOUNTER — TRANSFERRED RECORDS (OUTPATIENT)
Dept: HEALTH INFORMATION MANAGEMENT | Facility: CLINIC | Age: 73
End: 2023-12-12
Payer: MEDICARE

## 2023-12-28 ENCOUNTER — OFFICE VISIT (OUTPATIENT)
Dept: FAMILY MEDICINE | Facility: CLINIC | Age: 73
End: 2023-12-28
Payer: MEDICARE

## 2023-12-28 VITALS
OXYGEN SATURATION: 93 % | BODY MASS INDEX: 39.18 KG/M2 | HEART RATE: 101 BPM | TEMPERATURE: 98 F | WEIGHT: 257.7 LBS | DIASTOLIC BLOOD PRESSURE: 76 MMHG | SYSTOLIC BLOOD PRESSURE: 144 MMHG | RESPIRATION RATE: 18 BRPM

## 2023-12-28 DIAGNOSIS — I10 HYPERTENSION, GOAL BELOW 140/90: Primary | Chronic | ICD-10-CM

## 2023-12-28 PROCEDURE — 99213 OFFICE O/P EST LOW 20 MIN: CPT | Performed by: INTERNAL MEDICINE

## 2023-12-28 ASSESSMENT — PAIN SCALES - GENERAL: PAINLEVEL: NO PAIN (0)

## 2023-12-28 NOTE — PROGRESS NOTES
"Answers submitted by the patient for this visit:  Hypertension Visit (Submitted on 12/27/2023)  Chief Complaint: Chronic problems general questions HPI Form  Do you check your blood pressure regularly outside of the clinic?: Yes  Are your blood pressures ever more than 140 on the top number (systolic) OR more than 90 on the bottom number (diastolic)? (For example, greater than 140/90): No  Are you following a low salt diet?: No  CKD Visit (Submitted on 12/27/2023)  Chief Complaint: Chronic problems general questions HPI Form  Do you take any over the counter pain medicine?  : Yes   (Submitted on 12/27/2023)  What over the counter medicine are you taking for your pain?  : Tylenol, Advil  How often do you take this medicine?: a few times a month  General Questionnaire (Submitted on 12/27/2023)  Chief Complaint: Chronic problems general questions HPI Form  How many servings of fruits and vegetables do you eat daily?: 4 or more  On average, how many sweetened beverages do you drink each day (Examples: soda, juice, sweet tea, etc.  Do NOT count diet or artificially sweetened beverages)?: 0  How many minutes a day do you exercise enough to make your heart beat faster?: 30 to 60  How many days a week do you exercise enough to make your heart beat faster?: 3 or less  How many days per week do you miss taking your medication?: 0    Assessment & Plan     1.  Essential hypertension.  Blood pressure borderline today but at home it has been normal.  Advised to continue current medication return for follow-up next June for annual physical examination.  Advised to bring his blood pressure machine with so we can calibrate with manual blood pressure check.       BMI:   Estimated body mass index is 39.18 kg/m  as calculated from the following:    Height as of 11/10/23: 1.727 m (5' 8\").    Weight as of this encounter: 116.9 kg (257 lb 11.2 oz).   Weight management plan: Discussed healthy diet and exercise guidelines        Adam NAVARRO" MD Martínez  [facility] VIKTOR Vega is a 73 year old, presenting for the following health issues:  No chief complaint on file.      History of Present Illness       CKD: He uses over the counter pain medication, including Tylenol, Advil, a few times a month.    Hypertension: He presents for follow up of hypertension.  He does check blood pressure  regularly outside of the clinic. Outpatient blood pressures have not been over 140/90. He does not follow a low salt diet.     He eats 4 or more servings of fruits and vegetables daily.He consumes 0 sweetened beverage(s) daily.He exercises with enough effort to increase his heart rate 30 to 60 minutes per day.  He exercises with enough effort to increase his heart rate 3 or less days per week.   He is taking medications regularly.     Patient is coming for follow-up on hypertension.  He checks his blood pressure at home regularly.  He showed me the readings on his phone.  His average blood pressure December was 129/80.  Almost never above 140 x 90.  He offers no symptoms.    Review of Systems   Constitutional, HEENT, cardiovascular, pulmonary, GI, , musculoskeletal, neuro, skin, endocrine and psych systems are negative, except as otherwise noted.      Objective    There were no vitals taken for this visit.  There is no height or weight on file to calculate BMI.  Physical Exam   GENERAL: healthy, alert and no distress  RESP: lungs clear to auscultation - no rales, rhonchi or wheezes  CV: regular rate and rhythm, normal S1 S2, no S3 or S4, no murmur, click or rub, no peripheral edema and peripheral pulses strong  MS: no gross musculoskeletal defects noted, no edema

## 2024-01-02 DIAGNOSIS — I10 HYPERTENSION, GOAL BELOW 140/90: ICD-10-CM

## 2024-01-02 NOTE — TELEPHONE ENCOUNTER
Medication Question or Refill  Patient is going to [place] on Thursday morning.       What medication are you calling about (include dose and sig)?: amLODIPine (NORVASC) 5 MG tablet     Preferred Pharmacy:  [id number] [address] [address]  [address] [address] [address]  Phone: [phone] Fax: [phone]      Controlled Substance Agreement on file:   CSA -- Patient Level:    CSA: None found at the patient level.       Who prescribed the medication?: Dr. Soliz     Do you need a refill? Yes    Could we send this information to you in [employer] or would you prefer to receive a phone call?:   Patient would prefer a phone call   Okay to leave a detailed message?: Yes at Cell number on file:    Telephone Information:   Mobile [phone]

## 2024-01-03 RX ORDER — AMLODIPINE BESYLATE 5 MG/1
5 TABLET ORAL DAILY
Qty: 90 TABLET | Refills: 0 | Status: SHIPPED | OUTPATIENT
Start: 2024-01-03 | End: 2024-06-10

## 2024-03-10 ASSESSMENT — SLEEP AND FATIGUE QUESTIONNAIRES
HOW LIKELY ARE YOU TO NOD OFF OR FALL ASLEEP WHILE SITTING INACTIVE IN A PUBLIC PLACE: WOULD NEVER DOZE
HOW LIKELY ARE YOU TO NOD OFF OR FALL ASLEEP WHILE SITTING QUIETLY AFTER LUNCH WITHOUT ALCOHOL: WOULD NEVER DOZE
HOW LIKELY ARE YOU TO NOD OFF OR FALL ASLEEP WHILE SITTING AND TALKING TO SOMEONE: WOULD NEVER DOZE
HOW LIKELY ARE YOU TO NOD OFF OR FALL ASLEEP WHILE LYING DOWN TO REST IN THE AFTERNOON WHEN CIRCUMSTANCES PERMIT: SLIGHT CHANCE OF DOZING
HOW LIKELY ARE YOU TO NOD OFF OR FALL ASLEEP WHEN YOU ARE A PASSENGER IN A CAR FOR AN HOUR WITHOUT A BREAK: WOULD NEVER DOZE
HOW LIKELY ARE YOU TO NOD OFF OR FALL ASLEEP WHILE SITTING AND READING: SLIGHT CHANCE OF DOZING
HOW LIKELY ARE YOU TO NOD OFF OR FALL ASLEEP WHILE WATCHING TV: SLIGHT CHANCE OF DOZING
HOW LIKELY ARE YOU TO NOD OFF OR FALL ASLEEP IN A CAR, WHILE STOPPED FOR A FEW MINUTES IN TRAFFIC: WOULD NEVER DOZE

## 2024-03-12 ENCOUNTER — THERAPY VISIT (OUTPATIENT)
Dept: SLEEP MEDICINE | Facility: CLINIC | Age: 74
End: 2024-03-12
Attending: PHYSICIAN ASSISTANT
Payer: MEDICARE

## 2024-03-12 DIAGNOSIS — R53.83 MALAISE AND FATIGUE: ICD-10-CM

## 2024-03-12 DIAGNOSIS — I10 ESSENTIAL HYPERTENSION: ICD-10-CM

## 2024-03-12 DIAGNOSIS — E66.812 CLASS 2 SEVERE OBESITY DUE TO EXCESS CALORIES WITH SERIOUS COMORBIDITY AND BODY MASS INDEX (BMI) OF 38.0 TO 38.9 IN ADULT (H): ICD-10-CM

## 2024-03-12 DIAGNOSIS — R06.89 DYSPNEA AND RESPIRATORY ABNORMALITY: ICD-10-CM

## 2024-03-12 DIAGNOSIS — R06.83 SNORING: ICD-10-CM

## 2024-03-12 DIAGNOSIS — E66.01 MORBID OBESITY (H): ICD-10-CM

## 2024-03-12 DIAGNOSIS — Z72.820 LACK OF ADEQUATE SLEEP: ICD-10-CM

## 2024-03-12 DIAGNOSIS — R06.00 DYSPNEA AND RESPIRATORY ABNORMALITY: ICD-10-CM

## 2024-03-12 DIAGNOSIS — E66.01 CLASS 2 SEVERE OBESITY DUE TO EXCESS CALORIES WITH SERIOUS COMORBIDITY AND BODY MASS INDEX (BMI) OF 38.0 TO 38.9 IN ADULT (H): ICD-10-CM

## 2024-03-12 DIAGNOSIS — R53.81 MALAISE AND FATIGUE: ICD-10-CM

## 2024-03-12 PROCEDURE — 95810 POLYSOM 6/> YRS 4/> PARAM: CPT | Performed by: INTERNAL MEDICINE

## 2024-03-21 PROBLEM — M10.9 ACUTE GOUT: Status: RESOLVED | Noted: 2019-03-29 | Resolved: 2024-03-21

## 2024-03-21 PROBLEM — G47.33 OSA (OBSTRUCTIVE SLEEP APNEA): Chronic | Status: ACTIVE | Noted: 2024-03-21

## 2024-03-21 LAB — SLPCOMP: NORMAL

## 2024-03-21 NOTE — PROCEDURES
"         SLEEP STUDY INTERPRETATION  DIAGNOSTIC POLYSOMNOGRAPHY REPORT      Patient: FIDE HAN  YOB: 1950  Study Date: 3/12/2024  MRN: [id number]  Referring Provider: Adam Rollins MD  Ordering Provider: Osmin Pope    Indications for Polysomnography: The patient is a 73 year old Male who is 5' 8\" and weighs 254.0 lbs. His BMI is 38.9, [place] sleepiness scale 4 and neck circumference is 47 cm. A diagnostic polysomnogram was performed to evaluate for loud snoring, difficulty maintaining sleep, crowded oropharynx, family history of CJ and co-morbid HTN    Polysomnogram Data: A full night polysomnogram recorded the standard physiologic parameters including EEG, EOG, EMG, ECG, nasal and oral airflow. Respiratory parameters of chest and abdominal movements were recorded with respiratory inductance plethysmography. Oxygen saturation was recorded by pulse oximetry. Hypopnea scoring rule used: 1B 4%.    Sleep Architecture:    The total recording time of the polysomnogram was 514.7 minutes. The total sleep time was 308.5 minutes. Sleep latency was normal at 20 minutes without the use of a sleep aid. REM latency was 103.5 minutes. Arousal index was increased at 51.2 arousals per hour. Sleep efficiency was decreased at 59.9%. Wake after sleep onset was 185.0 minutes. The patient spent 17.8% of total sleep time in Stage N1, 44.6% in Stage N2, 15.7% in Stage N3, and 21.9% in REM. Time in REM supine was 67.5 minutes.    Respiration:    Events ? The polysomnogram revealed a presence of 95 obstructive, 25 central, and 9 mixed apneas resulting in an apnea index of 25.1 events per hour. There were 144 obstructive hypopneas and 0 central hypopneas resulting in an obstructive hypopnea index of 28.0 and central hypopnea index of 0 events per hour. The combined apnea/hypopnea index was 53.1 events per hour (central apnea/hypopnea index was 4.9 events per hour). The (supine) REM AHI was 96.9 events per hour. The " supine AHI was 59.0, non-supine AHI was 1.9 (32 minutes non-supine) events per hour. The RERA index was 6.2 events per hour.  The RDI was 59.3 events per hour.  Snoring - was reported as moderate to loud.  Respiratory rate and pattern - was notable for normal respiratory rate and pattern.  Sustained Sleep Associated Hypoventilation - Transcutaneous carbon dioxide monitoring was not used, however hypoventilation was suggested by oximetry with sustained hypoxemia during REM supine sleep  Sleep Associated Hypoxemia - (Greater than 5 minutes O2 sat at or below 88%) was present. Baseline oxygen saturation was 88.8%. Lowest oxygen saturation was 58.0%. Time spent less than or equal to 88% was 133.9 minutes. Time spent less than or equal to 89% was 198.9 minutes.    Movement Activity:    Periodic Limb Activity - There were 39 PLMs during the entire study. The PLM index was 7.6 movements per hour. The PLM Arousal Index was 2.1 per hour.  REM EMG Activity - Excessive transient/sustained muscle activity was not present.  Nocturnal Behavior - Abnormal sleep related behaviors were not noted   Bruxism - None apparent.        Cardiac Summary:    The average pulse rate was 67.3 bpm. The minimum pulse rate was 56.0 bpm while the maximum pulse rate was 97.0 bpm.  Arrhythmias were not noted.          Assessment:   Severe obstructive sleep apnea with hypoxemia in the supine position  Possible hypoventilation  Little non-supine sleep observed     Recommendations:  Consider repeat polysomnography with TCM and full night titration of positive airway pressure therapy for the control of sleep disordered breathing.  Treatment could be empirically initiated with Auto?titrating PAP therapy with a range of 7 to 18 cmH2O. Recommend clinical follow up with sleep management team including oximetry or HSAT on PAP  Patient may be a candidate for dental appliance through referral to Sleep Dentistry for the treatment of obstructive sleep apnea and/or  socially disruptive snoring.  Suggest optimizing sleep schedule and avoiding sleep deprivation.  Weight management (if BMI > 30).    Diagnostic Codes:   Obstructive Sleep Apnea G47.33  Sleep Hypoxemia/Hypoventilation G47.36             _____________________________________   Electronically Signed By: Cody Crow MD 3/21/24           Range(%) Time in range (min)   0.0 - 89.0 198.9   0.0 - 88.0 133.9         Stage Min(mm Hg) Max(mm Hg)   Wake - -   NREM(1+2+3) - -   REM - -       Range(mmHg) Time in range (min)   55.0 - 100.0 -   Excluded data <20.0 & >65.0 515.0

## 2024-03-25 ENCOUNTER — OFFICE VISIT (OUTPATIENT)
Dept: DERMATOLOGY | Facility: CLINIC | Age: 74
End: 2024-03-25
Payer: MEDICARE

## 2024-03-25 ENCOUNTER — OFFICE VISIT (OUTPATIENT)
Dept: FAMILY MEDICINE | Facility: CLINIC | Age: 74
End: 2024-03-25
Payer: MEDICARE

## 2024-03-25 VITALS
BODY MASS INDEX: 39.28 KG/M2 | OXYGEN SATURATION: 92 % | DIASTOLIC BLOOD PRESSURE: 82 MMHG | RESPIRATION RATE: 16 BRPM | HEIGHT: 68 IN | SYSTOLIC BLOOD PRESSURE: 142 MMHG | HEART RATE: 100 BPM | TEMPERATURE: 97.9 F | WEIGHT: 259.2 LBS

## 2024-03-25 DIAGNOSIS — I49.9 IRREGULAR HEART BEAT: ICD-10-CM

## 2024-03-25 DIAGNOSIS — E66.01 CLASS 2 SEVERE OBESITY DUE TO EXCESS CALORIES WITH SERIOUS COMORBIDITY IN ADULT, UNSPECIFIED BMI (H): ICD-10-CM

## 2024-03-25 DIAGNOSIS — L81.9 PIGMENTED SKIN LESION OF UNCERTAIN BEHAVIOR OF HEAD: Primary | ICD-10-CM

## 2024-03-25 DIAGNOSIS — N18.31 STAGE 3A CHRONIC KIDNEY DISEASE (H): ICD-10-CM

## 2024-03-25 DIAGNOSIS — D48.5 NEOPLASM OF UNCERTAIN BEHAVIOR OF SKIN: Primary | ICD-10-CM

## 2024-03-25 DIAGNOSIS — E66.812 CLASS 2 SEVERE OBESITY DUE TO EXCESS CALORIES WITH SERIOUS COMORBIDITY IN ADULT, UNSPECIFIED BMI (H): ICD-10-CM

## 2024-03-25 DIAGNOSIS — I10 HYPERTENSION, GOAL BELOW 140/90: ICD-10-CM

## 2024-03-25 PROCEDURE — 88305 TISSUE EXAM BY PATHOLOGIST: CPT | Mod: 26 | Performed by: DERMATOLOGY

## 2024-03-25 PROCEDURE — 11103 TANGNTL BX SKIN EA SEP/ADDL: CPT | Performed by: STUDENT IN AN ORGANIZED HEALTH CARE EDUCATION/TRAINING PROGRAM

## 2024-03-25 PROCEDURE — 93000 ELECTROCARDIOGRAM COMPLETE: CPT | Performed by: FAMILY MEDICINE

## 2024-03-25 PROCEDURE — 99214 OFFICE O/P EST MOD 30 MIN: CPT | Mod: 25 | Performed by: FAMILY MEDICINE

## 2024-03-25 PROCEDURE — 11102 TANGNTL BX SKIN SINGLE LES: CPT | Performed by: STUDENT IN AN ORGANIZED HEALTH CARE EDUCATION/TRAINING PROGRAM

## 2024-03-25 ASSESSMENT — PAIN SCALES - GENERAL
PAINLEVEL: NO PAIN (0)
PAINLEVEL: NO PAIN (0)

## 2024-03-25 NOTE — PROGRESS NOTES
Assessment & Plan     Pigmented skin lesion of uncertain behavior of head  R/o BCC or SCC. Given size and somewhat vascular appearance rec follow up eith derm. Call or mychart if not able to get timely visit  - Adult Dermatology  Referral; Future    Hypertension, goal below 140/90  Borderline control. Home readings normal and confirms his cuff is reading consistently as he brought in today.  Has not taken am bp meds and will due this when he gets home. Does have CKD, utd on labs and on ARB. Follows with pcp for this.   - EKG 12-lead complete w/read - Clinics    Irregular heart beat  Ectopy heard on exam. Sinus rhythm with pvc confirmed on EKG. Asymptomatic. reassurance.     - EKG 12-lead complete w/read - Clinics    Class 2 severe obesity due to excess calories with serious comorbidity in adult, unspecified BMI (H)  Likely contributing to above.                 Subjective   Gary is a 73 year old, presenting for the following health issues:  Growth (Forehead)        3/25/2024     7:18 AM   Additional Questions   Roomed by Gildardo   Accompanied by Self         3/25/2024     7:18 AM   Patient Reported Additional Medications   Patient reports taking the following new medications None     History of Present Illness       Reason for visit:  Growth on forehead    He eats 2-3 servings of fruits and vegetables daily.He consumes 0 sweetened beverage(s) daily. He exercises with enough effort to increase his heart rate 3 or less days per week.   He is taking medications regularly.     Present for 1+ year  Occ ache.   Getting bigger over time  No hx of skin cancer.     Checks bp each morning.   120/78 this am. Average 123/70  Hasn't yet taken his valsartan this morning (takes amlodipine at hs).   Snow blew his driveway this am and feels HR up from that.     No CP, palp, dizziness,  or dyspnea.            Objective    BP (!) 138/102 (BP Location: Right arm, Patient Position: Sitting, Cuff Size: Adult Regular)   Pulse  "100   Temp 97.9  F (36.6  C) (Tympanic)   Resp 16   Ht 1.727 m (5' 8\")   Wt 117.6 kg (259 lb 3.2 oz)   SpO2 92%   BMI 39.41 kg/m    Body mass index is 39.41 kg/m .  Physical Exam   GENERAL: alert and no distress  NECK: no adenopathy, no asymmetry, masses, or scars  RESP: lungs clear to auscultation - no rales, rhonchi or wheezes  CV: regular with ectopy present, normal S1 S2, no S3 or S4, no murmur, click or rub, no peripheral edema  MS: no gross musculoskeletal defects noted, no edema  PSYCH: mentation appears normal, affect normal/bright  SKIN: 1 cm erythemetous domed polypoid almost vurrucous appearing papule     EKG - appears normal, NSR, normal axis, normal intervals, no acute ST/T changes c/w ischemia, no LVH by voltage criteria, occasional PVC noted, unifocal, unchanged from previous tracings except for pvc's.         Signed Electronically by: Susana Zuleta MD    "

## 2024-03-25 NOTE — NURSING NOTE
"Driss Pope's goals for this visit include:   Chief Complaint   Patient presents with    Derm Problem     Gary is here today for lesion of concern. Pt states \"it has been there for at least a year and now within the last week it has been changing\". No family or personal hx of skin cancer.        He requests these members of his care team be copied on today's visit information:     PCP: Adam Soliz    Referring Provider:  Susana Zuleta MD  [address] [place] [address]    There were no vitals taken for this visit.    Do you need any medication refills at today's visit? No    Ritika Aquino RN      "

## 2024-03-25 NOTE — PATIENT INSTRUCTIONS
Wound Care After a Biopsy    What is a skin biopsy?  A skin biopsy allows the doctor to examine a very small piece of tissue under the microscope to determine the diagnosis and the best treatment for the skin condition. A local anesthetic (numbing medicine) is injected with a very small needle into the skin area to be tested. A small piece of skin is taken from the area. Sometimes a suture (stitch) is used.     What are the risks of a skin biopsy?  I will experience scar, bleeding, swelling, pain, crusting and redness. I may experience incomplete removal or recurrence. Risks of this procedure are excessive bleeding, bruising, infection, nerve damage, numbness, thick (hypertrophic or keloidal) scar and non-diagnostic biopsy.    How should I care for my wound for the first 24 hours?  Keep the wound dry and covered for 24 hours  If it bleeds, hold direct pressure on the area for 15 minutes. If bleeding does not stop, call us or go to the emergency room  Avoid strenuous exercise the first 1-2 days or as your doctor instructs you    How should I care for the wound after 24 hours?  After 24 hours, remove the bandage  You may bathe or shower as normal  If you had a scalp biopsy, you can shampoo as usual and can use shower water to clean the biopsy site daily  Clean the wound once a day with gentle soap and water  Do not scrub, be gentle  Apply white petroleum/Vaseline after cleaning the wound with a cotton swab or a clean finger, and keep the site covered with a Bandaid /bandage. Bandages are not necessary with a scalp biopsy  If you are unable to cover the site with a Bandaid /bandage, re-apply ointment 2-3 times a day to keep the site moist. Moisture will help with healing  Avoid strenuous activity for first 1-2 days  Avoid lakes, rivers, pools, and oceans until the stitches are removed or the site is healed    How do I clean my wound?  Wash hands thoroughly with soap or use hand  before all wound care  Clean  the wound with gentle soap and water  Apply white petroleum/Vaseline  to wound after it is clean  Replace the Bandaid /bandage to keep the wound covered for the first few days or as instructed by your doctor  If you had a scalp biopsy, warm shower water to the area on a daily basis should suffice    What should I use to clean my wound?   Cotton-tipped applicators (Qtips )  White petroleum jelly (Vaseline ). Use a clean new container and use Q-tips to apply.  Bandaids  as needed  Gentle soap     How should I care for my wound long term?  Do not get your wound dirty  Keep up with wound care for one week or until the area is healed.  A small scab will form and fall off by itself when the area is completely healed. The area will be red and will become pink in color as it heals. Sun protection is very important for how your scar will turn out. Sunscreen with an SPF 30 or greater is recommended once the area is healed.  You should have some soreness but it should be mild and slowly go away over several days. Talk to your doctor about using tylenol for pain,    When should I call my doctor?  If you have increased:   Pain or swelling  Pus or drainage (clear or slightly yellow drainage is ok)  Temperature over 100F  Spreading redness or warmth around wound    When will I hear about my results?  The biopsy results can take 2 weeks to come back.  Your results will automatically release to [employer] before your provider has even reviewed them.  The clinic will call you with the results, send you a [employer] message, or have you schedule a follow-up clinic or phone time to discuss the results.  Contact our clinics if you do not hear from us in 2 weeks.    Who should I call with questions?  [facility]: [phone]  [facility]: [phone]  For urgent needs outside of business hours call the [facility] at [phone] and ask for the dermatology resident on call

## 2024-03-25 NOTE — PROGRESS NOTES
"[facility] Dermatology Note  Encounter Date: Mar 25, 2024  Office Visit     Reviewed patients past medical history and pertinent chart review prior to patients visit today.     Dermatology Problem List:  NUB,  left forehead s/p shave biopsy 03/25/24  NUB, left cheek, s/p shave biopsy 03/25/24      Family Hx: No  Personal Hx: No  ____________________________________________    Assessment & Plan:     # Neoplasm of uncertain behavior:  left forehead  DDx includes BCC vs sebaceous hyperplasia vs other. Shave biopsy today.    # Neoplasm of uncertain behavior:  left jawline  DDx includes pigmented BCC vs other. Shave biopsy today.    Procedure Note: Biopsy by shave technique  The risks and benefits of the procedure were described to the patient. These include but are not limited to bleeding, infection, scar, incomplete removal, and non-diagnostic biopsy. Verbal informed consent was obtained. The above site(s) was cleansed with an alcohol pad and injected with 1% lidocaine with epinephrine. Once anesthesia was obtained, a biopsy(ies) was performed with Gilette blade. The tissue(s) was placed in a labeled container(s) with formalin and sent to pathology. Hemostasis was achieved with aluminum chloride. Vaseline and a bandage were applied to the wound(s). The patient tolerated the procedure well and was given post biopsy care instructions.      Follow up: RUTH Luu PA-C  [facility]  Dermatology    _______________________________________    CC: Derm Problem (Gary is here today for lesion of concern. Pt states \"it has been there for at least a year and now within the last week it has been changing\". No family or personal hx of skin cancer. )    HPI:  Mr. Driss Pope is a(n) 73 year old male who presents today as a new patient for evaluation of a lesion of concern on his forehead. Patient states that it has been there for about a year but has been changing more recently. It is otherwise " asymptomatic    Patient is otherwise feeling well, without additional skin concerns.      Physical Exam:  SKIN: Focused examination of face was performed.  - left forehead, pink to yellow papule with telangiectasias   - left jawline, pink papule with leaf like structures on dermoscopy            - No other lesions of concern on areas examined.     Medications:  Current Outpatient Medications   Medication    amLODIPine (NORVASC) 5 MG tablet    atorvastatin (LIPITOR) 20 MG tablet    Krill Oil 1000 MG CAPS    Misc Natural Products (GLUCOSAMINE CHONDROITIN ADV PO)    Multiple Vitamins-Minerals (MULTIVITAL PO)    valsartan (DIOVAN) 320 MG tablet    Vitamin D3 (CHOLECALCIFEROL) 125 MCG (5000 UT) tablet     No current facility-administered medications for this visit.      Past Medical History:   Patient Active Problem List   Diagnosis    Stage 3a chronic kidney disease (H)    Advanced directives, counseling/discussion    Benign non-nodular prostatic hyperplasia with lower urinary tract symptoms    Hypertension, goal below 140/90    Class 2 severe obesity due to excess calories with serious comorbidity in adult (H)    Mixed dyslipidemia    IFG (impaired fasting glucose)    History of colonic polyps    CJ (obstructive sleep apnea) - severe (AHI 53)     Past Medical History:   Diagnosis Date    Acute gout 03/29/2019    Chronic kidney disease     Complication of anesthesia     Hyperlipidemia     Hypertension goal BP (blood pressure) < 130/80     Impingement syndrome of both shoulders 03/29/2019    Obesity     CJ (obstructive sleep apnea) - severe (AHI 53) 3/21/2024    Osteoarthritis        CC Susana Zuleta MD  [address] [place] [address] on close of this encounter.

## 2024-03-25 NOTE — LETTER
"    3/25/2024         RE: Driss Pope  [address]  [place] [facility] [id number]        Dear Colleague,    Thank you for referring your patient, Driss Pope, to the [facility]. Please see a copy of my visit note below.    [facility] Dermatology Note  Encounter Date: Mar 25, 2024  Office Visit     Reviewed patients past medical history and pertinent chart review prior to patients visit today.     Dermatology Problem List:  NUB,  left forehead s/p shave biopsy 03/25/24  NUB, left cheek, s/p shave biopsy 03/25/24      Family Hx: No  Personal Hx: No  ____________________________________________    Assessment & Plan:     # Neoplasm of uncertain behavior:  left forehead  DDx includes BCC vs sebaceous hyperplasia vs other. Shave biopsy today.    # Neoplasm of uncertain behavior:  left jawline  DDx includes pigmented BCC vs other. Shave biopsy today.    Procedure Note: Biopsy by shave technique  The risks and benefits of the procedure were described to the patient. These include but are not limited to bleeding, infection, scar, incomplete removal, and non-diagnostic biopsy. Verbal informed consent was obtained. The above site(s) was cleansed with an alcohol pad and injected with 1% lidocaine with epinephrine. Once anesthesia was obtained, a biopsy(ies) was performed with Gilette blade. The tissue(s) was placed in a labeled container(s) with formalin and sent to pathology. Hemostasis was achieved with aluminum chloride. Vaseline and a bandage were applied to the wound(s). The patient tolerated the procedure well and was given post biopsy care instructions.      Follow up: RUTH Luu PA-C  [facility]  Dermatology    _______________________________________    CC: Derm Problem (Gary is here today for lesion of concern. Pt states \"it has been there for at least a year and now within the last week it has been changing\". No family or personal hx of skin " cancer. )    HPI:  Mr. Driss Pope is a(n) 73 year old male who presents today as a new patient for evaluation of a lesion of concern on his forehead. Patient states that it has been there for about a year but has been changing more recently. It is otherwise asymptomatic    Patient is otherwise feeling well, without additional skin concerns.      Physical Exam:  SKIN: Focused examination of face was performed.  - left forehead, pink to yellow papule with telangiectasias   - left jawline, pink papule with leaf like structures on dermoscopy            - No other lesions of concern on areas examined.     Medications:  Current Outpatient Medications   Medication     amLODIPine (NORVASC) 5 MG tablet     atorvastatin (LIPITOR) 20 MG tablet     Krill Oil 1000 MG CAPS     Misc Natural Products (GLUCOSAMINE CHONDROITIN ADV PO)     Multiple Vitamins-Minerals (MULTIVITAL PO)     valsartan (DIOVAN) 320 MG tablet     Vitamin D3 (CHOLECALCIFEROL) 125 MCG (5000 UT) tablet     No current facility-administered medications for this visit.      Past Medical History:   Patient Active Problem List   Diagnosis     Stage 3a chronic kidney disease (H)     Advanced directives, counseling/discussion     Benign non-nodular prostatic hyperplasia with lower urinary tract symptoms     Hypertension, goal below 140/90     Class 2 severe obesity due to excess calories with serious comorbidity in adult (H)     Mixed dyslipidemia     IFG (impaired fasting glucose)     History of colonic polyps     CJ (obstructive sleep apnea) - severe (AHI 53)     Past Medical History:   Diagnosis Date     Acute gout 03/29/2019     Chronic kidney disease      Complication of anesthesia      Hyperlipidemia      Hypertension goal BP (blood pressure) < 130/80      Impingement syndrome of both shoulders 03/29/2019     Obesity      CJ (obstructive sleep apnea) - severe (AHI 53) 3/21/2024     Osteoarthritis        CC Susana Zuleta MD  [address]  [place] [address] on close of this encounter.       Again, thank you for allowing me to participate in the care of your patient.        Sincerely,        Emelia Luu PA-C

## 2024-03-27 LAB
PATH REPORT.COMMENTS IMP SPEC: ABNORMAL
PATH REPORT.COMMENTS IMP SPEC: ABNORMAL
PATH REPORT.COMMENTS IMP SPEC: YES
PATH REPORT.FINAL DX SPEC: ABNORMAL
PATH REPORT.GROSS SPEC: ABNORMAL
PATH REPORT.MICROSCOPIC SPEC OTHER STN: ABNORMAL
PATH REPORT.RELEVANT HX SPEC: ABNORMAL

## 2024-04-01 ENCOUNTER — TELEPHONE (OUTPATIENT)
Dept: DERMATOLOGY | Facility: CLINIC | Age: 74
End: 2024-04-01
Payer: MEDICARE

## 2024-04-01 NOTE — TELEPHONE ENCOUNTER
Excision/Mohs previsit information                                                    Diagnosis: basal cell carcinoma  Site(s): Left jawline  & Left forehead    Over the counter Chlorhexidine surgical soap to wash all skin below the belly button twice before surgery should be recommended for the following:  - Surgical sites below the waist  - Immunosuppressed  - Previous surgical site infection  - Anticipated wound care challenges    Medication & Allergy Information                                                      Review and update allergy and medication list.    Do you take the following medications:  Coumadin, Eliquis, Pradaxa, Xarelto:  NO   -If on Coumadin, INR should be checked within 7 days of surgery.  Range should be 3.5 or less or within therapeutic range.    Past Medical History                                                    Do you currently or have you previously had any of the following conditions:    Hepatitis:  NO  HIV/AIDS:  NO  Prolonged bleeding or bleeding disorder:  NO  Pacemaker or Defibrillator:  NO.    History of artificial or heart valve replacement:  NO  Endocarditis (inflammation of the inner lining of the heart's chambers and valves):  NO  Have you ever had a prosthetic joint infection:  NO  Pregnant or Breastfeeding:  N/A  Mobility device (wheelchair, transfer difficulty): NO    Important Reminders:                                                      Ok to take all of their medications as prescribed  Patients can eat, no need to be fasting  Patient will not be able to get the site wet for 48 hrs  No submerging wound in standing water (lake, pool, bathtub, hot tub) for 2 weeks  No physical activity for 48 hrs (further restrictions will be discussed by MD at time of visit)    If any positives, send to RN for further review  Alina Joiner RN     Pt returned call to the clinic, he would like to schejacielle mohs consult/procedure with us know. Pt scheduled for consult and  procedure. Checklist completed and all questions were negative.    Alina Joiner RN on 4/1/2024 at 1:07 PM      Final Diagnosis  A. Left forehead:  - Basal cell carcinoma, nodular type - (see description)      B. Left jawline:  - Basal cell carcinoma, nodular type - (see description)   Electronically signed by Jimmy Means MD on 3/27/2024 at  4:17 PM        Result Notes     Alina Joiner RN  4/1/2024 11:09 AM CDT Back to Top    Pt called and discussed results. He would like to go to another clinic for mohs. Pt will need to call other clinic to make sure they will take him as a pt and if they need records pt will have to sign release form (can send via email) prior to sending records. New clinic will need to request records form us. Pt called to inform, no answer. LVM for him to return our call at [phone].     Alina Joiner RN on 4/1/2024 at 11:08 AM   Alina Joiner RN  3/28/2024 10:04 AM CDT     Writer called pt, no answer. Left message for pt to return our call at [phone].        Alina Joiner RN on 3/28/2024 at 10:04 AM   Emelia Luu PA-C  3/28/2024  6:58 AM CDT     Please let patient know that biopsy(s) showed the following with the below treatment recommendations:     A. Left forehead, BCC, Mohs Micrographic Surgery needed.  B. Left jawline, BCC, Mohs Micrographic Surgery needed.

## 2024-04-03 ENCOUNTER — VIRTUAL VISIT (OUTPATIENT)
Dept: DERMATOLOGY | Facility: CLINIC | Age: 74
End: 2024-04-03
Payer: MEDICARE

## 2024-04-03 DIAGNOSIS — C44.310 BCC (BASAL CELL CARCINOMA), FACE: Primary | ICD-10-CM

## 2024-04-03 PROCEDURE — 99441 PR PHYSICIAN TELEPHONE EVALUATION 5-10 MIN: CPT | Mod: 93 | Performed by: DERMATOLOGY

## 2024-04-03 NOTE — PROGRESS NOTES
[facility] Dermatology Note  Encounter Date: Apr 3, 2024  Store-and-Forward and Telephone. Location of teledermatologist: [facility].  Start time: 9:02 am. End time: 9:10am.    Dermatologic Surgery Telemedicine Consult Note    Dermatology Problem List:  History of nonmelanoma skin cancer  -Basal cell carcinoma, left forehead, scheduled for Mohs micrographic surgery, 5/8/2024  -Basal cell carcinoma, left jawline, scheduled for Mohs micrographic surgery, 5/8/2024.    CC: Consult For (Discuss Mohs for BCC on left forehead and left jawline.  Mohs scheduled 5/8.)    Subjective: Driss Pope is a 73 year old male who presents today for Mohs micrographic surgery consultation for a recent diagnosis of skin cancer.  - Skin cancer(s): BCC  - Location(s): left forehead and left jawline  - scheduled for MMS 5/8/2024  - no other concerns today    Objective:   Skin: Focused examination of the left forehead and left jawline within the teledermatology photograph(s) on 3/25/24 was performed.   -  left forehead, pink to yellow papule with telangiectasias   - left jawline, pink papule             Path report:   A. Left forehead:  - Basal cell carcinoma, nodular type - (see description)      B. Left jawline:  - Basal cell carcinoma, nodular type - (see description)         Assessment and Plan:     1. Plan for Mohs micrographic surgery for skin cancer(s) above:  *Review lab result(s): Dermpath report   - We discussed the nature of the diagnosis/condition above. We discussed the treatment options, including the risks benefits and expectations of these options. We recommend micrographic surgery as the most effective and most tissue sparing option for treatment, and the patient agrees to proceed with this.  The patient is aware of the risks, benefits and expectations of this procedure. The patient will be scheduled for this procedure, if not already done so.  - We anticipate the following  closure type for left jawline: Linear closure, such as complex linear closure (CLC)  - We anticipate the following closure type for left forehead: Sliding or lifting flap vs. Linear closure, such as complex linear closure    The patient was discussed with and evaluated by attending physician, Fredo Guerrero DO.    [facility]   [facility]     Staff Physician Comments:   I saw the photos and evaluated the patient with the physician assistant (SESAR Harper) and I agree with the assessment and plan. I was present for the key portions of the telephone call.    Fredo Guerrero DO    Department of Dermatology  [facility]: Phone: [phone], Fax:[phone]  [facility]: Phone: [phone], Fax: [phone]

## 2024-04-03 NOTE — NURSING NOTE
Driss Pope's goals for this visit include:   Chief Complaint   Patient presents with    Consult For     Discuss Mohs for BCC on left forehead and left jawline.  Mohs scheduled 5/8.       He requests these members of his care team be copied on today's visit information: n/a    PCP: Adam Soliz    Referring Provider:  No referring provider defined for this encounter.    There were no vitals taken for this visit.    Do you need any medication refills at today's visit? No  Kristan Thompson RN

## 2024-04-03 NOTE — LETTER
4/3/2024         RE: Driss Pope  [address]  [place] [address]        Dear Colleague,    Thank you for referring your patient, Driss Pope, to the [facility]. Please see a copy of my visit note below.    [facility] Dermatology Note  Encounter Date: Apr 3, 2024  Store-and-Forward and Telephone. Location of teledermatologist: [facility].  Start time: 9:02 am. End time: 9:10am.    Dermatologic Surgery Telemedicine Consult Note    Dermatology Problem List:  History of nonmelanoma skin cancer  -Basal cell carcinoma, left forehead, scheduled for Mohs micrographic surgery, 5/8/2024  -Basal cell carcinoma, left jawline, scheduled for Mohs micrographic surgery, 5/8/2024.    CC: Consult For (Discuss Mohs for BCC on left forehead and left jawline.  Mohs scheduled 5/8.)    Subjective: Driss Pope is a 73 year old male who presents today for Mohs micrographic surgery consultation for a recent diagnosis of skin cancer.  - Skin cancer(s): BCC  - Location(s): left forehead and left jawline  - scheduled for MMS 5/8/2024  - no other concerns today    Objective:   Skin: Focused examination of the left forehead and left jawline within the teledermatology photograph(s) on 3/25/24 was performed.   -  left forehead, pink to yellow papule with telangiectasias   - left jawline, pink papule             Path report:   A. Left forehead:  - Basal cell carcinoma, nodular type - (see description)      B. Left jawline:  - Basal cell carcinoma, nodular type - (see description)         Assessment and Plan:     1. Plan for Mohs micrographic surgery for skin cancer(s) above:  *Review lab result(s): Dermpath report   - We discussed the nature of the diagnosis/condition above. We discussed the treatment options, including the risks benefits and expectations of these options. We recommend micrographic surgery as the most effective and most tissue  sparing option for treatment, and the patient agrees to proceed with this.  The patient is aware of the risks, benefits and expectations of this procedure. The patient will be scheduled for this procedure, if not already done so.  - We anticipate the following closure type for left jawline: Linear closure, such as complex linear closure (CLC)  - We anticipate the following closure type for left forehead: Sliding or lifting flap vs. Linear closure, such as complex linear closure    The patient was discussed with and evaluated by attending physician, Fredo Guerrero DO.    [facility]   Dermatology     Staff Physician Comments:   I saw the photos and evaluated the patient with the physician assistant (SESAR Harper) and I agree with the assessment and plan. I was present for the key portions of the telephone call.    Fredo Guerrero DO    Department of Dermatology  [facility]: Phone: [phone], Fax:[phone]  [facility]: Phone: [phone], Fax: [phone]      Again, thank you for allowing me to participate in the care of your patient.        Sincerely,        Fredo Guerrero MD

## 2024-04-03 NOTE — PROGRESS NOTES
"[facility] Dermatology Note  Encounter Date: Apr 3, 2024  Store-and-Forward and Telephone ([phone]). Location of teledermatologist: [facility].  Start time: ***. End time: ***.    Dermatology Problem List:  1. ***    ____________________________________________    Assessment & Plan:     # {Diagnosesderm:700018}.   {kkplans:586231}   - ***     # {Diagnosesderm:287149}.   {kkplans:130576}   - ***     Procedures Performed:    None    Follow-up: {kkfollowup:139491}    {kkstaffinvolved:467132}    ***  ____________________________________________    CC: Consult For (Discuss Mohs for BCC on left forehead and left jawline.  Mohs scheduled 5/8.)    HPI:  Mr. Driss Pope is a(n) 73 year old male who presents today {kknew/return:765019} for ***    Patient is otherwise feeling well, without additional skin concerns.    Labs Reviewed:  ***N/A    Physical Exam:  Vitals: There were no vitals taken for this visit.  SKIN: Teledermatology photos were reviewed; image quality and interpretability: ***acceptable. Image date: ***.  - ***  - No other lesions of concern on areas examined.     Medications:  Current Outpatient Medications   Medication Sig Dispense Refill    amLODIPine (NORVASC) 5 MG tablet Take 1 tablet (5 mg) by mouth daily 90 tablet 0    atorvastatin (LIPITOR) 20 MG tablet Take 1 tablet (20 mg) by mouth daily 90 tablet 3    Krill Oil 1000 MG CAPS Take by mouth daily 750 mg to 1000 mg daily      Misc Natural Products (GLUCOSAMINE CHONDROITIN ADV PO) 2-3 tablets daily \"Move free pills\"      Multiple Vitamins-Minerals (MULTIVITAL PO) Once daily      valsartan (DIOVAN) 320 MG tablet TAKE 1 TABLET DAILY.       (REPLACES LOSARTAN) 90 tablet 3    Vitamin D3 (CHOLECALCIFEROL) 125 MCG (5000 UT) tablet Take 1 tablet by mouth daily       No current facility-administered medications for this visit.      Past Medical/Surgical History:   Patient Active Problem List   Diagnosis    " Stage 3a chronic kidney disease (H)    Advanced directives, counseling/discussion    Benign non-nodular prostatic hyperplasia with lower urinary tract symptoms    Hypertension, goal below 140/90    Class 2 severe obesity due to excess calories with serious comorbidity in adult (H)    Mixed dyslipidemia    IFG (impaired fasting glucose)    History of colonic polyps    CJ (obstructive sleep apnea) - severe (AHI 53)     Past Medical History:   Diagnosis Date    Acute gout 03/29/2019    Chronic kidney disease     Complication of anesthesia     Hyperlipidemia     Hypertension goal BP (blood pressure) < 130/80     Impingement syndrome of both shoulders 03/29/2019    Obesity     CJ (obstructive sleep apnea) - severe (AHI 53) 3/21/2024    Osteoarthritis        CC No referring provider defined for this encounter. on close of this encounter.

## 2024-04-26 ENCOUNTER — TRANSFERRED RECORDS (OUTPATIENT)
Dept: HEALTH INFORMATION MANAGEMENT | Facility: CLINIC | Age: 74
End: 2024-04-26
Payer: MEDICARE

## 2024-04-30 ENCOUNTER — TELEPHONE (OUTPATIENT)
Dept: FAMILY MEDICINE | Facility: CLINIC | Age: 74
End: 2024-04-30
Payer: MEDICARE

## 2024-04-30 NOTE — TELEPHONE ENCOUNTER
General Call    Contacts         Type Contact Phone/Fax    04/30/2024 10:58 AM CDT Phone (Incoming) Gary Pope (Self) [phone] (M)          Reason for Call:  Sleep Study Results    What are your questions or concerns:  Sleep study done on 03/12/2024, pt is wanting someone to explain the results to him. [facility] but wants to know what to do in the mean time if there is anything.   .   Date of last appointment with provider: 11/10/2023    Could we send this information to you in SetJam or would you prefer to receive a phone call?:   Patient would prefer a phone call   Okay to leave a detailed message?: Yes at Cell number on file:    Telephone Information:   Mobile [phone]

## 2024-05-08 ENCOUNTER — OFFICE VISIT (OUTPATIENT)
Dept: DERMATOLOGY | Facility: CLINIC | Age: 74
End: 2024-05-08
Payer: MEDICARE

## 2024-05-08 VITALS — SYSTOLIC BLOOD PRESSURE: 129 MMHG | HEART RATE: 76 BPM | DIASTOLIC BLOOD PRESSURE: 83 MMHG

## 2024-05-08 DIAGNOSIS — C44.319 BASAL CELL CARCINOMA (BCC) OF JAWLINE: ICD-10-CM

## 2024-05-08 DIAGNOSIS — C44.319 BASAL CELL CARCINOMA (BCC) OF LEFT FOREHEAD: Primary | ICD-10-CM

## 2024-05-08 PROCEDURE — 12053 INTMD RPR FACE/MM 5.1-7.5 CM: CPT | Performed by: DERMATOLOGY

## 2024-05-08 PROCEDURE — 17311 MOHS 1 STAGE H/N/HF/G: CPT | Performed by: DERMATOLOGY

## 2024-05-08 ASSESSMENT — PAIN SCALES - GENERAL: PAINLEVEL: NO PAIN (0)

## 2024-05-08 NOTE — PATIENT INSTRUCTIONS
Caring for your skin after surgery    After your surgery, a pressure bandage will be placed over the area. This will prevent bleeding. Please follow these instructions over the next 1 to 2 weeks. Following this regimen will help to prevent complications as your wound heals.     For the first 48 hours after your surgery:    Leave the pressure dressing on and keep it dry. If it should come loose, you may re-tape it, but do not take it off.  Relax and take it easy. Do not do any vigorous exercise, heavy lifting or bending forward. This could cause the wound to bleed.  Post-operative pain is usually mild. You may alternate between 1000 mg of Tylenol (acetaminophen) and 400 mg of Ibuprofen every 4 hours.  Do not take more than 4,000 mg of acetaminophen in a 24-hour period or 3200 mg of Ibuprofen in a 24-hr period.  Avoid alcohol and vitamin E as these may increase your tendency to bleed.  You may put an ice pack around the bandaged area for 20 minutes at a time as needed. This may help reduce swelling, bruising, and pain. Make sure the ice pack is waterproof so that the pressure bandage doesn't get wet.  You may see a small amount of drainage or blood on your pressure bandage. This is normal. However:  If drainage or bleeding continues or saturates the bandage, you will need to apply firm pressure over the bandage with a clean washcloth for 15 minutes.  If bleeding continues after applying pressure for 15 minutes, apply an ice pack with gentle pressure to the bandaged area for another 15 minutes.  If bleeding still continues, call our office or go to the nearest emergency room.    48 Hours After Surgery:  Carefully remove the pressure bandage. If it seems sticky or too difficult to get off, you may need to soak it off in the shower.  Wash wound with a mild soap and water.  Use caution when washing the wound, be gentle and do not let the forceful shower stream hit the wound directly.  Pat dry.  Apply Vaseline (from a new  container or tube) over the suture line with a Q-tip.  Cover the site with a bandage.  Do this daily until the sutures have been dissolved.      What to expect:    The first couple of days your wound may be tender and may bleed slightly when doing wound care.  There may be swelling and bruising around the wound, especially if it is near the eyes. For your comfort, you may apply ice or cold compresses to the area.  The area around your wound may be numb for several weeks or even months.  You may experience periodic sharp pain or mild itching around the wound as it heals.   The suture line will look dark pink at first and the edges of the wound will be reddened. This will lighten up each day.    Call Us If:    You have bleeding that will not stop after applying pressure and ice.  You have pain that is not controlled with Tylenol and Ibuprofen.  You have signs or symptoms of an infection such as fever over 100 degrees Fahrenheit, redness, warmth or foul-smelling drainage from the wound    [facility]: [phone]   [facility]: [phone]  For urgent needs outside of business hours call the [facility] at [phone] and ask to speak with the dermatology resident on call

## 2024-05-08 NOTE — LETTER
5/8/2024         RE: Driss Pope  [address]  [place] [address]        Dear Colleague,    Thank you for referring your patient, Driss Pope, to the [facility]. Please see a copy of my visit note below.    Driss Pope's chief complaint for this visit includes:  Chief Complaint   Patient presents with     Mohs     BCC x 2 - left jawline & left forehead     PCP: Adam Soliz    Referring Provider:  No referring provider defined for this encounter.    /83   Pulse 76   No Pain (0)        Allergies   Allergen Reactions     Lisinopril Cough     Persistent cough with Lisinopril         Do you need any medication refills at today's visit? No    Alyson Dominguez CMA          [facility] [place] Procedure Note    Dermatology Problem List:  History of nonmelanoma skin cancer  -Basal cell carcinoma, left forehead, s/p bx 3/25/24 , s/p Mohs 5/8/24   -Basal cell carcinoma, left jawline, s/p bx 3/25/24 , s/p Mohs 5/8/24   _______________________________________________    Date of Service:  May 8, 2024  Surgery: Mohs micrographic surgery    Case 1  Repair Type: intermediate  Repair Size: 3.5 cm  Suture Material: 4-0 monocryl, 5-0 monocryl, Fast Absorbing Gut 5-0  Tumor Type: BCC - Basal cell carcinoma  Location: L jawline  Derm-Path Accession #: [id number]  PreOp Size: 0.6x0.6 cm  PostOp Size: 1.9x1.3 cm  Mohs Accession #: [id number]  Level of Defect: fat      Procedure:  We discussed the principles of treatment and most likely complications including scarring, bleeding, infection, swelling, pain, crusting, nerve damage, large wound,  incomplete excision, wound dehiscence,  nerve damage, recurrence, and a second procedure may be recommended to obtain the best cosmetic or functional result.    Informed consent was obtained and the patient underwent the procedure as follows:  The patient was placed supine on the operating table.  The  cancer was identified, outlined with a marker, and verified by the patient.  The entire surgical field was prepped with ChoraPrep.  The surgical site was anesthetized using Lidocaine 1% with epi 1:100,000.      The area of clinically apparent tumor was debulked. The layer of tissue was then surgically excised using a #15 blade and was then transferred onto a specimen sheet maintaining the orientation of the specimen. Hemostasis was obtained using bipolar electrocoagulation. The wound site was then covered with a dressing while the tissue samples were processed for examination.    The excised tissue was transported to the Mohs histology laboratory maintaining the tissue orientation.  The tissue specimen was relaxed so that the entire surgical margin was in a a single horizontal plane for sectioning and inked for precise mapping.  A precise reference map was drawn to reflect the sectioning of the specimen, colored inking of the margins, and orientation on the patient. The tissue was processed using horizontal sectioning of the base and continuous peripheral margins.  The histopathologic sections were reviewed in conjunction with the reference map.    Total blocks: 1    Total slides:  1    There were no cancer cells visualized on examination, therefore Mohs surgery was complete.    Reconstruction: Intermediate Linear Closure      The patient was taken to the operative suite and placed supine on the operating room table.  The defect was identified.  Appropriate markings were made with a marking pen to plan the repair.  The area was infiltrated with Lidocaine 1% with epi 1:100,000 and prepped with Hibiclens and draped with sterile towels.     The wound was debeveled and undermined widely.  Cones were excised within relaxed skin tension lines on both sides of the defect.  Hemostasis was obtained using electrofulguration.  Deep subcutaneous tissues were then approximated using 4-0 Monocryl and 5-0 Monocryl buried vertical  mattress sutures.  The wound edges were then approximated additional  buried sutures were placed in a similar fashion where needed.  Percutaneous simple running 5-0 FAG sutures were carefully placed for maximum eversion and meticulous approximation.    Repair Size: 3.5 cm    The wound was cleansed with saline and ointment was applied along the wound surface.     A sterile pressure dressing was applied.  Wound care instructions were given verbally and in writing.  The patient left the operating suite in stable condition.  Patient was informed that additional refinement of the resulting surgical scar may be used as a second stage of this reconstruction.     The attending surgeon was present for the entire procedure and always immediately available.    [facility] Dermatologic [facility] [place] Procedure Note      Date of Service:  May 8, 2024  Surgery: Mohs micrographic surgery    Case 2  Repair Type: intermediate  Repair Size: 3.4 cm  Suture Material: Fast Absorbing Gut 5-0  Tumor Type: BCC - Basal cell carcinoma (nodular)  Location: L forehead  Derm-Path Accession #: [id number]  PreOp Size: 1.0x0.6 cm  PostOp Size: 1.6x1.3 cm  Mohs Accession #: [id number]  Level of Defect: fascia      Procedure:  We discussed the principles of treatment and most likely complications including scarring, bleeding, infection, swelling, pain, crusting, nerve damage, large wound,  incomplete excision, wound dehiscence,  nerve damage, recurrence, and a second procedure may be recommended to obtain the best cosmetic or functional result.    Informed consent was obtained and the patient underwent the procedure as follows:  The patient was placed supine on the operating table.  The cancer was identified, outlined with a marker, and verified by the patient.  The entire surgical field was prepped with ChoraPrep.  The surgical site was anesthetized using Lidocaine 1% with epi 1:100,000.      The area of clinically apparent tumor  was debulked. The layer of tissue was then surgically excised using a #15 blade and was then transferred onto a specimen sheet maintaining the orientation of the specimen. Hemostasis was obtained using bipolar electrocoagulation. The wound site was then covered with a dressing while the tissue samples were processed for examination.    The [facility] maintaining the tissue orientation.  The tissue specimen was relaxed so that the entire surgical margin was in a a single horizontal plane for sectioning and inked for precise mapping.  A precise reference map was drawn to reflect the sectioning of the specimen, colored inking of the margins, and orientation on the patient.  The tissue was processed using horizontal sectioning of the base and continuous peripheral margins.  The histopathologic sections were reviewed in conjunction with the reference map.    Total blocks: 1    Total slides:  2    There were no cancer cells visualized on examination, therefore Mohs surgery was complete.    Reconstruction: Intermediate Linear Closure      The patient was taken to the operative suite and placed supine on the operating room table.  The defect was identified.  Appropriate markings were made with a marking pen to plan the repair.  The area was infiltrated with Lidocaine 1% with epi 1:100,000 and prepped with Hibiclens and draped with sterile towels.     The wound was debeveled and undermined widely.  Cones were excised within relaxed skin tension lines on both sides of the defect.  Hemostasis was obtained using electrofulguration.  Deep subcutaneous tissues were then approximated using 4-0 Monocryl and 5-0 Monocryl buried vertical mattress sutures.  The wound edges were then approximated additional  buried sutures were placed in a similar fashion where needed.  Percutaneous simple running 5-0 FAG sutures were carefully placed for maximum eversion and meticulous  approximation.    Repair Size: 3.4 cm    The wound was cleansed with saline and ointment was applied along the wound surface.     A sterile pressure dressing was applied.  Wound care instructions were given verbally and in writing.  The patient left the operating suite in stable condition.  Patient was informed that additional refinement of the resulting surgical scar may be used as a second stage of this reconstruction.     The attending surgeon was present for the entire procedure and always immediately available.    Scribe Disclosure:   I, Francia Rodriguez, am serving as a scribe; to document services personally performed by Dr. Fredo Guerrero - -based on data collection and the provider's statements to me.     Provider Disclosure:   The documentation recorded by the scribe accurately reflects the services I personally performed and the decisions made by me. I personally performed the procedures today.    Fredo Guerrero DO    Department of Dermatology  [facility]: Phone: [phone], Fax:[phone]  [facility]: Phone: [phone], Fax: [phone]    Care and Laboratory Testing Performed at:  [facility]   Dermatology Clinic  [address] [place] [address]      Again, thank you for allowing me to participate in the care of your patient.        Sincerely,        Fredo Guerrero MD

## 2024-05-08 NOTE — NURSING NOTE
The following medication was given:     MEDICATION:  Lidocaine with epinephrine 1% 1:912349  ROUTE: SQ  SITE: see procedure note  DOSE: 9.5 mL  LOT #: [id number]  : [employer]  EXPIRATION DATE: 4/30/25  NDC#: 12703-578-89  Was there drug waste? No  Multi-dose vial: Yes    Vaseline and pressure dressing applied to Mohs site on left forehead and left jawline.  Wound care instructions reviewed with patient and AVS provided.  Patient verbalized understanding.  Patient will follow up for suture removal: N/A.  No further questions or concerns at this time.    Kristan Thompson RN  May 8, 2024

## 2024-05-08 NOTE — PROGRESS NOTES
[facility] Dermatologic Surgery Clinic [place] Procedure Note    Dermatology Problem List:  History of nonmelanoma skin cancer  -Basal cell carcinoma, left forehead, s/p bx 3/25/24 , s/p Mohs 5/8/24   -Basal cell carcinoma, left jawline, s/p bx 3/25/24 , s/p Mohs 5/8/24   _______________________________________________    Date of Service:  May 8, 2024  Surgery: Mohs micrographic surgery    Case 1  Repair Type: intermediate  Repair Size: 3.5 cm  Suture Material: 4-0 monocryl, 5-0 monocryl, Fast Absorbing Gut 5-0  Tumor Type: BCC - Basal cell carcinoma  Location: L jawline  Derm-Path Accession #: [id number]  PreOp Size: 0.6x0.6 cm  PostOp Size: 1.9x1.3 cm  Mohs Accession #: [id number]  Level of Defect: fat      Procedure:  We discussed the principles of treatment and most likely complications including scarring, bleeding, infection, swelling, pain, crusting, nerve damage, large wound,  incomplete excision, wound dehiscence,  nerve damage, recurrence, and a second procedure may be recommended to obtain the best cosmetic or functional result.    Informed consent was obtained and the patient underwent the procedure as follows:  The patient was placed supine on the operating table.  The cancer was identified, outlined with a marker, and verified by the patient.  The entire surgical field was prepped with ChoraPrep.  The surgical site was anesthetized using Lidocaine 1% with epi 1:100,000.      The area of clinically apparent tumor was debulked. The layer of tissue was then surgically excised using a #15 blade and was then transferred onto a specimen sheet maintaining the orientation of the specimen. Hemostasis was obtained using bipolar electrocoagulation. The wound site was then covered with a dressing while the tissue samples were processed for examination.    The excised tissue was transported to the Mohs histology laboratory maintaining the tissue orientation.  The tissue specimen was relaxed so that the  entire surgical margin was in a a single horizontal plane for sectioning and inked for precise mapping.  A precise reference map was drawn to reflect the sectioning of the specimen, colored inking of the margins, and orientation on the patient. The tissue was processed using horizontal sectioning of the base and continuous peripheral margins.  The histopathologic sections were reviewed in conjunction with the reference map.    Total blocks: 1    Total slides:  1    There were no cancer cells visualized on examination, therefore Mohs surgery was complete.    Reconstruction: Intermediate Linear Closure      The patient was taken to the operative suite and placed supine on the operating room table.  The defect was identified.  Appropriate markings were made with a marking pen to plan the repair.  The area was infiltrated with Lidocaine 1% with epi 1:100,000 and prepped with Hibiclens and draped with sterile towels.     The wound was debeveled and undermined widely.  Cones were excised within relaxed skin tension lines on both sides of the defect.  Hemostasis was obtained using electrofulguration.  Deep subcutaneous tissues were then approximated using 4-0 Monocryl and 5-0 Monocryl buried vertical mattress sutures.  The wound edges were then approximated additional  buried sutures were placed in a similar fashion where needed.  Percutaneous simple running 5-0 FAG sutures were carefully placed for maximum eversion and meticulous approximation.    Repair Size: 3.5 cm    The wound was cleansed with saline and ointment was applied along the wound surface.     A sterile pressure dressing was applied.  Wound care instructions were given verbally and in writing.  The patient left the operating suite in stable condition.  Patient was informed that additional refinement of the resulting surgical scar may be used as a second stage of this reconstruction.     The attending surgeon was present for the entire procedure and always  immediately available.    [facility] Dermatologic Surgery [facility] [place] Procedure Note      Date of Service:  May 8, 2024  Surgery: Mohs micrographic surgery    Case 2  Repair Type: intermediate  Repair Size: 3.4 cm  Suture Material: Fast Absorbing Gut 5-0  Tumor Type: BCC - Basal cell carcinoma (nodular)  Location: L forehead  Derm-Path Accession #: [id number]  PreOp Size: 1.0x0.6 cm  PostOp Size: 1.6x1.3 cm  Mohs Accession #: [id number]  Level of Defect: fascia      Procedure:  We discussed the principles of treatment and most likely complications including scarring, bleeding, infection, swelling, pain, crusting, nerve damage, large wound,  incomplete excision, wound dehiscence,  nerve damage, recurrence, and a second procedure may be recommended to obtain the best cosmetic or functional result.    Informed consent was obtained and the patient underwent the procedure as follows:  The patient was placed supine on the operating table.  The cancer was identified, outlined with a marker, and verified by the patient.  The entire surgical field was prepped with ChoraPrep.  The surgical site was anesthetized using Lidocaine 1% with epi 1:100,000.      The area of clinically apparent tumor was debulked. The layer of tissue was then surgically excised using a #15 blade and was then transferred onto a specimen sheet maintaining the orientation of the specimen. Hemostasis was obtained using bipolar electrocoagulation. The wound site was then covered with a dressing while the tissue samples were processed for examination.    The excised tissue was transported to the Mohs histology laboratory maintaining the tissue orientation.  The tissue specimen was relaxed so that the entire surgical margin was in a a single horizontal plane for sectioning and inked for precise mapping.  A precise reference map was drawn to reflect the sectioning of the specimen, colored inking of the margins, and orientation on the patient.   The tissue was processed using horizontal sectioning of the base and continuous peripheral margins.  The histopathologic sections were reviewed in conjunction with the reference map.    Total blocks: 1    Total slides:  2    There were no cancer cells visualized on examination, therefore Mohs surgery was complete.    Reconstruction: Intermediate Linear Closure      The patient was taken to the operative suite and placed supine on the operating room table.  The defect was identified.  Appropriate markings were made with a marking pen to plan the repair.  The area was infiltrated with Lidocaine 1% with epi 1:100,000 and prepped with Hibiclens and draped with sterile towels.     The wound was debeveled and undermined widely.  Cones were excised within relaxed skin tension lines on both sides of the defect.  Hemostasis was obtained using electrofulguration.  Deep subcutaneous tissues were then approximated using 4-0 Monocryl and 5-0 Monocryl buried vertical mattress sutures.  The wound edges were then approximated additional  buried sutures were placed in a similar fashion where needed.  Percutaneous simple running 5-0 FAG sutures were carefully placed for maximum eversion and meticulous approximation.    Repair Size: 3.4 cm    The wound was cleansed with saline and ointment was applied along the wound surface.     A sterile pressure dressing was applied.  Wound care instructions were given verbally and in writing.  The patient left the operating suite in stable condition.  Patient was informed that additional refinement of the resulting surgical scar may be used as a second stage of this reconstruction.     The attending surgeon was present for the entire procedure and always immediately available.    Scribe Disclosure:   Francia JOHNSON, am serving as a scribe; to document services personally performed by Dr. Fredo Guerrero - -based on data collection and the provider's statements to me.     Provider Disclosure:   The  documentation recorded by the scribe accurately reflects the services I personally performed and the decisions made by me. I personally performed the procedures today.    Fredo Guerrero DO    Department of Dermatology  [facility]: Phone: [phone], Fax:[phone]  [facility]: Phone: [phone], Fax: [phone]    Care and Laboratory Testing Performed at:  [facility]   Dermatology Clinic  [address] [place] [address]

## 2024-05-08 NOTE — PROGRESS NOTES
Driss Pope's chief complaint for this visit includes:  Chief Complaint   Patient presents with    Mohs     BCC x 2 - left jawline & left forehead     PCP: Adam Soliz    Referring Provider:  No referring provider defined for this encounter.    /83   Pulse 76   No Pain (0)        Allergies   Allergen Reactions    Lisinopril Cough     Persistent cough with Lisinopril         Do you need any medication refills at today's visit? No    Alyson Dominguez, CMA

## 2024-05-10 ENCOUNTER — TELEPHONE (OUTPATIENT)
Dept: DERMATOLOGY | Facility: CLINIC | Age: 74
End: 2024-05-10
Payer: MEDICARE

## 2024-05-10 NOTE — TELEPHONE ENCOUNTER
SUBJECTIVE/OBJECTIVE:                                                    Gary is 1 day s/p Mohs x 2.     ASSESSMENT:                                                       NURSING ASSESSMENT:     Wound location: left jawline, left forehead     What are you doing to manage your pain?  Patient denies pain  Is it helping?  N/A  Are you applying ice? yes  Have you had any noticeable bleeding through the bandage?  No. Patient removed the pressure bandage today and completed wound care.  Do you have any concerns?  No     PLAN:                                                       Wound care directions reviewed.  Post op appointment confirmed.  Next skin check has been scheduled.     Kristan Thompson RN

## 2024-05-16 ENCOUNTER — ALLIED HEALTH/NURSE VISIT (OUTPATIENT)
Dept: DERMATOLOGY | Facility: CLINIC | Age: 74
End: 2024-05-16
Payer: MEDICARE

## 2024-05-16 DIAGNOSIS — Z51.89 VISIT FOR WOUND CHECK: Primary | ICD-10-CM

## 2024-05-16 PROCEDURE — 99207 PR NO CHARGE NURSE ONLY: CPT | Performed by: DERMATOLOGY

## 2024-05-16 NOTE — NURSING NOTE
Driss Pope comes into clinic today at the request of Dr. Guerrero, ordering provider for a wound check.    Subjective: Driss Pope is 8 days s/p Mohs for BCC on the left jawline with intermediate repair and BCC on the left forehead with intermediate repair.  - feels like the areas have been healing very well  - denies pain, swelling, bleeding  - notes bruising along the jawline  - has a follow-up appt with general dermatology scheduled for July     Objective:   Focused examination of the left cheek and forehead were performed.   - The surgical sites are clean and dry. There is no surrounding erythema, crusting or purulence.  -No clinical evidence of infection noted today.  -Bruising along jawline and left medial eyebrow noted.     Assessment and Plan:   The patient's surgery sites are healing very well. No evidence of infection on examination today.  Wounds cleansed with Hibiclens and rinsed with sterile saline.  Photo obtained.  Vaseline applied.  The patient was told to continue with wound cares for one more week.  Offered 3 candi scar evaluation.  Patient will follow up for next skin check as scheduled.     This service provided today was under the supervising provider of the day Dr. Guerrero, who was available if needed.     Kristan Thompson RN

## 2024-06-09 DIAGNOSIS — G47.33 OSA (OBSTRUCTIVE SLEEP APNEA): Primary | ICD-10-CM

## 2024-06-10 DIAGNOSIS — I10 HYPERTENSION, GOAL BELOW 140/90: ICD-10-CM

## 2024-06-11 RX ORDER — AMLODIPINE BESYLATE 5 MG/1
5 TABLET ORAL DAILY
Qty: 90 TABLET | Refills: 3 | Status: SHIPPED | OUTPATIENT
Start: 2024-06-11

## 2024-06-26 ENCOUNTER — TELEPHONE (OUTPATIENT)
Dept: FAMILY MEDICINE | Facility: CLINIC | Age: 74
End: 2024-06-26
Payer: MEDICARE

## 2024-06-26 NOTE — TELEPHONE ENCOUNTER
LVM for clarification, received a refill script form from [email or web address] stated pt requesting a 90 days refill for amlodipine 5 MG but this medication was sent to [facility] on 6/11.       If pt called back and want to process, please pend medication and forward to [facility] refill pool. Thanks

## 2024-07-08 DIAGNOSIS — I10 HYPERTENSION, GOAL BELOW 140/90: ICD-10-CM

## 2024-07-09 RX ORDER — ATORVASTATIN CALCIUM 20 MG/1
20 TABLET, FILM COATED ORAL DAILY
Qty: 90 TABLET | Refills: 3 | Status: SHIPPED | OUTPATIENT
Start: 2024-07-09

## 2024-07-09 RX ORDER — VALSARTAN 320 MG/1
TABLET ORAL
Qty: 90 TABLET | Refills: 3 | Status: SHIPPED | OUTPATIENT
Start: 2024-07-09

## 2024-07-17 NOTE — PROGRESS NOTES
[facility] Dermatology Note  Encounter Date: Jul 24, 2024  Office Visit     Reviewed patients past medical history and pertinent chart review prior to patients visit today.     Dermatology Problem List:  Last skin check: 07/24/2024    0. NUB Right upper back and Left postauricular area, shave biopsy 07/24/24 .    History of nonmelanoma skin cancer  -Basal cell carcinoma, left forehead, s/p bx 3/25/24 , s/p Mohs 5/8/24   -Basal cell carcinoma, left jawline, s/p bx 3/25/24 , s/p Mohs 5/8/24     Family Hx: No family history of skin cancer.   Social Hx: enjoys golfing and teaches ballroom dance  ____________________________________________    Assessment & Plan:     # Neoplasm of uncertain behavior:  right upper back DDx includes BCC  vs SK. Shave biopsy today.  # Neoplasm of uncertain behavior:  left postauricular area  DDx includes inflammatory papule vs NMSC.     Procedure Note: Biopsy by shave technique  The risks and benefits of the procedure were described to the patient. These include but are not limited to bleeding, infection, scar, incomplete removal, and non-diagnostic biopsy. Verbal informed consent was obtained. The above site(s) was cleansed with an alcohol pad and injected with 1% lidocaine with epinephrine. Once anesthesia was obtained, a biopsy(ies) was performed with Gilette blade. The tissue(s) was placed in a labeled container(s) with formalin and sent to pathology. Hemostasis was achieved with aluminum chloride. Vaseline and a bandage were applied to the wound(s). The patient tolerated the procedure well and was given post biopsy care instructions.    # Personal history of nonmelanoma skin cancer  - No signs of recurrence. Continued observation recommended.   - Sun protection: Counseled SPF 30+ sunscreen, UPF clothing, sun avoidance, tanning bed avoidance.    # Multiple nevi, trunk and extremities  # Solar lentigines  - Nevi demonstrate no concerning features on dermoscopy. We  discussed the importance of self exams at home.   - ABCDEs: Counseled ABCDEs of melanoma: Asymmetry, Border (irregularity), Color (not uniform, changes in color), Diameter (greater than 6 mm which is about the size of a pencil eraser), and Evolving (any changes in preexisting moles).    # Cherry angiomas  # Seborrheic keratoses  - We discussed the benign nature of the skin lesions. No treatment required. Continued observation recommended. Follow up with any concerns.        Follow-up:  6 months for follow up full body skin exam, as needed for new or changing lesions or new concerns    All risks, benefits and alternatives were discussed with patient.  Patient is in agreement and understands the assessment and plan.  All questions were answered.  Emelia Chau PA-C  [facility] Dermatology    ____________________________________________    CC: Skin Check (FBSC- white spots on left cheek. Left ear had something that felt like a pimple but nothing came out but blood. /Hx of NMSC. )    HPI:  Mr. Driss Pope is a(n) 74 year old male who presents today as a return patient for a full body skin cancer screening. The patient has a history of nonmelanoma skin cancer. The patient was last seen in dermatology 05/08/2024. Today, the patient reports a concern with a spot on his left postauricular area and left cheek . No other cutaneous concerns. Patient reports being diligent with photoprotection.      Physical Exam:  Vitals: There were no vitals taken for this visit.   SKIN: Total skin excluding the genitalia areas was performed. The exam included the scalp, face, neck, bilateral arms, chest, back, abdomen, bilateral legs, digits, mons pubis, buttocks, and nails.   - Oglesby II.   - NUB, right upper back, flesh colored papule with pigment globules on dermoscopy  -NUB, left postauricular area, erythematous papule   -Left cheek, waxy, stuck on tan to brown papules   - Multiple tan/brown macules and papules  "scattered throughout exam, consistent with benign nevi. No concerning features on dermoscopy.   - Scattered tan, homogenous macules scattered on sun exposed skin, consistent with solar lentigines.   - Scattered waxy, stuck on appearing papules and patches, consistent with seborrheic keratoses.  - Several 1-2 mm red dome shaped symmetric papules, consistent with cherry angiomas.           - No other lesions of concern on areas examined.     Medications:  Current Outpatient Medications   Medication Sig Dispense Refill    amLODIPine (NORVASC) 5 MG tablet Take 1 tablet (5 mg) by mouth daily 90 tablet 3    atorvastatin (LIPITOR) 20 MG tablet TAKE 1 TABLET DAILY 90 tablet 3    Krill Oil 1000 MG CAPS Take by mouth daily 750 mg to 1000 mg daily      Misc Natural Products (GLUCOSAMINE CHONDROITIN ADV PO) 2-3 tablets daily \"Move free pills\"      Multiple Vitamins-Minerals (MULTIVITAL PO) Once daily      valsartan (DIOVAN) 320 MG tablet TAKE 1 TABLET DAILY.       (REPLACES LOSARTAN) 90 tablet 3    Vitamin D3 (CHOLECALCIFEROL) 125 MCG (5000 UT) tablet Take 1 tablet by mouth daily       No current facility-administered medications for this visit.      Past Medical History:   Patient Active Problem List   Diagnosis    Stage 3a chronic kidney disease (H)    Benign non-nodular prostatic hyperplasia with lower urinary tract symptoms    Hypertension, goal below 140/90    Class 2 severe obesity due to excess calories with serious comorbidity in adult (H)    Mixed dyslipidemia    IFG (impaired fasting glucose)    History of colonic polyps    CJ (obstructive sleep apnea) - severe (AHI 53)     Past Medical History:   Diagnosis Date    Acute gout 03/29/2019    Chronic kidney disease     Complication of anesthesia     Hyperlipidemia     Hypertension goal BP (blood pressure) < 130/80     Impingement syndrome of both shoulders 03/29/2019    Obesity     CJ (obstructive sleep apnea) - severe (AHI 53) 3/21/2024    Osteoarthritis        CC No " referring provider defined for this encounter. on close of this encounter.

## 2024-07-17 NOTE — PATIENT INSTRUCTIONS
Wound Care After a Biopsy    What is a skin biopsy?  A skin biopsy allows the doctor to examine a very small piece of tissue under the microscope to determine the diagnosis and the best treatment for the skin condition. A local anesthetic (numbing medicine) is injected with a very small needle into the skin area to be tested. A small piece of skin is taken from the area. Sometimes a suture (stitch) is used.     What are the risks of a skin biopsy?  I will experience scar, bleeding, swelling, pain, crusting and redness. I may experience incomplete removal or recurrence. Risks of this procedure are excessive bleeding, bruising, infection, nerve damage, numbness, thick (hypertrophic or keloidal) scar and non-diagnostic biopsy.    How should I care for my wound for the first 24 hours?  Keep the wound dry and covered for 24 hours  If it bleeds, hold direct pressure on the area for 15 minutes. If bleeding does not stop, call us or go to the emergency room  Avoid strenuous exercise the first 1-2 days or as your doctor instructs you    How should I care for the wound after 24 hours?  After 24 hours, remove the bandage  You may bathe or shower as normal  If you had a scalp biopsy, you can shampoo as usual and can use shower water to clean the biopsy site daily  Clean the wound once a day with gentle soap and water  Do not scrub, be gentle  Apply white petroleum/Vaseline after cleaning the wound with a cotton swab or a clean finger, and keep the site covered with a Bandaid /bandage. Bandages are not necessary with a scalp biopsy  If you are unable to cover the site with a Bandaid /bandage, re-apply ointment 2-3 times a day to keep the site moist. Moisture will help with healing  Avoid strenuous activity for first 1-2 days  Avoid lakes, rivers, pools, and oceans until the stitches are removed or the site is healed    How do I clean my wound?  Wash hands thoroughly with soap or use hand  before all wound care  Clean  the wound with gentle soap and water  Apply white petroleum/Vaseline  to wound after it is clean  Replace the Bandaid /bandage to keep the wound covered for the first few days or as instructed by your doctor  If you had a scalp biopsy, warm shower water to the area on a daily basis should suffice    What should I use to clean my wound?   Cotton-tipped applicators (Qtips )  White petroleum jelly (Vaseline ). Use a clean new container and use Q-tips to apply.  Bandaids  as needed  Gentle soap     How should I care for my wound long term?  Do not get your wound dirty  Keep up with wound care for one week or until the area is healed.  A small scab will form and fall off by itself when the area is completely healed. The area will be red and will become pink in color as it heals. Sun protection is very important for how your scar will turn out. Sunscreen with an SPF 30 or greater is recommended once the area is healed.  You should have some soreness but it should be mild and slowly go away over several days. Talk to your doctor about using tylenol for pain,    When should I call my doctor?  If you have increased:   Pain or swelling  Pus or drainage (clear or slightly yellow drainage is ok)  Temperature over 100F  Spreading redness or warmth around wound    When will I hear about my results?  The biopsy results can take 2 weeks to come back.  Your results will automatically release to [facility] before your provider has even reviewed them.  The clinic will call you with the results, send you a [facility] message, or have you schedule a follow-up clinic or phone time to discuss the results.  Contact our clinics if you do not hear from us in 2 weeks.    Who should I call with questions?  [facility]: [phone]  [facility]: [phone]  For urgent needs outside of business hours call the [facility] at [phone] and ask for the dermatology resident on call            The ABCDEs of Melanoma    Skin cancer can develop anywhere on the skin. Ask someone for help when checking your skin, especially in hard to see places. If you notice a mole different from others, or that changes, enlarges, itches, or bleeds (even if it is small), you should see a dermatologist.

## 2024-07-24 ENCOUNTER — OFFICE VISIT (OUTPATIENT)
Dept: DERMATOLOGY | Facility: CLINIC | Age: 74
End: 2024-07-24
Payer: MEDICARE

## 2024-07-24 DIAGNOSIS — D48.5 NEOPLASM OF UNCERTAIN BEHAVIOR OF SKIN: ICD-10-CM

## 2024-07-24 DIAGNOSIS — D18.01 CHERRY ANGIOMA: ICD-10-CM

## 2024-07-24 DIAGNOSIS — L81.4 SOLAR LENTIGINOSIS: ICD-10-CM

## 2024-07-24 DIAGNOSIS — D22.9 MULTIPLE BENIGN NEVI: ICD-10-CM

## 2024-07-24 DIAGNOSIS — L82.1 SEBORRHEIC KERATOSES: ICD-10-CM

## 2024-07-24 DIAGNOSIS — Z85.828 HISTORY OF NONMELANOMA SKIN CANCER: Primary | ICD-10-CM

## 2024-07-24 PROCEDURE — 99213 OFFICE O/P EST LOW 20 MIN: CPT | Mod: 25 | Performed by: STUDENT IN AN ORGANIZED HEALTH CARE EDUCATION/TRAINING PROGRAM

## 2024-07-24 PROCEDURE — 11102 TANGNTL BX SKIN SINGLE LES: CPT | Performed by: STUDENT IN AN ORGANIZED HEALTH CARE EDUCATION/TRAINING PROGRAM

## 2024-07-24 PROCEDURE — 88305 TISSUE EXAM BY PATHOLOGIST: CPT | Performed by: DERMATOLOGY

## 2024-07-24 PROCEDURE — 11103 TANGNTL BX SKIN EA SEP/ADDL: CPT | Performed by: STUDENT IN AN ORGANIZED HEALTH CARE EDUCATION/TRAINING PROGRAM

## 2024-07-24 ASSESSMENT — PAIN SCALES - GENERAL: PAINLEVEL: NO PAIN (0)

## 2024-07-24 NOTE — NURSING NOTE
Driss Pope's chief complaint for this visit includes:  Chief Complaint   Patient presents with    Skin Check     FBSC- white spots on left cheek. Left ear had something that felt like a pimple but nothing came out but blood.   Hx of NMSC.      PCP: Adam Soliz    Referring Provider:  Referred Self, MD  No address on file    There were no vitals taken for this visit.  No Pain (0)        Allergies   Allergen Reactions    Lisinopril Cough     Persistent cough with Lisinopril         Do you need any medication refills at today's visit?  No.      Lian Townsend RN on 7/24/2024 at 1:34 PM

## 2024-07-24 NOTE — LETTER
7/24/2024      Driss Pope  [address]  [facility] [address]      Dear Colleague,    Thank you for referring your patient, Driss Pope, to the [facility]. Please see a copy of my visit note below.      [facility] Dermatology Note  Encounter Date: Jul 24, 2024  Office Visit     Reviewed patients past medical history and pertinent chart review prior to patients visit today.     Dermatology Problem List:  Last skin check: 07/24/2024    0. NUB Right upper back and Left postauricular area, shave biopsy 07/24/24 .    History of nonmelanoma skin cancer  -Basal cell carcinoma, left forehead, s/p bx 3/25/24 , s/p Mohs 5/8/24   -Basal cell carcinoma, left jawline, s/p bx 3/25/24 , s/p Mohs 5/8/24     Family Hx: No family history of skin cancer.   Social Hx: enjoys golfing and teaches ballroom dance  ____________________________________________    Assessment & Plan:     # Neoplasm of uncertain behavior:  right upper back DDx includes BCC  vs SK. Shave biopsy today.  # Neoplasm of uncertain behavior:  left postauricular area  DDx includes inflammatory papule vs NMSC.     Procedure Note: Biopsy by shave technique  The risks and benefits of the procedure were described to the patient. These include but are not limited to bleeding, infection, scar, incomplete removal, and non-diagnostic biopsy. Verbal informed consent was obtained. The above site(s) was cleansed with an alcohol pad and injected with 1% lidocaine with epinephrine. Once anesthesia was obtained, a biopsy(ies) was performed with Gilette blade. The tissue(s) was placed in a labeled container(s) with formalin and sent to pathology. Hemostasis was achieved with aluminum chloride. Vaseline and a bandage were applied to the wound(s). The patient tolerated the procedure well and was given post biopsy care instructions.    # Personal history of nonmelanoma skin cancer  - No signs of recurrence. Continued  observation recommended.   - Sun protection: Counseled SPF 30+ sunscreen, UPF clothing, sun avoidance, tanning bed avoidance.    # Multiple nevi, trunk and extremities  # Solar lentigines  - Nevi demonstrate no concerning features on dermoscopy. We discussed the importance of self exams at home.   - ABCDEs: Counseled ABCDEs of melanoma: Asymmetry, Border (irregularity), Color (not uniform, changes in color), Diameter (greater than 6 mm which is about the size of a pencil eraser), and Evolving (any changes in preexisting moles).    # Cherry angiomas  # Seborrheic keratoses  - We discussed the benign nature of the skin lesions. No treatment required. Continued observation recommended. Follow up with any concerns.        Follow-up:  6 months for follow up full body skin exam, as needed for new or changing lesions or new concerns    All risks, benefits and alternatives were discussed with patient.  Patient is in agreement and understands the assessment and plan.  All questions were answered.  Emelia Chau PA-C  [facility]at[facility]    ____________________________________________    CC: Skin Check (FBSC- white spots on left cheek. Left ear had something that felt like a pimple but nothing came out but blood. /Hx of NMSC. )    HPI:  Mr. Driss Pope is a(n) 74 year old male who presents today as a return patient for a full body skin cancer screening. The patient has a history of nonmelanoma skin cancer. The patient was last seen in dermatology 05/08/2024. Today, the patient reports a concern with a spot on his left postauricular area and left cheek . No other cutaneous concerns. Patient reports being diligent with photoprotection.      Physical Exam:  Vitals: There were no vitals taken for this visit.   SKIN: Total skin excluding the genitalia areas was performed. The exam included the scalp, face, neck, bilateral arms, chest, back, abdomen, bilateral legs, digits, mons pubis, buttocks, and nails.   -  "Oglesby II.   - NUB, right upper back, flesh colored papule with pigment globules on dermoscopy  -NUB, left postauricular area, erythematous papule   -Left cheek, waxy, stuck on tan to brown papules   - Multiple tan/brown macules and papules scattered throughout exam, consistent with benign nevi. No concerning features on dermoscopy.   - Scattered tan, homogenous macules scattered on sun exposed skin, consistent with solar lentigines.   - Scattered waxy, stuck on appearing papules and patches, consistent with seborrheic keratoses.  - Several 1-2 mm red dome shaped symmetric papules, consistent with cherry angiomas.           - No other lesions of concern on areas examined.     Medications:  Current Outpatient Medications   Medication Sig Dispense Refill     amLODIPine (NORVASC) 5 MG tablet Take 1 tablet (5 mg) by mouth daily 90 tablet 3     atorvastatin (LIPITOR) 20 MG tablet TAKE 1 TABLET DAILY 90 tablet 3     Krill Oil 1000 MG CAPS Take by mouth daily 750 mg to 1000 mg daily       Misc Natural Products (GLUCOSAMINE CHONDROITIN ADV PO) 2-3 tablets daily \"Move free pills\"       Multiple Vitamins-Minerals (MULTIVITAL PO) Once daily       valsartan (DIOVAN) 320 MG tablet TAKE 1 TABLET DAILY.       (REPLACES LOSARTAN) 90 tablet 3     Vitamin D3 (CHOLECALCIFEROL) 125 MCG (5000 UT) tablet Take 1 tablet by mouth daily       No current facility-administered medications for this visit.      Past Medical History:   Patient Active Problem List   Diagnosis     Stage 3a chronic kidney disease (H)     Benign non-nodular prostatic hyperplasia with lower urinary tract symptoms     Hypertension, goal below 140/90     Class 2 severe obesity due to excess calories with serious comorbidity in adult (H)     Mixed dyslipidemia     IFG (impaired fasting glucose)     History of colonic polyps     CJ (obstructive sleep apnea) - severe (AHI 53)     Past Medical History:   Diagnosis Date     Acute gout 03/29/2019     Chronic kidney " disease      Complication of anesthesia      Hyperlipidemia      Hypertension goal BP (blood pressure) < 130/80      Impingement syndrome of both shoulders 03/29/2019     Obesity      CJ (obstructive sleep apnea) - severe (AHI 53) 3/21/2024     Osteoarthritis        CC No referring provider defined for this encounter. on close of this encounter.      Again, thank you for allowing me to participate in the care of your patient.        Sincerely,        Emelia Chau PA-C

## 2024-07-28 LAB
PATH REPORT.COMMENTS IMP SPEC: NORMAL
PATH REPORT.COMMENTS IMP SPEC: NORMAL
PATH REPORT.FINAL DX SPEC: NORMAL
PATH REPORT.GROSS SPEC: NORMAL
PATH REPORT.MICROSCOPIC SPEC OTHER STN: NORMAL
PATH REPORT.RELEVANT HX SPEC: NORMAL

## 2024-08-03 SDOH — HEALTH STABILITY: PHYSICAL HEALTH: ON AVERAGE, HOW MANY MINUTES DO YOU ENGAGE IN EXERCISE AT THIS LEVEL?: 120 MIN

## 2024-08-03 SDOH — HEALTH STABILITY: PHYSICAL HEALTH: ON AVERAGE, HOW MANY DAYS PER WEEK DO YOU ENGAGE IN MODERATE TO STRENUOUS EXERCISE (LIKE A BRISK WALK)?: 3 DAYS

## 2024-08-03 ASSESSMENT — SOCIAL DETERMINANTS OF HEALTH (SDOH): HOW OFTEN DO YOU GET TOGETHER WITH FRIENDS OR RELATIVES?: THREE TIMES A WEEK

## 2024-08-06 ENCOUNTER — OFFICE VISIT (OUTPATIENT)
Dept: FAMILY MEDICINE | Facility: CLINIC | Age: 74
End: 2024-08-06
Payer: MEDICARE

## 2024-08-06 VITALS
TEMPERATURE: 97.4 F | DIASTOLIC BLOOD PRESSURE: 88 MMHG | SYSTOLIC BLOOD PRESSURE: 135 MMHG | BODY MASS INDEX: 37.89 KG/M2 | RESPIRATION RATE: 15 BRPM | HEART RATE: 79 BPM | OXYGEN SATURATION: 91 % | HEIGHT: 68 IN | WEIGHT: 250 LBS

## 2024-08-06 DIAGNOSIS — E66.01 CLASS 2 SEVERE OBESITY DUE TO EXCESS CALORIES WITH SERIOUS COMORBIDITY AND BODY MASS INDEX (BMI) OF 38.0 TO 38.9 IN ADULT (H): Chronic | ICD-10-CM

## 2024-08-06 DIAGNOSIS — R73.01 IFG (IMPAIRED FASTING GLUCOSE): ICD-10-CM

## 2024-08-06 DIAGNOSIS — I10 HYPERTENSION, GOAL BELOW 140/90: Chronic | ICD-10-CM

## 2024-08-06 DIAGNOSIS — G47.33 OSA (OBSTRUCTIVE SLEEP APNEA): Chronic | ICD-10-CM

## 2024-08-06 DIAGNOSIS — E66.812 CLASS 2 SEVERE OBESITY DUE TO EXCESS CALORIES WITH SERIOUS COMORBIDITY AND BODY MASS INDEX (BMI) OF 38.0 TO 38.9 IN ADULT (H): Chronic | ICD-10-CM

## 2024-08-06 DIAGNOSIS — Z00.00 MEDICARE ANNUAL WELLNESS VISIT, SUBSEQUENT: Primary | ICD-10-CM

## 2024-08-06 DIAGNOSIS — N18.31 STAGE 3A CHRONIC KIDNEY DISEASE (H): ICD-10-CM

## 2024-08-06 DIAGNOSIS — E78.2 MIXED DYSLIPIDEMIA: Chronic | ICD-10-CM

## 2024-08-06 DIAGNOSIS — Z29.11 NEED FOR VACCINATION AGAINST RESPIRATORY SYNCYTIAL VIRUS: ICD-10-CM

## 2024-08-06 LAB
ALBUMIN SERPL BCG-MCNC: 4.4 G/DL (ref 3.5–5.2)
ALP SERPL-CCNC: 81 U/L (ref 40–150)
ALT SERPL W P-5'-P-CCNC: 28 U/L (ref 0–70)
ANION GAP SERPL CALCULATED.3IONS-SCNC: 10 MMOL/L (ref 7–15)
AST SERPL W P-5'-P-CCNC: 24 U/L (ref 0–45)
BILIRUB SERPL-MCNC: 0.4 MG/DL
BUN SERPL-MCNC: 24.3 MG/DL (ref 8–23)
CALCIUM SERPL-MCNC: 9.7 MG/DL (ref 8.8–10.4)
CHLORIDE SERPL-SCNC: 106 MMOL/L (ref 98–107)
CHOLEST SERPL-MCNC: 178 MG/DL
CREAT SERPL-MCNC: 1.32 MG/DL (ref 0.67–1.17)
CREAT UR-MCNC: 153 MG/DL
EGFRCR SERPLBLD CKD-EPI 2021: 56 ML/MIN/1.73M2
ERYTHROCYTE [DISTWIDTH] IN BLOOD BY AUTOMATED COUNT: 13.3 % (ref 10–15)
FASTING STATUS PATIENT QL REPORTED: YES
FASTING STATUS PATIENT QL REPORTED: YES
GLUCOSE SERPL-MCNC: 108 MG/DL (ref 70–99)
HBA1C MFR BLD: 5.8 % (ref 0–5.6)
HCO3 SERPL-SCNC: 27 MMOL/L (ref 22–29)
HCT VFR BLD AUTO: 43.3 % (ref 40–53)
HDLC SERPL-MCNC: 48 MG/DL
HGB BLD-MCNC: 14 G/DL (ref 13.3–17.7)
LDLC SERPL CALC-MCNC: 95 MG/DL
MCH RBC QN AUTO: 28.5 PG (ref 26.5–33)
MCHC RBC AUTO-ENTMCNC: 32.3 G/DL (ref 31.5–36.5)
MCV RBC AUTO: 88 FL (ref 78–100)
MICROALBUMIN UR-MCNC: 16.6 MG/L
MICROALBUMIN/CREAT UR: 10.85 MG/G CR (ref 0–17)
NONHDLC SERPL-MCNC: 130 MG/DL
PLATELET # BLD AUTO: 209 10E3/UL (ref 150–450)
POTASSIUM SERPL-SCNC: 4.5 MMOL/L (ref 3.4–5.3)
PROT SERPL-MCNC: 7 G/DL (ref 6.4–8.3)
RBC # BLD AUTO: 4.91 10E6/UL (ref 4.4–5.9)
SODIUM SERPL-SCNC: 143 MMOL/L (ref 135–145)
TRIGL SERPL-MCNC: 177 MG/DL
WBC # BLD AUTO: 7.8 10E3/UL (ref 4–11)

## 2024-08-06 PROCEDURE — 83036 HEMOGLOBIN GLYCOSYLATED A1C: CPT | Performed by: INTERNAL MEDICINE

## 2024-08-06 PROCEDURE — 80053 COMPREHEN METABOLIC PANEL: CPT | Performed by: INTERNAL MEDICINE

## 2024-08-06 PROCEDURE — G0439 PPPS, SUBSEQ VISIT: HCPCS | Performed by: INTERNAL MEDICINE

## 2024-08-06 PROCEDURE — 82570 ASSAY OF URINE CREATININE: CPT | Performed by: INTERNAL MEDICINE

## 2024-08-06 PROCEDURE — 85027 COMPLETE CBC AUTOMATED: CPT | Performed by: INTERNAL MEDICINE

## 2024-08-06 PROCEDURE — 36415 COLL VENOUS BLD VENIPUNCTURE: CPT | Performed by: INTERNAL MEDICINE

## 2024-08-06 PROCEDURE — 99214 OFFICE O/P EST MOD 30 MIN: CPT | Mod: 25 | Performed by: INTERNAL MEDICINE

## 2024-08-06 PROCEDURE — 80061 LIPID PANEL: CPT | Performed by: INTERNAL MEDICINE

## 2024-08-06 PROCEDURE — 82043 UR ALBUMIN QUANTITATIVE: CPT | Performed by: INTERNAL MEDICINE

## 2024-08-06 ASSESSMENT — PAIN SCALES - GENERAL: PAINLEVEL: NO PAIN (0)

## 2024-08-06 NOTE — PROGRESS NOTES
"Preventive Care Visit  [facility]  Adam Soliz MD, Internal Medicine  Aug 6, 2024      Assessment & Plan     1.  Medicare annual wellness visit completed.  Overall good.  2.  Essential hypertension with blood pressure under control with amlodipine and valsartan.  Continue same.  Will check labs today.  3.  Mixed dyslipidemia being treated with atorvastatin 20 mg.  Will check lipids today.  4.  Stage IIIa chronic kidney disease.  Renal functions will be checked today.  Based on the results we will recommend SGLT2 inhibitor for prevention of progression of chronic kidney disease.  5.  Impaired fasting glucose.  I will be checking A1c.  6.  Obstructive sleep apnea.  Severe sleep apnea has been diagnosed.  He is going to be getting his CPAP today and starting treatment.  7.  Class II obesity with a BMI of 38.5.  8.  Need for vaccination against RSV.  Recommended.  Patient will consider it this fall along with flu shot.      Will check CBC CMP, A1c, lipids and urine microalbumin today.  I will get back to with results and recommendation.    Patient has been advised of split billing requirements and indicates understanding: Yes        BMI  Estimated body mass index is 38.58 kg/m  as calculated from the following:    Height as of this encounter: 1.715 m (5' 7.5\").    Weight as of this encounter: 113.4 kg (250 lb).   Weight management plan: Discussed healthy diet and exercise guidelines    Counseling  Appropriate preventive services were addressed with this patient via screening, questionnaire, or discussion as appropriate for fall prevention, nutrition, physical activity, Tobacco-use cessation, weight loss and cognition.  Checklist reviewing preventive services available has been given to the patient.  Reviewed patient's diet, addressing concerns and/or questions.   He is at risk for lack of exercise and has been provided with information to increase physical activity for the benefit of his " well-being.   The patient was provided with written information regarding signs of hearing loss.         Subjective   Gary is a 74 year old, presenting for the following:  Physical        8/6/2024     7:52 AM   Additional Questions   Roomed by Minoo   Accompanied by self         8/6/2024     7:52 AM   Patient Reported Additional Medications   Patient reports taking the following new medications no         Health Care Directive  Patient does not have a Health Care Directive or Living Will: Discussed advance care planning with patient; however, patient declined at this time.    HPI    Gary has come in for annual physical examination and also follow-up on hypertension, hyperlipidemia, impaired fasting glucose, obstructive sleep apnea and chronic kidney disease.  Overall he has been doing okay.  He underwent evaluation for sleep apnea and was found to have severe disease with nighttime hypoxia.  He is getting his CPAP machine today and will start treatment.  Otherwise he has been doing okay.  Recently came back from a visit to [place] where he caught a viral upper respiratory infection.  He has a dry cough for the past 3 weeks that is getting little better.  No dyspnea.  Takes all his medication as prescribed.          8/3/2024   General Health   How would you rate your overall physical health? Good   Feel stress (tense, anxious, or unable to sleep) Not at all            8/3/2024   Nutrition   Diet: Regular (no restrictions)            8/3/2024   Exercise   Days per week of moderate/strenous exercise 3 days   Average minutes spent exercising at this level 120 min            8/3/2024   Social Factors   Frequency of gathering with friends or relatives Three times a week   Worry food won't last until get money to buy more No   Food not last or not have enough money for food? No   Do you have housing? (Housing is defined as stable permanent housing and does not include staying ouside in a car, in a tent, in an abandoned  building, in an overnight shelter, or couch-surfing.) Yes   Are you worried about losing your housing? No   Lack of transportation? No   Unable to get utilities (heat,electricity)? No            8/3/2024   Fall Risk   Fallen 2 or more times in the past year? No    No   Trouble with walking or balance? No    No       Multiple values from one day are sorted in reverse-chronological order          8/3/2024   Activities of Daily Living- Home Safety   Needs help with the following daily activites None of the above   Safety concerns in the home None of the above            8/3/2024   Dental   Dentist two times every year? Yes            8/3/2024   Hearing Screening   Hearing concerns? (!) I NEED TO ASK PEOPLE TO SPEAK UP OR REPEAT THEMSELVES.    (!) IT'S HARD TO FOLLOW A CONVERSATION IN A NOISY RESTAURANT OR CROWDED ROOM.    (!) TROUBLE UNDERSTANDING SOFT OR WHISPERED SPEECH.       Multiple values from one day are sorted in reverse-chronological order         8/3/2024   Driving Risk Screening   Patient/family members have concerns about driving No            8/3/2024   General Alertness/Fatigue Screening   Have you been more tired than usual lately? No            8/3/2024   Urinary Incontinence Screening   Bothered by leaking urine in past 6 months No            8/3/2024   TB Screening   Were you born outside of the [place]? No            Today's PHQ-2 Score:       8/6/2024     7:46 AM   PHQ-2 ( 1999 [employer])   Q1: Little interest or pleasure in doing things 0   Q2: Feeling down, depressed or hopeless 0   PHQ-2 Score 0   Q1: Little interest or pleasure in doing things Not at all   Q2: Feeling down, depressed or hopeless Not at all   PHQ-2 Score 0           8/3/2024   Substance Use   Alcohol more than 3/day or more than 7/wk No   Do you have a current opioid prescription? No   How severe/bad is pain from 1 to 10? 1/10   Do you use any other substances recreationally? No        Social History     Tobacco Use    Smoking status:  Never     Passive exposure: Never    Smokeless tobacco: Never   Vaping Use    Vaping status: Never Used   Substance Use Topics    Alcohol use: Yes     Comment: occasionally    Drug use: No           8/3/2024   AAA Screening   Family history of Abdominal Aortic Aneurysm (AAA)? No      ASCVD Risk   The 10-year ASCVD risk score (John DESAI, et al., 2019) is: 27.5%    Values used to calculate the score:      Age: 74 years      Sex: Male      Is Non- : No      Diabetic: No      Tobacco smoker: No      Systolic Blood Pressure: 135 mmHg      Is BP treated: Yes      HDL Cholesterol: 59 mg/dL      Total Cholesterol: 162 mg/dL    Fracture Risk Assessment Tool  Link to Frax Calculator  Use the information below to complete the Frax calculator  : 1950  Sex: male  Weight (kg): 113.4 kg (actual weight)  Height (cm): 171.5 cm  Previous Fragility Fracture:  No  History of parent with fractured hip:  No  Current Smoking:  No  Patient has been on glucocorticoids for more than 3 months (5mg/day or more): No  Rheumatoid Arthritis on Problem List:  No  Secondary Osteoporosis on Problem List:  No  Consumes 3 or more units of alcohol per day: No  Femoral Neck BMD (g/cm2)            Reviewed and updated as needed this visit by Provider                    Past Medical History:   Diagnosis Date    Acute gout 2019    Chronic kidney disease     Complication of anesthesia     Hyperlipidemia     Hypertension goal BP (blood pressure) < 130/80     Impingement syndrome of both shoulders 2019    Obesity     CJ (obstructive sleep apnea) - severe (AHI 53) 3/21/2024    Osteoarthritis      Past Surgical History:   Procedure Laterality Date    ARTHROSCOPY KNEE RT/LT      Left knee    COLONOSCOPY  10/04/2011    Procedure:COLONOSCOPY; Colonoscopy, screening; Surgeon:HANNAH COMER; [facility]    COLONOSCOPY  10/04/2011    Procedure:COMBINED COLONOSCOPY, REMOVE TUMOR/POLYP/LESION BY SNARE;  Surgeon:HANNAH COMER; [facility]    COLONOSCOPY N/A 12/09/2016    Procedure: COMBINED COLONOSCOPY, SINGLE OR MULTIPLE BIOPSY/POLYPECTOMY BY BIOPSY;  Surgeon: Duane, William Charles, MD;  [facility]    COLONOSCOPY N/A 08/08/2023    Procedure: COLONOSCOPY, WITH POLYPECTOMY AND BIOPSY;  Surgeon: Joann Carver DO;  [facility]    COLONOSCOPY N/A 08/08/2023    Procedure: COLONOSCOPY, FLEXIBLE, WITH LESION REMOVAL USING SNARE;  Surgeon: Joann Carver DO;  [facility]    COLONOSCOPY WITH CO2 INSUFFLATION N/A 12/09/2016    Procedure: COLONOSCOPY WITH CO2 INSUFFLATION;  Surgeon: Duane, William Charles, MD;  [facility]    COLONOSCOPY WITH CO2 INSUFFLATION N/A 03/15/2017    Procedure: COLONOSCOPY WITH CO2 INSUFFLATION;  Surgeon: Duane, William Charles, MD;  [facility]    COLONOSCOPY WITH CO2 INSUFFLATION N/A 08/08/2023    Procedure: Colonoscopy with CO2 insufflation;  Surgeon: Joann Carver DO;  [facility]    CYSTOSCOPY N/A 10/02/2019    Procedure: CYSTOSCOPY;  Surgeon: Oscar Resendiz MD;  Location:  OR    EYE SURGERY  2017 2017    JOINT REPLACEMENT, HIP RT/LT      Joint Replacement Hip RT/LT-both replced in the last 5 years    LASER KTP GREEN LIGHT PHOTOSELECTIVE VAPORIZATION PROSTATE N/A 10/02/2019    Procedure: CYSTOSCOPY, VAPORIZATION, PROSTATE, PHOTOSELECTIVE, USING [address] 80 WATT KTP LASER;  Surgeon: Oscar Resendiz MD;  Location:  OR    TONSILLECTOMY       BP Readings from Last 3 Encounters:   08/06/24 135/88   05/08/24 129/83   03/25/24 (!) 142/82    Wt Readings from Last 3 Encounters:   08/06/24 113.4 kg (250 lb)   03/25/24 117.6 kg (259 lb 3.2 oz)   12/28/23 116.9 kg (257 lb 11.2 oz)                  Patient Active Problem List   Diagnosis    Stage 3a chronic kidney disease (H)    Benign non-nodular prostatic hyperplasia with lower urinary tract symptoms    Hypertension, goal below 140/90    Class 2 severe obesity due to excess calories with serious  comorbidity in adult (H)    Mixed dyslipidemia    IFG (impaired fasting glucose)    History of colonic polyps    CJ (obstructive sleep apnea) - severe (AHI 53)     Past Surgical History:   Procedure Laterality Date    ARTHROSCOPY KNEE RT/LT      Left knee    COLONOSCOPY  10/04/2011    Procedure:COLONOSCOPY; Colonoscopy, screening; Surgeon:HANNAH COMER; [facility]    COLONOSCOPY  10/04/2011    Procedure:COMBINED COLONOSCOPY, REMOVE TUMOR/POLYP/LESION BY SNARE; Surgeon:HANNAH COMER; [facility]    COLONOSCOPY N/A 12/09/2016    Procedure: COMBINED COLONOSCOPY, SINGLE OR MULTIPLE BIOPSY/POLYPECTOMY BY BIOPSY;  Surgeon: Duane, William Charles, MD;  [facility]    COLONOSCOPY N/A 08/08/2023    Procedure: COLONOSCOPY, WITH POLYPECTOMY AND BIOPSY;  Surgeon: Joann Carver DO;  [facility]    COLONOSCOPY N/A 08/08/2023    Procedure: COLONOSCOPY, FLEXIBLE, WITH LESION REMOVAL USING SNARE;  Surgeon: Joann Carver DO;  [facility]    COLONOSCOPY WITH CO2 INSUFFLATION N/A 12/09/2016    Procedure: COLONOSCOPY WITH CO2 INSUFFLATION;  Surgeon: Duane, William Charles, MD;  [facility]    COLONOSCOPY WITH CO2 INSUFFLATION N/A 03/15/2017    Procedure: COLONOSCOPY WITH CO2 INSUFFLATION;  Surgeon: Duane, William Charles, MD;  [facility]    COLONOSCOPY WITH CO2 INSUFFLATION N/A 08/08/2023    Procedure: Colonoscopy with CO2 insufflation;  Surgeon: Joann Carver DO;  [facility]    CYSTOSCOPY N/A 10/02/2019    Procedure: CYSTOSCOPY;  Surgeon: Oscar Resendiz MD;  Location:  OR    EYE SURGERY  2017 2017    JOINT REPLACEMENT, HIP RT/LT      Joint Replacement Hip RT/LT-both replced in the last 5 years    LASER KTP GREEN LIGHT PHOTOSELECTIVE VAPORIZATION PROSTATE N/A 10/02/2019    Procedure: CYSTOSCOPY, VAPORIZATION, PROSTATE, PHOTOSELECTIVE, USING [address] 80 WATT KTP LASER;  Surgeon: Oscar Resendiz MD;  Location:  OR    TONSILLECTOMY         Social History     Tobacco Use     Smoking status: Never     Passive exposure: Never    Smokeless tobacco: Never   Substance Use Topics    Alcohol use: Yes     Comment: occasionally     Family History   Problem Relation Age of Onset    Alzheimer Disease Mother     Hypertension Mother     Heart Disease Father         CHF    Alzheimer Disease Paternal Grandmother     Alzheimer Disease Paternal Grandfather     Cancer Brother         esophageal cancer    Prostate Cancer Brother     Diabetes Brother     Prostate Cancer Brother     Diabetes Sister     Unknown/Adopted Son         adopted    Asthma No family hx of     C.A.D. No family hx of     Cerebrovascular Disease No family hx of     Breast Cancer No family hx of     Cancer - colorectal No family hx of     Alcohol/Drug No family hx of     Allergies No family hx of     Anesthesia Reaction No family hx of     Arthritis No family hx of     Blood Disease No family hx of     Cardiovascular No family hx of     Circulatory No family hx of     Congenital Anomalies No family hx of     Connective Tissue Disorder No family hx of     Depression No family hx of     Endocrine Disease No family hx of     Genetic Disorder No family hx of     Eye Disorder No family hx of     Gastrointestinal Disease No family hx of     Genitourinary Problems No family hx of     Gynecology No family hx of     Lipids No family hx of     Musculoskeletal Disorder No family hx of     Neurologic Disorder No family hx of     Obesity No family hx of     Osteoporosis No family hx of     Psychotic Disorder No family hx of     Respiratory No family hx of     Thyroid Disease No family hx of     Family History Negative No family hx of     Hearing Loss No family hx of     Substance Abuse No family hx of     Mental Illness No family hx of     Anxiety Disorder No family hx of     Hyperlipidemia No family hx of     Coronary Artery Disease No family hx of          Current Outpatient Medications   Medication Sig Dispense Refill    amLODIPine (NORVASC) 5  "MG tablet Take 1 tablet (5 mg) by mouth daily 90 tablet 3    atorvastatin (LIPITOR) 20 MG tablet TAKE 1 TABLET DAILY 90 tablet 3    Krill Oil 1000 MG CAPS Take by mouth daily 750 mg to 1000 mg daily      Misc Natural Products (GLUCOSAMINE CHONDROITIN ADV PO) 2-3 tablets daily \"Move free pills\"      Multiple Vitamins-Minerals (MULTIVITAL PO) Once daily      valsartan (DIOVAN) 320 MG tablet TAKE 1 TABLET DAILY.       (REPLACES LOSARTAN) 90 tablet 3    Vitamin D3 (CHOLECALCIFEROL) 125 MCG (5000 UT) tablet Take 1 tablet by mouth daily       Allergies   Allergen Reactions    Lisinopril Cough     Persistent cough with Lisinopril     Recent Labs   Lab Test 06/08/23  0806 06/02/22  0926 04/26/22  0822 07/30/21  1046 07/30/21  1046 06/19/20  0813 10/08/19  0856 10/09/18  0902 03/26/18  0906 10/10/17  0907 01/02/17  0910   A1C 5.9*  --  5.6  --   --   --   --   --  5.4  --   --    LDL 78  --  65  --   --  83  --    < > 72  --  92   HDL 59  --  51  --   --  50  --    < > 50  --  67   TRIG 123  --  208*  --   --  169*  --    < > 212*  --  125   ALT  --   --   --   --  36  --   --   --  24  --  36   CR 1.35* 1.23 1.13   < > 1.26* 1.15 1.21   < > 1.36*   < > 1.51*   GFRESTIMATED 55* 62 69   < > 57* 64 60*   < > 52*   < > 46*   GFRESTBLACK  --   --   --   --   --  74 70   < > 63   < > 56*   POTASSIUM 4.2 4.5 3.9   < > 3.9 4.2 4.3   < > 4.3   < > 4.5    < > = values in this interval not displayed.      Current providers sharing in care for this patient include:  Patient Care Team:  Adam Soliz MD as PCP - General (Internal Medicine)  Adam Soliz MD as Assigned PCP  Carol Malone [facility] as Pharmacist (Pharmacist)  Susana Zuleta MD as MD (Family Medicine)  Emelia Chau PA-C (Dermatology)  Fredo Guerrero MD as Assigned Surgical Provider    The following health maintenance items are reviewed in Epic and correct as of today:  Health Maintenance   Topic Date Due    RSV VACCINE (Pregnancy & 60+) (1 - " "1-dose 60+ series) Never done    MEDICARE ANNUAL WELLNESS VISIT  06/06/2024    ANNUAL REVIEW OF HM ORDERS  06/06/2024    BMP  06/08/2024    LIPID  06/08/2024    MICROALBUMIN  06/08/2024    HEMOGLOBIN  06/08/2024    INFLUENZA VACCINE (1) 09/01/2024    FALL RISK ASSESSMENT  08/06/2025    GLUCOSE  06/08/2026    COLORECTAL CANCER SCREENING  08/08/2026    ADVANCE CARE PLANNING  06/06/2028    DTAP/TDAP/TD IMMUNIZATION (3 - Td or Tdap) 06/06/2033    PHQ-2 (once per calendar year)  Completed    Pneumococcal Vaccine: 65+ Years  Completed    URINALYSIS  Completed    ZOSTER IMMUNIZATION  Completed    HEPATITIS C SCREENING  Addressed    IPV IMMUNIZATION  Aged Out    HPV IMMUNIZATION  Aged Out    MENINGITIS IMMUNIZATION  Aged Out    RSV MONOCLONAL ANTIBODY  Aged Out    COVID-19 Vaccine  Discontinued         Review of Systems  Constitutional, HEENT, cardiovascular, pulmonary, GI, , musculoskeletal, neuro, skin, endocrine and psych systems are negative, except as otherwise noted.     Objective    Exam  /88 (BP Location: Right arm, Patient Position: Sitting, Cuff Size: Adult Large)   Pulse 79   Temp 97.4  F (36.3  C) (Temporal)   Resp 15   Ht 1.715 m (5' 7.5\")   Wt 113.4 kg (250 lb)   SpO2 91%   BMI 38.58 kg/m     Estimated body mass index is 38.58 kg/m  as calculated from the following:    Height as of this encounter: 1.715 m (5' 7.5\").    Weight as of this encounter: 113.4 kg (250 lb).    Physical Exam  GENERAL: alert and no distress  EYES: Eyes grossly normal to inspection, PERRL and conjunctivae and sclerae normal  HENT: ear canals and TM's normal, nose and mouth without ulcers or lesions  NECK: no adenopathy, no asymmetry, masses, or scars  RESP: lungs clear to auscultation - no rales, rhonchi or wheezes  CV: regular rate and rhythm, normal S1 S2, no S3 or S4, no murmur, click or rub, no peripheral edema  ABDOMEN: soft, nontender, no hepatosplenomegaly, no masses and bowel sounds normal   (male): normal male " genitalia without lesions or urethral discharge, no hernia  RECTAL: normal sphincter tone, no rectal masses, prostate normal size, smooth, nontender without nodules or masses  MS: no gross musculoskeletal defects noted, no edema  SKIN: no suspicious lesions or rashes  NEURO: Normal strength and tone, mentation intact and speech normal  PSYCH: mentation appears normal, affect normal/bright  LYMPH: no cervical, supraclavicular, axillary, or inguinal adenopathy  Diabetic foot exam: normal DP and PT pulses, no trophic changes or ulcerative lesions, and normal sensory exam        8/6/2024   Mini Cog   Clock Draw Score 2 Normal   3 Item Recall 3 objects recalled   Mini Cog Total Score 5                 Signed Electronically by: Adam Soliz MD

## 2024-08-08 ENCOUNTER — OFFICE VISIT (OUTPATIENT)
Dept: DERMATOLOGY | Facility: CLINIC | Age: 74
End: 2024-08-08
Payer: MEDICARE

## 2024-08-08 ENCOUNTER — TELEPHONE (OUTPATIENT)
Dept: FAMILY MEDICINE | Facility: CLINIC | Age: 74
End: 2024-08-08

## 2024-08-08 DIAGNOSIS — N18.31 STAGE 3A CHRONIC KIDNEY DISEASE (H): Primary | Chronic | ICD-10-CM

## 2024-08-08 DIAGNOSIS — Z85.828 HISTORY OF BASAL CELL CARCINOMA OF SKIN: Primary | ICD-10-CM

## 2024-08-08 DIAGNOSIS — Z51.89 VISIT FOR WOUND CHECK: ICD-10-CM

## 2024-08-08 DIAGNOSIS — N18.31 STAGE 3A CHRONIC KIDNEY DISEASE (H): Primary | ICD-10-CM

## 2024-08-08 PROCEDURE — 99212 OFFICE O/P EST SF 10 MIN: CPT | Mod: GC | Performed by: DERMATOLOGY

## 2024-08-08 RX ORDER — DAPAGLIFLOZIN 10 MG/1
10 TABLET, FILM COATED ORAL DAILY
Qty: 90 TABLET | Refills: 3 | Status: SHIPPED | OUTPATIENT
Start: 2024-08-08 | End: 2024-08-08

## 2024-08-08 NOTE — PROGRESS NOTES
Dermatologic Surgery Post-Op Scar Check     CC: 3 month scar evaluation (post MOHs)    Dermatology Problem List:  History of nonmelanoma skin cancer  -Basal cell carcinoma, left forehead, s/p bx 3/25/24 , s/p Mohs 5/8/24   -Basal cell carcinoma, left jawline, s/p bx 3/25/24 , s/p Mohs 5/8/24     Subjective: Driss Pope is a 74 year old male who presents today for scar evaluation after 3 months.   - No complaints, satisfied with cosmetic result  - no other concerns today    Objective: An exam of the left cheek and forehead was performed today   - well-healed scar on the with slight elevation and erythema    Assessment and Plan:     1. Patient if he does not like the redness on the cheek, he can contact us for PDL laser.   - Surgical site healed well healed and no signs of infection.  - The patient should follow up with dermatologic surgery PRN, as well as continue with regular skin exams in general dermatology clinic.    Patient was discussed with and evaluated by attending physician Dr. Guerrero.    Dr Valenzuela  MDSO fellow, PGY5    Staff Physician Comments:   I saw the photos and evaluated the patient with the fellow (Dr. Derek Valenzuela) and I agree with the assessment and plan. I was immediately available in the clinic throughout the encounter.     Fredo Guerrero DO    Department of Dermatology  [facility]: Phone: [phone], Fax:[phone]  [facility]: Phone: [phone], Fax: [phone]

## 2024-08-08 NOTE — NURSING NOTE
Driss Pope's goals for this visit include:   Chief Complaint   Patient presents with    Scar Management       He requests these members of his care team be copied on today's visit information: n/a    PCP: Adam Soliz    Referring Provider:  Referred Self, MD  No address on file    There were no vitals taken for this visit.    Do you need any medication refills at today's visit? No  Kristan Thompson RN

## 2024-08-08 NOTE — TELEPHONE ENCOUNTER
Prior Authorization Retail Medication Request    Medication/Dose: Dapagliflozin 10 mg tablets daily  Diagnosis and ICD code (if different than what is on RX):  see chart  New/renewal/insurance change PA/secondary ins. PA:  Previously Tried and Failed:  see chart  Rationale:  see chart    Insurance   Primary: 2024 [employer] PDP Retail ([employer])   [employer]  Insurance ID:  [id number]  Group ID [id number]    Secondary (if applicable):  Insurance ID:      Pharmacy Information (if different than what is on RX)  Name:  [facility] [address] [place] [address]  Phone:  [phone]  Fax:[phone]

## 2024-08-08 NOTE — CONFIDENTIAL NOTE
Received note from [facility] stating that prescription for dapagliflozin (FARXIGA) 10 MG TABS tablet is not covered by patients plan.  The preferred alternative is [facility].  Please call/fax the pharmacy to change medication along with strength, directions, quantity, and refills.      Thank you!  Sameera

## 2024-08-08 NOTE — LETTER
8/8/2024      Driss Pope  [address]  [place] [address]      Dear Colleague,    Thank you for referring your patient, Driss Pope, to the [facility]. Please see a copy of my visit note below.    Dermatologic Surgery Post-Op Scar Check     CC: 3 month scar evaluation (post MOHs)    Dermatology Problem List:  History of nonmelanoma skin cancer  -Basal cell carcinoma, left forehead, s/p bx 3/25/24 , s/p Mohs 5/8/24   -Basal cell carcinoma, left jawline, s/p bx 3/25/24 , s/p Mohs 5/8/24     Subjective: Driss Pope is a 74 year old male who presents today for scar evaluation after 3 months.   - No complaints, satisfied with cosmetic result  - no other concerns today    Objective: An exam of the left cheek and forehead was performed today   - well-healed scar on the with slight elevation and erythema    Assessment and Plan:     1. Patient if he does not like the redness on the cheek, he can contact us for PDL laser.   - Surgical site healed well healed and no signs of infection.  - The patient should follow up with dermatologic surgery PRN, as well as continue with regular skin exams in general dermatology clinic.    Patient was discussed with and evaluated by attending physician Dr. Guerrero.    Dr Valenzuela  MDSO fellow, PGY5    Staff Physician Comments:   I saw the photos and evaluated the patient with the fellow (Dr. Derek Valenzuela) and I agree with the assessment and plan. I was immediately available in the clinic throughout the encounter.     Fredo Guerrero DO    Department of Dermatology  [facility]: Phone: [phone], Fax:[phone]  [facility]: Phone: [phone], Fax: [phone]            Again, thank you for allowing me to participate in the care of your patient.        Sincerely,        Fredo Guerrero MD

## 2024-08-08 NOTE — TELEPHONE ENCOUNTER
Pharmacy called and informed us that he is insurance will not cover the blood glucose and 10 mg for chronic kidney disease.  Preferred drug is Jardiance or Invokana..  A prescription of empagliflozin 10 mg sent to the pharmacy.

## 2024-08-09 NOTE — TELEPHONE ENCOUNTER
Retail Pharmacy Prior Authorization Team   Phone: [phone]    PA Initiation    Medication: FARXIGA 10 MG PO TABS  Insurance Company: [employer] - Phone [phone] Fax [phone]  Pharmacy Filling the Rx: [id number] [place] [id number] [place] [id number]  Filling Pharmacy Phone: [phone]  Filling Pharmacy Fax:    Start Date: 8/9/2024    Medication changed to a preferred alternative.

## 2024-08-15 ENCOUNTER — DOCUMENTATION ONLY (OUTPATIENT)
Dept: SLEEP MEDICINE | Facility: CLINIC | Age: 74
End: 2024-08-15
Payer: MEDICARE

## 2024-08-15 DIAGNOSIS — G47.33 OSA (OBSTRUCTIVE SLEEP APNEA): Chronic | ICD-10-CM

## 2024-08-15 DIAGNOSIS — E66.01 CLASS 2 SEVERE OBESITY DUE TO EXCESS CALORIES WITH SERIOUS COMORBIDITY AND BODY MASS INDEX (BMI) OF 38.0 TO 38.9 IN ADULT (H): Primary | Chronic | ICD-10-CM

## 2024-08-15 DIAGNOSIS — E66.812 CLASS 2 SEVERE OBESITY DUE TO EXCESS CALORIES WITH SERIOUS COMORBIDITY AND BODY MASS INDEX (BMI) OF 38.0 TO 38.9 IN ADULT (H): Primary | Chronic | ICD-10-CM

## 2024-08-15 NOTE — PROGRESS NOTES
Patient came to [facility] for mask fitting appointment on August 15, 2024. Patient requested to switch masks because oral breathing. Discussed the following masks: Airfit F40, Sheila FF, Airfit F30i. Patient selected a Resmed Mask name: F40 Full Face mask size Medium.   PT OPTED TO KEEP USING THE N30I MASK AND ADD CPAP TAPE TO HELP KEEP HIS MOUTH CLOSED. HE STATED AFTER HIS TRIP IF HE THINKS THE CPAP TAPE IS NOT WORKING HE WILL CALL ME AND HAVE ME SHIP OUT THE F40 MASK. GAVE HIM MY DIRECT #.

## 2024-08-19 NOTE — PROGRESS NOTES
PT LVM LETTING ME KNOW THAT THE TAPE IS WORKING OK BUT HE DOESN'T LIKE HAVING TO USE IT. HE WOULD LIKE ME TO THE THE F40 MASK TO HIM. SHIPPING MASK.

## 2024-08-27 ENCOUNTER — TELEPHONE (OUTPATIENT)
Dept: SLEEP MEDICINE | Facility: CLINIC | Age: 74
End: 2024-08-27
Payer: MEDICARE

## 2024-08-27 DIAGNOSIS — E66.812 CLASS 2 SEVERE OBESITY DUE TO EXCESS CALORIES WITH SERIOUS COMORBIDITY AND BODY MASS INDEX (BMI) OF 38.0 TO 38.9 IN ADULT (H): Primary | Chronic | ICD-10-CM

## 2024-08-27 DIAGNOSIS — G47.33 OSA (OBSTRUCTIVE SLEEP APNEA): Chronic | ICD-10-CM

## 2024-08-27 DIAGNOSIS — E66.01 CLASS 2 SEVERE OBESITY DUE TO EXCESS CALORIES WITH SERIOUS COMORBIDITY AND BODY MASS INDEX (BMI) OF 38.0 TO 38.9 IN ADULT (H): Primary | Chronic | ICD-10-CM

## 2024-08-27 NOTE — TELEPHONE ENCOUNTER
PT CALLED STATING THE NEW MASK (F40) IS SOMETIMES BLOWING A LOT OF AIR OUT THE FRONT AND CAUSING HIGHER MASK LEAK AT TIMES.  HAD HIM CHECK AND IT WAS ON FFM.  TOLD HIM TO PUT IT ON PILLOWS AS THIS IS WHAT IS RECOMMENDED.  ASKED IF THE FLEX TUBE WAS SECURLY ATTACHED ON BOTH SIDES AND HE STATED YES.  HE STATED SOMETIMES THE AIR SEEMS TO BE COMING FROM WHERE THE FLEX TUBE ATTACHES TO THE HTD TUBE.  TOLD HIM TO CHECK THIS EVENING BEFORE HE IS READY FOR BED.  MAKE SURE THERE ARE NO CRACKS IN THE FLEX TUBE.  EXPLAINED THAT THE MICAH VENT HOLES ARE TO LET EXCESS AIR OUT AND THE HIGHER THE PRESSURE THE MORE AIR WILL COME OUT  BUT HE SHOULD NOT BE FEELING AIR WHERE THE TWO TUBES ATTACH.  PT STATED HE WILL TROUBLESHOOT AND TRY THE MASK AGAIN TONIGHT.  TOLD HIM IF HE FEELS HE WANTS TO COME IN W/ MACHINE AND F40 MASK AND TROUBLESHOOT W/ US I CAN HELP HIM SCHEDULE THIS.  PT AGREED W/ THIS PLAN.

## 2024-08-30 ENCOUNTER — TELEPHONE (OUTPATIENT)
Dept: FAMILY MEDICINE | Facility: CLINIC | Age: 74
End: 2024-08-30
Payer: MEDICARE

## 2024-08-30 NOTE — TELEPHONE ENCOUNTER
General Call    Contacts       Contact Date/Time Type Contact Phone/Fax    08/30/2024 09:33 AM CDT Phone (Incoming) Gary Pope (Self) [phone] (M)          Reason for Call:  cpap pressure change    What are your questions or concerns:  dected something it shoot up to 14 waking him up    Date of last appointment with provider: can't rememver    Could we send this information to you in [place] or would you prefer to receive a phone call?:   Patient would prefer a phone call   Okay to leave a detailed message?: Yes at Home number on file [phone] (home)

## 2024-09-12 ENCOUNTER — OFFICE VISIT (OUTPATIENT)
Dept: SLEEP MEDICINE | Facility: CLINIC | Age: 74
End: 2024-09-12
Payer: MEDICARE

## 2024-09-12 VITALS
HEART RATE: 84 BPM | SYSTOLIC BLOOD PRESSURE: 129 MMHG | DIASTOLIC BLOOD PRESSURE: 89 MMHG | OXYGEN SATURATION: 94 % | RESPIRATION RATE: 20 BRPM | WEIGHT: 257 LBS | BODY MASS INDEX: 38.95 KG/M2 | HEIGHT: 68 IN

## 2024-09-12 DIAGNOSIS — E66.01 CLASS 2 SEVERE OBESITY DUE TO EXCESS CALORIES WITH SERIOUS COMORBIDITY AND BODY MASS INDEX (BMI) OF 38.0 TO 38.9 IN ADULT (H): Chronic | ICD-10-CM

## 2024-09-12 DIAGNOSIS — E66.812 CLASS 2 SEVERE OBESITY DUE TO EXCESS CALORIES WITH SERIOUS COMORBIDITY AND BODY MASS INDEX (BMI) OF 38.0 TO 38.9 IN ADULT (H): Chronic | ICD-10-CM

## 2024-09-12 DIAGNOSIS — G47.33 OSA (OBSTRUCTIVE SLEEP APNEA): Primary | Chronic | ICD-10-CM

## 2024-09-12 PROCEDURE — 99214 OFFICE O/P EST MOD 30 MIN: CPT | Performed by: PHYSICIAN ASSISTANT

## 2024-09-12 NOTE — NURSING NOTE
1 year appointment reminder message was created and will be sent out 2 - 3 months prior to next appointment due date. [employer].     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

## 2024-09-12 NOTE — NURSING NOTE
"Chief Complaint   Patient presents with    CPAP Follow Up       Initial /89   Pulse 84   Resp 20   Ht 1.727 m (5' 7.99\")   Wt 116.6 kg (257 lb)   SpO2 94%   BMI 39.09 kg/m   Estimated body mass index is 39.09 kg/m  as calculated from the following:    Height as of this encounter: 1.727 m (5' 7.99\").    Weight as of this encounter: 116.6 kg (257 lb).    Medication Reconciliation: complete    Neck circumference: 18.5 inches / 47 centimeters.    DME: Georgina Buenrostro CMA on 9/12/2024 at 10:35 AM       "

## 2024-09-12 NOTE — PATIENT INSTRUCTIONS
Your Body mass index is 39.09 kg/m .  Weight management is a personal decision.  If you are interested in exploring weight loss strategies, the following discussion covers the approaches that may be successful. Body mass index (BMI) is one way to tell whether you are at a healthy weight, overweight, or obese. It measures your weight in relation to your height.  A BMI of 18.5 to 24.9 is in the healthy range. A person with a BMI of 25 to 29.9 is considered overweight, and someone with a BMI of 30 or greater is considered obese. More than two-thirds of American adults are considered overweight or obese.  Being overweight or obese increases the risk for further weight gain. Excess weight may lead to heart disease and diabetes.  Creating and following plans for healthy eating and physical activity may help you improve your health.  Weight control is part of healthy lifestyle and includes exercise, emotional health, and healthy eating habits. Careful eating habits lifelong are the mainstay of weight control. Though there are significant health benefits from weight loss, long-term weight loss with diet alone may be very difficult to achieve- studies show long-term success with dietary management in less than 10% of people. Attaining a healthy weight may be especially difficult to achieve in those with severe obesity. In some cases, medications, devices and surgical management might be considered.  What can you do?  If you are overweight or obese and are interested in methods for weight loss, you should discuss this with your provider.   Consider reducing daily calorie intake by 500 calories.   Keep a food journal.   Avoiding skipping meals, consider cutting portions instead.    Diet combined with exercise helps maintain muscle while optimizing fat loss. Strength training is particularly important for building and maintaining muscle mass. Exercise helps reduce stress, increase energy, and improves fitness. Increasing  exercise without diet control, however, may not burn enough calories to loose weight.     Start walking three days a week 10-20 minutes at a time  Work towards walking thirty minutes five days a week   Eventually, increase the speed of your walking for 1-2 minutes at time    In addition, we recommend that you review healthy lifestyles and methods for weight loss available through the National Institutes of Health patient information sites:  http://win.niddk.nih.gov/publications/index.htm    And look into health and wellness programs that may be available through your health insurance provider, employer, local community center, or bonita club.

## 2024-09-12 NOTE — PROGRESS NOTES
Sleep Apnea - Follow-up Visit:    Impression/Plan:  Severe obstructive sleep apnea-  Excellent CPAP compliance and AHI is well controlled on CPAP 6-16 cm/H20. Daytime symptoms are improved.   Continue current treatment.   Changed to auto-CPAP 6-14 cm/H20 for his comfort.   Comprehensive DME order placed.     Driss Pope will follow up in about 1 year    30 minutes spent on day of encounter doing chart review,  history and exam, counseling, coordinating plan of care, documentation and further activities as noted above.       Osmin Pope PA-C  Sleep Medicine     History of Present Illness:  Chief Complaint   Patient presents with    CPAP Follow Up       Driss Pope presents for follow-up of their severe sleep apnea, managed with CPAP.     He initially presented with loud snoring, difficulty maintaining sleep, crowded oropharynx, family history of CJ and co-morbid HTN.    3/12/2024 [facility] Sleep Study (254 lbs) - AHI 53.1, RDI 59.3, Supine AHI 59.0, non-supine AHI 1.9 (32 minutes non-supine), REM (supine) AHI 96.9, Low O2 58.0%, Time Spent less than 88% 133.9 minutes / Time Spent less than 89% 198.9 minutes.    August 6, 2024. Patient received a Resmed Airsense 11 Pressures were set at  6-16 cm H2O.    Do you use a CPAP Machine at home: Yes  Overall, on a scale of 0-10 how would you rate your CPAP (0 poor, 10 great): 9    What type of mask do you use: Full Face Mask  Is your mask comfortable: Yes  If not, why:      Is your mask leaking: Yes  If yes, where do you feel it: Sometimes from the bottom of  the mask when the pressure is at 14 or higher.  How many night per week does the mask leak (0-7): Per the jessy 6 or 7.  I only feel it 1 or 2 times when pressure reading is too high.    Do you notice snoring with mask on: No  Do you notice gasping arousals with mask on: No  Are you having significant oral or nasal dryness: No  Is the pressure setting comfortable: No  If not, why: When it stays at 10 or  less it is okay.   When it gets higher than that  it will wake me.    What is your typical bedtime: 11 pm  How long does it take you to go to sleep on PAP therapy: 5-10 minutes  What time do you typically get out of bed for the day: 7am  How many hours on average per night are you using PAP therapy: 6-7  How many hours are you sleeping per night: 6-7  Do you feel well rested in the morning: Yes      ResMed   Auto-PAP 6.0 - 16.0 cmH2O 30 day usage data:    100% of days with > 4 hours of use. 0/30 days with no use.   Average use 446 minutes per day.   95%ile Leak 19.28 L/min.   CPAP 95% pressure 12.4 cm.   AHI 3.61 events per hour.       EPWORTH SLEEPINESS SCALE         9/11/2024     2:22 PM    Truchas Sleepiness Scale ( DELTA [place]  [id number]<br>ESS - [place]/English - Final version - 21 Nov 07 - [facility] Research [place].)   Sitting and reading Slight chance of dozing   Watching TV Slight chance of dozing   Sitting, inactive in a public place (e.g. a theatre or a meeting) Slight chance of dozing   As a passenger in a car for an hour without a break Would never doze   Lying down to rest in the afternoon when circumstances permit Slight chance of dozing   Sitting and talking to someone Would never doze   Sitting quietly after a lunch without alcohol Would never doze   In a car, while stopped for a few minutes in traffic Would never doze   Truchas Score (MC) 4   Truchas Score (Sleep) 4       INSOMNIA SEVERITY INDEX (ELI)          9/11/2024     2:08 PM   Insomnia Severity Index (ELI)   Difficulty falling asleep 0   Difficulty staying asleep 1   Problems waking up too early 1   How SATISFIED/DISSATISFIED are you with your CURRENT sleep pattern? 1   How NOTICEABLE to others do you think your sleep problem is in terms of impairing the quality of your life? 0   How WORRIED/DISTRESSED are you about your current sleep problem? 1   To what extent do you consider your sleep problem to INTERFERE with your daily functioning (e.g.  "daytime fatigue, mood, ability to function at work/daily chores, concentration, memory, mood, etc.) CURRENTLY? 1   ELI Total Score 5       Guidelines for Scoring/Interpretation:  Total score categories:  0-7 = No clinically significant insomnia   8-14 = Subthreshold insomnia   15-21 = Clinical insomnia (moderate severity)  22-28 = Clinical insomnia (severe)  Used via courtesy of www.Ticketbisealth.va.gov with permission from Bennett Tran PhD., [facility]        Past medical/surgical history, family history, social history, medications and allergies were reviewed.        Problem List:  Patient Active Problem List    Diagnosis Date Noted    CJ (obstructive sleep apnea) - severe (AHI 53) 03/21/2024     Priority: Medium     3/12/2024 [facility] Sleep Study (254 lbs) - AHI 53.1, RDI 59.3, Supine AHI 59.0, non-supine AHI 1.9 (32 minutes non-supine), REM (supine) AHI 96.9, Low O2 58.0%, Time Spent ?88% 133.9 minutes / Time Spent ?89% 198.9 minutes.      IFG (impaired fasting glucose) 06/19/2020     Priority: Medium    Mixed dyslipidemia 09/30/2019     Priority: Medium    Class 2 severe obesity due to excess calories with serious comorbidity in adult (H) 10/16/2017     Priority: Medium    History of colonic polyps 12/09/2016     Priority: Medium    Hypertension, goal below 140/90 10/11/2016     Priority: Medium    Benign non-nodular prostatic hyperplasia with lower urinary tract symptoms 12/14/2015     Priority: Medium     S/p KTP vaporization 2019       Stage 3a chronic kidney disease (H) 03/25/2011     Priority: Medium        /89   Pulse 84   Resp 20   Ht 1.727 m (5' 7.99\")   Wt 116.6 kg (257 lb)   SpO2 94%   BMI 39.09 kg/m     "

## 2024-10-09 DIAGNOSIS — I10 HYPERTENSION, GOAL BELOW 140/90: ICD-10-CM

## 2024-10-11 RX ORDER — AMLODIPINE BESYLATE 5 MG/1
5 TABLET ORAL DAILY
Qty: 90 TABLET | Refills: 3 | Status: SHIPPED | OUTPATIENT
Start: 2024-10-11

## 2024-11-05 ENCOUNTER — TRANSFERRED RECORDS (OUTPATIENT)
Dept: HEALTH INFORMATION MANAGEMENT | Facility: CLINIC | Age: 74
End: 2024-11-05
Payer: MEDICARE

## 2024-11-26 ENCOUNTER — TRANSFERRED RECORDS (OUTPATIENT)
Dept: HEALTH INFORMATION MANAGEMENT | Facility: CLINIC | Age: 74
End: 2024-11-26
Payer: MEDICARE

## 2024-12-17 ENCOUNTER — TRANSFERRED RECORDS (OUTPATIENT)
Dept: HEALTH INFORMATION MANAGEMENT | Facility: CLINIC | Age: 74
End: 2024-12-17
Payer: MEDICARE

## 2025-01-03 NOTE — PROGRESS NOTES
[facility] Dermatology Note  Encounter Date: Jan 22, 2025  Office Visit     Reviewed patients past medical history and pertinent chart review prior to patients visit today.     Dermatology Problem List:  Last skin check: 01/22/2025    History of nonmelanoma skin cancer  -Basal cell carcinoma, left forehead, s/p bx 3/25/24 , s/p Mohs 5/8/24   -Basal cell carcinoma, left jawline, s/p bx 3/25/24 , s/p Mohs 5/8/24   2. AK   -cryo 1/22/2025    ____________________________________________    Assessment & Plan:     # actinic keratoses  Actinic keratoses are pre-cancerous skin growths caused by sun exposure. Treatment is recommended and medically indicated. Treated with cryotherapy as outlined below.     Procedures performed:   - Cryotherapy procedure note, location(s): Forehead. After verbal consent and discussion of risks and benefits including, but not limited to, dyspigmentation/scar, blister, and pain, 1 lesion(s) was(were) treated with 1-2 mm freeze border for 1-2 cycles with liquid nitrogen. Post cryotherapy instructions were provided.      # Personal history of nonmelanoma skin cancer  - No signs of recurrence. Continued observation recommended.   - Sun protection: Counseled SPF 30+ sunscreen, UPF clothing, sun avoidance, tanning bed avoidance.    # Multiple nevi, trunk and extremities  # Solar lentigines  - Nevi demonstrate no concerning features on dermoscopy. We discussed the importance of self exams at home.   - ABCDEs: Counseled ABCDEs of melanoma: Asymmetry, Border (irregularity), Color (not uniform, changes in color), Diameter (greater than 6 mm which is about the size of a pencil eraser), and Evolving (any changes in preexisting moles).    # Cherry angiomas  # Seborrheic keratoses  - We discussed the benign nature of the skin lesions. No treatment required. Continued observation recommended. Follow up with any concerns.        Follow-up:  6 months for follow up full body skin exam, as  needed for new or changing lesions or new concerns    All risks, benefits and alternatives were discussed with patient.  Patient is in agreement and understands the assessment and plan.  All questions were answered.  Emelia Chau PA-C  [facility] Dermatology    ____________________________________________    CC: No chief complaint on file.    HPI:  Mr. Driss Pope is a(n) 74 year old male who presents today as a return patient for a full body skin cancer screening. The patient has a history of nonmelanoma skin cancer. The patient was last seen in dermatology 08/08/2024 Today, the patient reports small bump on the buttock. This lesion has been present for several years. He would like to make sure it is not of concern. No other cutaneous concerns. Is being diligent with photoprotection.      Physical Exam:  Vitals: There were no vitals taken for this visit.   SKIN: Total skin excluding the genitalia areas was performed. The exam included the scalp, face, neck, bilateral arms, chest, back, abdomen, bilateral legs, digits, mons pubis, buttocks, and nails.   - Oglesby II.  - left buttock, flesh colored to pink nodule with reassuring features  -left forehead, pink macule(s) with overlying adherent scale consistent with an actinic keratosis   - Multiple tan/brown macules and papules scattered throughout exam, consistent with benign nevi. No concerning features on dermoscopy.   - Scattered tan, homogenous macules scattered on sun exposed skin, consistent with solar lentigines.   - Scattered waxy, stuck on appearing papules and patches, consistent with seborrheic keratoses.  - Several 1-2 mm red dome shaped symmetric papules, consistent with cherry angiomas.     - No other lesions of concern on areas examined.     Medications:  Current Outpatient Medications   Medication Sig Dispense Refill    amLODIPine (NORVASC) 5 MG tablet Take 1 tablet (5 mg) by mouth daily. 90 tablet 3    atorvastatin (LIPITOR) 20 MG  "tablet TAKE 1 TABLET DAILY 90 tablet 3    Krill Oil 1000 MG CAPS Take by mouth daily 750 mg to 1000 mg daily      Misc Natural Products (GLUCOSAMINE CHONDROITIN ADV PO) 2-3 tablets daily \"Move free pills\"      Multiple Vitamins-Minerals (MULTIVITAL PO) Once daily      valsartan (DIOVAN) 320 MG tablet TAKE 1 TABLET DAILY.       (REPLACES LOSARTAN) 90 tablet 3    Vitamin D3 (CHOLECALCIFEROL) 125 MCG (5000 UT) tablet Take 1 tablet by mouth daily       No current facility-administered medications for this visit.      Past Medical History:   Patient Active Problem List   Diagnosis    Stage 3a chronic kidney disease (H)    Benign non-nodular prostatic hyperplasia with lower urinary tract symptoms    Hypertension, goal below 140/90    Class 2 severe obesity due to excess calories with serious comorbidity in adult (H)    Mixed dyslipidemia    IFG (impaired fasting glucose)    History of colonic polyps    CJ (obstructive sleep apnea) - severe (AHI 53)     Past Medical History:   Diagnosis Date    Acute gout 03/29/2019    Chronic kidney disease     Complication of anesthesia     Hyperlipidemia     Hypertension goal BP (blood pressure) < 130/80     Impingement syndrome of both shoulders 03/29/2019    Obesity     CJ (obstructive sleep apnea) - severe (AHI 53) 3/21/2024    Osteoarthritis        CC No referring provider defined for this encounter. on close of this encounter.  "

## 2025-01-03 NOTE — PATIENT INSTRUCTIONS
Cryotherapy    What is it?  Use of a very cold liquid, such as liquid nitrogen, to freeze and destroy abnormal skin cells that need to be removed    What should I expect?  Tenderness and redness  A small blister that might grow and fill with dark purple blood. There may be crusting.  More than one treatment may be needed if the lesions do not go away.    How do I care for the treated area?  Gently wash the area with your hands when bathing.  Use a thin layer of Vaseline to help with healing. You may use a Band-Aid.   The area should heal within 7-10 days and may leave behind a pink or lighter color.   Do not use an antibiotic or Neosporin ointment.   You may take acetaminophen (Tylenol) for pain.     Call your doctor if you have:  Severe pain  Signs of infection (warmth, redness, cloudy yellow drainage, and or a bad smell)  Questions or concerns    Who should I call with questions?      [facility]: [phone]      [facility]: [phone]      For urgent needs outside of business hours call the [facility] at [phone] and ask for the dermatology resident on call The ABCDEs of Melanoma    Skin cancer can develop anywhere on the skin. Ask someone for help when checking your skin, especially in hard to see places. If you notice a mole different from others, or that changes, enlarges, itches, or bleeds (even if it is small), you should see a dermatologist.

## 2025-01-22 ENCOUNTER — OFFICE VISIT (OUTPATIENT)
Dept: DERMATOLOGY | Facility: CLINIC | Age: 75
End: 2025-01-22
Payer: MEDICARE

## 2025-01-22 DIAGNOSIS — L57.0 ACTINIC KERATOSIS: ICD-10-CM

## 2025-01-22 DIAGNOSIS — D22.9 MULTIPLE BENIGN NEVI: ICD-10-CM

## 2025-01-22 DIAGNOSIS — Z85.828 HISTORY OF NONMELANOMA SKIN CANCER: Primary | ICD-10-CM

## 2025-01-22 DIAGNOSIS — L81.4 SOLAR LENTIGINOSIS: ICD-10-CM

## 2025-01-22 DIAGNOSIS — D18.01 CHERRY ANGIOMA: ICD-10-CM

## 2025-01-22 NOTE — NURSING NOTE
Driss Pope's goals for this visit include:   Chief Complaint   Patient presents with    Skin Check     Patient is here for a full body skin check, areas of concern bump on buttock, HX of NMSC       He requests these members of his care team be copied on today's visit information:     PCP: Adam Soliz    Referring Provider:  Referred Self, MD  No address on file    There were no vitals taken for this visit.    Do you need any medication refills at today's visit?     Rajwinder Patel EMT

## 2025-01-22 NOTE — LETTER
1/22/2025      Driss Pope  [address]  [facility] [address]      Dear Colleague,    Thank you for referring your patient, Driss Pope, to the [facility]. Please see a copy of my visit note below.    [facility] Dermatology Note  Encounter Date: Jan 22, 2025  Office Visit     Reviewed patients past medical history and pertinent chart review prior to patients visit today.     Dermatology Problem List:  Last skin check: 01/22/2025    History of nonmelanoma skin cancer  -Basal cell carcinoma, left forehead, s/p bx 3/25/24 , s/p Mohs 5/8/24   -Basal cell carcinoma, left jawline, s/p bx 3/25/24 , s/p Mohs 5/8/24   2. AK   -cryo 1/22/2025    ____________________________________________    Assessment & Plan:     # actinic keratoses  Actinic keratoses are pre-cancerous skin growths caused by sun exposure. Treatment is recommended and medically indicated. Treated with cryotherapy as outlined below.     Procedures performed:   - Cryotherapy procedure note, location(s): Forehead. After verbal consent and discussion of risks and benefits including, but not limited to, dyspigmentation/scar, blister, and pain, 1 lesion(s) was(were) treated with 1-2 mm freeze border for 1-2 cycles with liquid nitrogen. Post cryotherapy instructions were provided.      # Personal history of nonmelanoma skin cancer  - No signs of recurrence. Continued observation recommended.   - Sun protection: Counseled SPF 30+ sunscreen, UPF clothing, sun avoidance, tanning bed avoidance.    # Multiple nevi, trunk and extremities  # Solar lentigines  - Nevi demonstrate no concerning features on dermoscopy. We discussed the importance of self exams at home.   - ABCDEs: Counseled ABCDEs of melanoma: Asymmetry, Border (irregularity), Color (not uniform, changes in color), Diameter (greater than 6 mm which is about the size of a pencil eraser), and Evolving (any changes in preexisting moles).    #  Cherry angiomas  # Seborrheic keratoses  - We discussed the benign nature of the skin lesions. No treatment required. Continued observation recommended. Follow up with any concerns.        Follow-up:  6 months for follow up full body skin exam, as needed for new or changing lesions or new concerns    All risks, benefits and alternatives were discussed with patient.  Patient is in agreement and understands the assessment and plan.  All questions were answered.  Emelia Chau PA-C  [facility]    ____________________________________________    CC: No chief complaint on file.    HPI:  Mr. Driss Pope is a(n) 74 year old male who presents today as a return patient for a full body skin cancer screening. The patient has a history of nonmelanoma skin cancer. The patient was last seen in dermatology 08/08/2024 Today, the patient reports small bump on the buttock. This lesion has been present for several years. He would like to make sure it is not of concern. No other cutaneous concerns. Is being diligent with photoprotection.      Physical Exam:  Vitals: There were no vitals taken for this visit.   SKIN: Total skin excluding the genitalia areas was performed. The exam included the scalp, face, neck, bilateral arms, chest, back, abdomen, bilateral legs, digits, mons pubis, buttocks, and nails.   - Oglesby II.  - left buttock, flesh colored to pink nodule with reassuring features  -left forehead, pink macule(s) with overlying adherent scale consistent with an actinic keratosis   - Multiple tan/brown macules and papules scattered throughout exam, consistent with benign nevi. No concerning features on dermoscopy.   - Scattered tan, homogenous macules scattered on sun exposed skin, consistent with solar lentigines.   - Scattered waxy, stuck on appearing papules and patches, consistent with seborrheic keratoses.  - Several 1-2 mm red dome shaped symmetric papules, consistent with cherry angiomas.  "    - No other lesions of concern on areas examined.     Medications:  Current Outpatient Medications   Medication Sig Dispense Refill     amLODIPine (NORVASC) 5 MG tablet Take 1 tablet (5 mg) by mouth daily. 90 tablet 3     atorvastatin (LIPITOR) 20 MG tablet TAKE 1 TABLET DAILY 90 tablet 3     Krill Oil 1000 MG CAPS Take by mouth daily 750 mg to 1000 mg daily       Misc Natural Products (GLUCOSAMINE CHONDROITIN ADV PO) 2-3 tablets daily \"Move free pills\"       Multiple Vitamins-Minerals (MULTIVITAL PO) Once daily       valsartan (DIOVAN) 320 MG tablet TAKE 1 TABLET DAILY.       (REPLACES LOSARTAN) 90 tablet 3     Vitamin D3 (CHOLECALCIFEROL) 125 MCG (5000 UT) tablet Take 1 tablet by mouth daily       No current facility-administered medications for this visit.      Past Medical History:   Patient Active Problem List   Diagnosis     Stage 3a chronic kidney disease (H)     Benign non-nodular prostatic hyperplasia with lower urinary tract symptoms     Hypertension, goal below 140/90     Class 2 severe obesity due to excess calories with serious comorbidity in adult (H)     Mixed dyslipidemia     IFG (impaired fasting glucose)     History of colonic polyps     CJ (obstructive sleep apnea) - severe (AHI 53)     Past Medical History:   Diagnosis Date     Acute gout 03/29/2019     Chronic kidney disease      Complication of anesthesia      Hyperlipidemia      Hypertension goal BP (blood pressure) < 130/80      Impingement syndrome of both shoulders 03/29/2019     Obesity      CJ (obstructive sleep apnea) - severe (AHI 53) 3/21/2024     Osteoarthritis        CC No referring provider defined for this encounter. on close of this encounter.      Again, thank you for allowing me to participate in the care of your patient.        Sincerely,    Emelia Chau PA-C    Electronically signed  "

## 2025-01-27 ENCOUNTER — NURSE TRIAGE (OUTPATIENT)
Dept: FAMILY MEDICINE | Facility: CLINIC | Age: 75
End: 2025-01-27
Payer: MEDICARE

## 2025-01-27 NOTE — TELEPHONE ENCOUNTER
Nurse Triage SBAR    Is this a 2nd Level Triage? NO    Situation: patient calling in that these symptom have been ongoing for about 2 weeks, started out with a sore throat and cough. Cough now went away. Patient has been having some headaches and lightheaded once and a while.     Background: multiple symptoms have been ongoing for 2 weeks     Assessment:     1. ONSET: patient not having any nasal drainage right now   2. AMOUNT: never really have a dry nose,     3. COUGH: had a cough and phlegm but musinex cleared that up   4. RESPIRATORY DISTRESS: Patient said that when he takes deep breaths he cannot get a full breath, feels like he is out of breath faster than normal.  5. FEVER: no   6. SEVERITY: just feels like he is not getting better   7. OTHER SYMPTOMS: Started out with a sore throat but no sore throat now, patient has been getting some headaches and lightheaded episodes once and a while.         BP has been fine 120-130's and HR has been between 70-90's.       Protocol Recommended Disposition:   See in Office Today or Tomorrow    Recommendation: Scheduled patient with pcp for tomorrow, but with the headaches and lightheadedness symptoms- patient wondering if he should be seen sooner than tomrrow.          Routed to provider    Does the patient meet one of the following criteria for ADS visit consideration? No    Reason for Disposition   Patient wants to be seen    Additional Information   Negative: SEVERE difficulty breathing (e.g., struggling for each breath, speaks in single words)   Negative: Very weak (can't stand)   Negative: Sounds like a life-threatening emergency to the triager   Negative: Symptoms of COVID-19 (e.g., cough, fever, SOB, or others) and COVID-19 is widespread in the community   Negative: Symptoms of COVID-19 (e.g., cough, fever, SOB, or others) and within 14 days of COVID-19 EXPOSURE   Negative: Symptoms of FLU (e.g., cough, runny nose, SOB, sore throat; with or without fever) and within  14 days of EXPOSURE (close contact) with someone diagnosed with influenza (e.g., flu test positive)   Negative: Difficulty breathing and not from stuffy nose (e.g., not relieved by cleaning out the nose)   Negative: Runny nose is caused by pollen or other allergies   Negative: Cough is main symptom   Negative: Sore throat is main symptom   Negative: Patient sounds very sick or weak to the triager   Negative: Fever > 103 F (39.4 C)   Negative: Fever > 101 F (38.3 C) and over 60 years of age   Negative: Fever > 100 F (37.8 C) and has diabetes mellitus or a weak immune system (e.g., HIV positive, cancer chemotherapy, organ transplant, splenectomy, chronic steroids)   Negative: Fever > 100 F (37.8 C) and bedridden (e.g., CVA, chronic illness, recovering from surgery)   Negative: Fever present > 3 days (72 hours)   Negative: Fever returns after gone for over 24 hours and symptoms worse or not improved   Negative: Sinus pain (not just congestion) and fever   Negative: Earache   Negative: Sinus congestion (pressure, fullness) present > 10 days   Negative: Nasal discharge present > 10 days   Negative: Using nasal washes and pain medicine > 24 hours and sinus pain (lower forehead, cheekbone, or eye) persists    Protocols used: Common Cold-A-[place]

## 2025-01-27 NOTE — TELEPHONE ENCOUNTER
Called patient and relayed provider's message below as written. Patient stated he did not take temperature earlier when talking with nurse and temp is 98.6. Advise [facility] is open until 8 pm today if needed, patient verbalized understanding.     MALU Wolf  [facility]

## 2025-01-27 NOTE — TELEPHONE ENCOUNTER
I agree with RN assessment that with these reported symptoms he should be seen within 12 to 24 hours and an appointment tomorrow would be reasonable.    However, if patient feels there is something more worrisome at play then he should certainly listen to his body and recommend urgent care today.

## 2025-01-28 ENCOUNTER — OFFICE VISIT (OUTPATIENT)
Dept: FAMILY MEDICINE | Facility: CLINIC | Age: 75
End: 2025-01-28
Payer: MEDICARE

## 2025-01-28 VITALS
WEIGHT: 260 LBS | HEART RATE: 90 BPM | TEMPERATURE: 98.4 F | RESPIRATION RATE: 18 BRPM | DIASTOLIC BLOOD PRESSURE: 72 MMHG | OXYGEN SATURATION: 100 % | SYSTOLIC BLOOD PRESSURE: 148 MMHG | HEIGHT: 68 IN | BODY MASS INDEX: 39.4 KG/M2

## 2025-01-28 DIAGNOSIS — R51.9 NONINTRACTABLE EPISODIC HEADACHE, UNSPECIFIED HEADACHE TYPE: ICD-10-CM

## 2025-01-28 DIAGNOSIS — R05.2 SUBACUTE COUGH: Primary | ICD-10-CM

## 2025-01-28 DIAGNOSIS — I10 HYPERTENSION, GOAL BELOW 140/90: Chronic | ICD-10-CM

## 2025-01-28 DIAGNOSIS — G47.33 OSA (OBSTRUCTIVE SLEEP APNEA): Chronic | ICD-10-CM

## 2025-01-28 DIAGNOSIS — K29.00 ACUTE GASTRITIS WITHOUT HEMORRHAGE, UNSPECIFIED GASTRITIS TYPE: ICD-10-CM

## 2025-01-28 PROCEDURE — 99214 OFFICE O/P EST MOD 30 MIN: CPT | Mod: 24 | Performed by: INTERNAL MEDICINE

## 2025-01-28 PROCEDURE — G2211 COMPLEX E/M VISIT ADD ON: HCPCS | Performed by: INTERNAL MEDICINE

## 2025-01-28 ASSESSMENT — ENCOUNTER SYMPTOMS: HEADACHES: 1

## 2025-01-28 ASSESSMENT — PAIN SCALES - GENERAL: PAINLEVEL_OUTOF10: MILD PAIN (3)

## 2025-01-28 NOTE — PROGRESS NOTES
"  Assessment & Plan     1.  Non-intractable episode of headache for the past couple of weeks.  Seem like started along with a URI symptoms.  Short-lived, 10 to 15-minute pressure type headache relieved with Tylenol.  Not quite sure what to make of it.  Question a tension headache.  I do not think this is a vascular headache.    I told patient to wait for a week or 2 and if his symptoms persist then we will do a diagnostic study such as an MRI scan or a CT scan of the head.  2.  Subacute cough x 2 weeks probably triggered by URI.  It is a nonproductive cough which should self improved but it could also be related #3.    3. acute gastritis with definite tenderness in the epigastric region on exam.  Prescribed a trial of omeprazole 20 mg a day and see how he does over the next few weeks.  4.  Obstructive sleep apnea uses CPAP regularly.  5.  Essential hypertension with blood pressure little high today though at home it has been in the 120s over 80s.    I recommend treating in the suspected gastritis      BMI  Estimated body mass index is 39.53 kg/m  as calculated from the following:    Height as of this encounter: 1.727 m (5' 8\").    Weight as of this encounter: 117.9 kg (260 lb).       Subjective   Gary is a 74 year old, presenting for the following health issues:  Headache (URI type symptoms about 2 weeks ago, currently having headaches and feeling faint and feeling inflammation in chest )      1/28/2025    11:18 AM   Additional Questions   Roomed by Omayra ALFARO   Accompanied by Self     Headache     History of Present Illness       Headaches:   Since the patient's last clinic visit, headaches are: no change  The patient is getting headaches:  1-3 times a day  He is able to do normal daily activities when he has a migraine.  The patient is taking the following rescue/relief medications:  Tylenol   Patient states \"I get total relief\" from the rescue/relief medications.   The patient is taking the following medications to " "prevent migraines:  No medications to prevent migraines  In the past 4 weeks, the patient has gone to an Urgent Care or Emergency Room 0 times times due to headaches.   He is taking medications regularly.     Concern - Ongoing Headache, Chest Inflammation   Onset: 2 weeks ago   Description: Started with a sore throat and a pressure headache - headache has moved to back of skull.  Feels inflammation in chest.  Has intermittent episodes of SOB with activity and deep breathes and feels dizzy   Intensity: moderate  Progression of Symptoms:  waxing and waning  Accompanying Signs & Symptoms: None   Previous history of similar problem: None   Precipitating factors:        Worsened by: None   Alleviating factors:        Improved by: Mucenix, Tylenol, Ibuprofen, Cough Drops   Therapies tried and outcome: Mucenix, Tylenol, Ibuprofen, Cough Drops.     74-year-old gentleman comes in stating that about 2 weeks ago she he had a sore throat and he was coughing some phlegm.  He took Mucinex.  Now he has a residual dry cough.  But he has noticed bilateral pressure type headache in the temporal region off-and-on and partly related to coughing for the past couple of weeks.  Is more of a pressure type headache that lasted 10-15 minutes.  Tylenol seems to make it go away but it goes away on its own as well.  It is episodic maybe occurring 2-3 times a day.  No history of trauma.  No vision problems.  No nausea vomiting.  He has also noticed discomfort in the epigastric area.  Denies heartburn as such.  On deep breathing he can feel discomfort.  No nausea or vomiting.    Review of Systems  Constitutional, HEENT, cardiovascular, pulmonary, GI, , musculoskeletal, neuro, skin, endocrine and psych systems are negative, except as otherwise noted.      Objective    BP (!) 146/81 (BP Location: Right arm, Patient Position: Sitting, Cuff Size: Adult Large)   Pulse 90   Temp 98.4  F (36.9  C) (Oral)   Resp 18   Ht 1.727 m (5' 8\")   Wt 117.9 " kg (260 lb)   SpO2 100%   BMI 39.53 kg/m    Body mass index is 39.53 kg/m .  Physical Exam   GENERAL: alert and no distress  HENT: ear canals and TM's normal, nose and mouth without ulcers or lesions  NECK: no adenopathy, no asymmetry, masses, or scars  RESP: lungs clear to auscultation - no rales, rhonchi or wheezes  CV: regular rate and rhythm, normal S1 S2, no S3 or S4, no murmur, click or rub, no peripheral edema  ABDOMEN: tenderness epigastric, bowel sounds normal, and no palpable or pulsatile masses            Signed Electronically by: Adam Soliz MD

## 2025-03-18 ENCOUNTER — TRANSFERRED RECORDS (OUTPATIENT)
Dept: HEALTH INFORMATION MANAGEMENT | Facility: CLINIC | Age: 75
End: 2025-03-18
Payer: MEDICARE

## 2025-06-17 ENCOUNTER — TRANSFERRED RECORDS (OUTPATIENT)
Dept: HEALTH INFORMATION MANAGEMENT | Facility: CLINIC | Age: 75
End: 2025-06-17
Payer: MEDICARE

## 2025-07-03 DIAGNOSIS — I10 HYPERTENSION, GOAL BELOW 140/90: ICD-10-CM

## 2025-07-07 ENCOUNTER — PATIENT OUTREACH (OUTPATIENT)
Dept: CARE COORDINATION | Facility: CLINIC | Age: 75
End: 2025-07-07
Payer: MEDICARE

## 2025-07-07 RX ORDER — VALSARTAN 320 MG/1
TABLET ORAL
Qty: 90 TABLET | Refills: 1 | Status: SHIPPED | OUTPATIENT
Start: 2025-07-07

## 2025-07-07 RX ORDER — ATORVASTATIN CALCIUM 20 MG/1
20 TABLET, FILM COATED ORAL DAILY
Qty: 90 TABLET | Refills: 1 | Status: SHIPPED | OUTPATIENT
Start: 2025-07-07

## 2025-07-11 NOTE — PROGRESS NOTES
[facility] Dermatology Note  Encounter Date: Jul 28, 2025  Office Visit     Reviewed patients past medical history and pertinent chart review prior to patients visit today.     Dermatology Problem List:  Last skin check: 7/28/25    0. NUB right jawline, shave biopsy 07/28/25 .     History of NMSC  -BCC, left forehead, s/p bx 3/25/24 , s/p Mohs 5/8/24   -BCC, left jawline, s/p bx 3/25/24 , s/p Mohs 5/8/24   2. AK   -cryo 1/22/2025    Family Hx: No family history of skin cancer   ____________________________________________    Assessment & Plan:     # Neoplasm of uncertain behavior:  right jawline  DDx includes lentigo vs lentigo maligna.  Shave biopsy today.    Procedure Note: Biopsy by shave technique  The risks and benefits of the procedure were described to the patient. These include but are not limited to bleeding, infection, scar, incomplete removal, and non-diagnostic biopsy. Verbal informed consent was obtained. The above site(s) was cleansed with an alcohol pad and injected with 1% lidocaine with epinephrine. Once anesthesia was obtained, a biopsy(ies) was performed with Gilette blade. The tissue(s) was placed in a labeled container(s) with formalin and sent to pathology. Hemostasis was achieved with aluminum chloride. Vaseline and a bandage were applied to the wound(s). The patient tolerated the procedure well and was given post biopsy care instructions.     # Personal history of nonmelanoma skin cancer  - No signs of recurrence. Continued observation recommended.   - Sun protection: Counseled SPF 30+ sunscreen, UPF clothing, sun avoidance, tanning bed avoidance.    # Multiple nevi, trunk and extremities  # Solar lentigines  - Nevi demonstrate no concerning features on dermoscopy. We discussed the importance of self exams at home.   - ABCDEs: Counseled ABCDEs of melanoma: Asymmetry, Border (irregularity), Color (not uniform, changes in color), Diameter (greater than 6 mm which is about  the size of a pencil eraser), and Evolving (any changes in preexisting moles).    # Cherry angiomas  # Seborrheic keratoses  - We discussed the benign nature of the skin lesions. No treatment required. Continued observation recommended. Follow up with any concerns.      Follow-up:  6 months for follow up full body skin exam, as needed for new or changing lesions or new concerns    All risks, benefits and alternatives were discussed with patient.  Patient is in agreement and understands the assessment and plan.  All questions were answered.  Emelia Chau PA-C  [facility]    ____________________________________________    CC: Skin Check (FBSC- no areas of concern. Hx of NMSC. )    HPI:  Mr. Driss Pope is a(n) 75 year old male who presents today as a return patient for a full body skin cancer screening. The patient has a history of nonmelanoma skin cancer. The patient was last seen in dermatology 1/22/25. Today, the patient reports no specific cutaneous concerns. Patient being diligent with photoprotection.      Physical Exam:  Vitals: There were no vitals taken for this visit.   SKIN: Total skin including the genitalia areas was performed with a chaperone in the room. The exam included the scalp, face, neck, bilateral arms, chest, back, abdomen, bilateral legs, digits, mons pubis, buttocks, and nails.   - Oglesby II.  - NUB, right jawline, tan macule with angulated lines on dermoscopy  - Multiple tan/brown macules and papules scattered throughout exam, consistent with benign nevi. No concerning features on dermoscopy.   - Scattered tan, homogenous macules scattered on sun exposed skin, consistent with solar lentigines.   - Scattered waxy, stuck on appearing papules and patches, consistent with seborrheic keratoses.  - Several 1-2 mm red dome shaped symmetric papules, consistent with cherry angiomas.     - No other lesions of concern on areas examined.     Medications:  Current  "Outpatient Medications   Medication Sig Dispense Refill    amLODIPine (NORVASC) 5 MG tablet Take 1 tablet (5 mg) by mouth daily. 90 tablet 3    atorvastatin (LIPITOR) 20 MG tablet TAKE 1 TABLET DAILY 90 tablet 1    Krill Oil 1000 MG CAPS Take by mouth daily 750 mg to 1000 mg daily      Misc Natural Products (GLUCOSAMINE CHONDROITIN ADV PO) 2-3 tablets daily \"Move free pills\"      Multiple Vitamins-Minerals (MULTIVITAL PO) Once daily      omeprazole (PRILOSEC) 20 MG DR capsule Take 1 capsule (20 mg) by mouth daily. 90 capsule 0    valsartan (DIOVAN) 320 MG tablet TAKE 1 TABLET DAILY.       (REPLACES LOSARTAN) 90 tablet 1    Vitamin D3 (CHOLECALCIFEROL) 125 MCG (5000 UT) tablet Take 1 tablet by mouth daily       No current facility-administered medications for this visit.      Past Medical History:   Patient Active Problem List   Diagnosis    Stage 3a chronic kidney disease (H)    Benign non-nodular prostatic hyperplasia with lower urinary tract symptoms    Hypertension, goal below 140/90    Class 2 severe obesity due to excess calories with serious comorbidity in adult (H)    Mixed dyslipidemia    IFG (impaired fasting glucose)    History of colonic polyps    CJ (obstructive sleep apnea) - severe (AHI 53)     Past Medical History:   Diagnosis Date    Acute gout 03/29/2019    Chronic kidney disease     Complication of anesthesia     Hyperlipidemia     Hypertension goal BP (blood pressure) < 130/80     Impingement syndrome of both shoulders 03/29/2019    Obesity     CJ (obstructive sleep apnea) - severe (AHI 53) 3/21/2024    Osteoarthritis        CC No referring provider defined for this encounter. on close of this encounter.  "

## 2025-07-11 NOTE — PATIENT INSTRUCTIONS
Wound Care After a Biopsy    What is a skin biopsy?  A skin biopsy allows the doctor to examine a very small piece of tissue under the microscope to determine the diagnosis and the best treatment for the skin condition. A local anesthetic (numbing medicine) is injected with a very small needle into the skin area to be tested. A small piece of skin is taken from the area. Sometimes a suture (stitch) is used.     What are the risks of a skin biopsy?  I will experience scar, bleeding, swelling, pain, crusting and redness. I may experience incomplete removal or recurrence. Risks of this procedure are excessive bleeding, bruising, infection, nerve damage, numbness, thick (hypertrophic or keloidal) scar and non-diagnostic biopsy.    How should I care for my wound for the first 24 hours?  Keep the wound dry and covered for 24 hours  If it bleeds, hold direct pressure on the area for 15 minutes. If bleeding does not stop, call us or go to the emergency room  Avoid strenuous exercise the first 1-2 days or as your doctor instructs you    How should I care for the wound after 24 hours?  After 24 hours, remove the bandage  You may bathe or shower as normal  If you had a scalp biopsy, you can shampoo as usual and can use shower water to clean the biopsy site daily  Clean the wound once a day with gentle soap and water  Do not scrub, be gentle  Apply white petroleum/Vaseline after cleaning the wound with a cotton swab or a clean finger, and keep the site covered with a Bandaid /bandage. Bandages are not necessary with a scalp biopsy  If you are unable to cover the site with a Bandaid /bandage, re-apply ointment 2-3 times a day to keep the site moist. Moisture will help with healing  Avoid strenuous activity for first 1-2 days  Avoid lakes, rivers, pools, and oceans until the stitches are removed or the site is healed    How do I clean my wound?  Wash hands thoroughly with soap or use hand  before all wound care  Clean  the wound with gentle soap and water  Apply white petroleum/Vaseline  to wound after it is clean  Replace the Bandaid /bandage to keep the wound covered for the first few days or as instructed by your doctor  If you had a scalp biopsy, warm shower water to the area on a daily basis should suffice    What should I use to clean my wound?   Cotton-tipped applicators (Qtips )  White petroleum jelly (Vaseline ). Use a clean new container and use Q-tips to apply.  Bandaids  as needed  Gentle soap     How should I care for my wound long term?  Do not get your wound dirty  Keep up with wound care for one week or until the area is healed.  A small scab will form and fall off by itself when the area is completely healed. The area will be red and will become pink in color as it heals. Sun protection is very important for how your scar will turn out. Sunscreen with an SPF 30 or greater is recommended once the area is healed.  You should have some soreness but it should be mild and slowly go away over several days. Talk to your doctor about using tylenol for pain,    When should I call my doctor?  If you have increased:   Pain or swelling  Pus or drainage (clear or slightly yellow drainage is ok)  Temperature over 100F  Spreading redness or warmth around wound    When will I hear about my results?  The biopsy results can take 2 weeks to come back.  Your results will automatically release to [facility] before your provider has even reviewed them.  The clinic will call you with the results, send you a [facility] message, or have you schedule a follow-up clinic or phone time to discuss the results.  Contact our clinics if you do not hear from us in 2 weeks.    Who should I call with questions?  [facility]: [phone]  [facility]: [phone]  For urgent needs outside of business hours call the [facility] at [phone] and ask for the dermatology resident on call        The ABCDEs of Melanoma    Skin cancer can develop anywhere on the skin. Ask someone for help when checking your skin, especially in hard to see places. If you notice a mole different from others, or that changes, enlarges, itches, or bleeds (even if it is small), you should see a dermatologist.

## 2025-07-21 ENCOUNTER — PATIENT OUTREACH (OUTPATIENT)
Dept: CARE COORDINATION | Facility: CLINIC | Age: 75
End: 2025-07-21
Payer: MEDICARE

## 2025-07-28 ENCOUNTER — OFFICE VISIT (OUTPATIENT)
Dept: DERMATOLOGY | Facility: CLINIC | Age: 75
End: 2025-07-28
Payer: MEDICARE

## 2025-07-28 DIAGNOSIS — L82.1 SEBORRHEIC KERATOSES: ICD-10-CM

## 2025-07-28 DIAGNOSIS — Z85.828 HISTORY OF NONMELANOMA SKIN CANCER: ICD-10-CM

## 2025-07-28 DIAGNOSIS — D22.9 MULTIPLE BENIGN NEVI: ICD-10-CM

## 2025-07-28 DIAGNOSIS — D48.5 NEOPLASM OF UNCERTAIN BEHAVIOR OF SKIN: ICD-10-CM

## 2025-07-28 DIAGNOSIS — D18.01 CHERRY ANGIOMA: ICD-10-CM

## 2025-07-28 DIAGNOSIS — Z12.83 SCREENING EXAM FOR SKIN CANCER: Primary | ICD-10-CM

## 2025-07-28 DIAGNOSIS — L81.4 SOLAR LENTIGINOSIS: ICD-10-CM

## 2025-07-28 ASSESSMENT — PAIN SCALES - GENERAL: PAINLEVEL_OUTOF10: MILD PAIN (3)

## 2025-07-28 NOTE — NURSING NOTE
The following medication was given:     MEDICATION:  Lidocaine with epinephrine 1% [id number]  ROUTE: SQ  SITE: see procedure note  DOSE: 0.5 mL  LOT #: [id number]  : pfizer  EXPIRATION DATE: 11-  NDC#: 9104-8680-80  Was there drug waste? Yes, 0.5 mL  Multi-dose vial: Yes    Lian Townsend RN  July 28, 2025

## 2025-07-28 NOTE — LETTER
7/28/2025      Driss Pope  [address]  [facility] [address]      Dear Colleague,    Thank you for referring your patient, Driss Pope, to the [facility]. Please see a copy of my visit note below.    Former[facility] Dermatology Note  Encounter Date: Jul 28, 2025  Office Visit     Reviewed patients past medical history and pertinent chart review prior to patients visit today.     Dermatology Problem List:  Last skin check: 7/28/25    0. NUB right jawline, shave biopsy 07/28/25 .     History of NMSC  -BCC, left forehead, s/p bx 3/25/24 , s/p Mohs 5/8/24   -BCC, left jawline, s/p bx 3/25/24 , s/p Mohs 5/8/24   2. AK   -cryo 1/22/2025    Family Hx: No family history of skin cancer   ____________________________________________    Assessment & Plan:     # Neoplasm of uncertain behavior:  right jawline  DDx includes lentigo vs lentigo maligna.  Shave biopsy today.    Procedure Note: Biopsy by shave technique  The risks and benefits of the procedure were described to the patient. These include but are not limited to bleeding, infection, scar, incomplete removal, and non-diagnostic biopsy. Verbal informed consent was obtained. The above site(s) was cleansed with an alcohol pad and injected with 1% lidocaine with epinephrine. Once anesthesia was obtained, a biopsy(ies) was performed with Gilette blade. The tissue(s) was placed in a labeled container(s) with formalin and sent to pathology. Hemostasis was achieved with aluminum chloride. Vaseline and a bandage were applied to the wound(s). The patient tolerated the procedure well and was given post biopsy care instructions.     # Personal history of nonmelanoma skin cancer  - No signs of recurrence. Continued observation recommended.   - Sun protection: Counseled SPF 30+ sunscreen, UPF clothing, sun avoidance, tanning bed avoidance.    # Multiple nevi, trunk and extremities  # Solar lentigines  - Nevi demonstrate  no concerning features on dermoscopy. We discussed the importance of self exams at home.   - ABCDEs: Counseled ABCDEs of melanoma: Asymmetry, Border (irregularity), Color (not uniform, changes in color), Diameter (greater than 6 mm which is about the size of a pencil eraser), and Evolving (any changes in preexisting moles).    # Cherry angiomas  # Seborrheic keratoses  - We discussed the benign nature of the skin lesions. No treatment required. Continued observation recommended. Follow up with any concerns.      Follow-up:  6 months for follow up full body skin exam, as needed for new or changing lesions or new concerns    All risks, benefits and alternatives were discussed with patient.  Patient is in agreement and understands the assessment and plan.  All questions were answered.  Emelia Chau PA-C  [facility] Dermatology    ____________________________________________    CC: Skin Check (FBSC- no areas of concern. Hx of NMSC. )    HPI:  Mr. Driss Pope is a(n) 75 year old male who presents today as a return patient for a full body skin cancer screening. The patient has a history of nonmelanoma skin cancer. The patient was last seen in dermatology 1/22/25. Today, the patient reports no specific cutaneous concerns. Patient being diligent with photoprotection.      Physical Exam:  Vitals: There were no vitals taken for this visit.   SKIN: Total skin including the genitalia areas was performed with a chaperone in the room. The exam included the scalp, face, neck, bilateral arms, chest, back, abdomen, bilateral legs, digits, mons pubis, buttocks, and nails.   - Oglesby II.  - NUB, right jawline, tan macule with angulated lines on dermoscopy  - Multiple tan/brown macules and papules scattered throughout exam, consistent with benign nevi. No concerning features on dermoscopy.   - Scattered tan, homogenous macules scattered on sun exposed skin, consistent with solar lentigines.   - Scattered waxy,  "stuck on appearing papules and patches, consistent with seborrheic keratoses.  - Several 1-2 mm red dome shaped symmetric papules, consistent with cherry angiomas.     - No other lesions of concern on areas examined.     Medications:  Current Outpatient Medications   Medication Sig Dispense Refill     amLODIPine (NORVASC) 5 MG tablet Take 1 tablet (5 mg) by mouth daily. 90 tablet 3     atorvastatin (LIPITOR) 20 MG tablet TAKE 1 TABLET DAILY 90 tablet 1     Krill Oil 1000 MG CAPS Take by mouth daily 750 mg to 1000 mg daily       Misc Natural Products (GLUCOSAMINE CHONDROITIN ADV PO) 2-3 tablets daily \"Move free pills\"       Multiple Vitamins-Minerals (MULTIVITAL PO) Once daily       omeprazole (PRILOSEC) 20 MG DR capsule Take 1 capsule (20 mg) by mouth daily. 90 capsule 0     valsartan (DIOVAN) 320 MG tablet TAKE 1 TABLET DAILY.       (REPLACES LOSARTAN) 90 tablet 1     Vitamin D3 (CHOLECALCIFEROL) 125 MCG (5000 UT) tablet Take 1 tablet by mouth daily       No current facility-administered medications for this visit.      Past Medical History:   Patient Active Problem List   Diagnosis     Stage 3a chronic kidney disease (H)     Benign non-nodular prostatic hyperplasia with lower urinary tract symptoms     Hypertension, goal below 140/90     Class 2 severe obesity due to excess calories with serious comorbidity in adult (H)     Mixed dyslipidemia     IFG (impaired fasting glucose)     History of colonic polyps     CJ (obstructive sleep apnea) - severe (AHI 53)     Past Medical History:   Diagnosis Date     Acute gout 03/29/2019     Chronic kidney disease      Complication of anesthesia      Hyperlipidemia      Hypertension goal BP (blood pressure) < 130/80      Impingement syndrome of both shoulders 03/29/2019     Obesity      CJ (obstructive sleep apnea) - severe (AHI 53) 3/21/2024     Osteoarthritis        CC No referring provider defined for this encounter. on close of this encounter.    Again, thank you for " allowing me to participate in the care of your patient.        Sincerely,        Emelia Chau PA-C    Electronically signed

## 2025-07-28 NOTE — NURSING NOTE
Driss Pope's chief complaint for this visit includes:  Chief Complaint   Patient presents with    Skin Check     FBSC- no areas of concern. Hx of NMSC.      PCP: Adam Soliz    Referring Provider:  Referred Self, MD  No address on file    There were no vitals taken for this visit.  Mild Pain (3)        Allergies   Allergen Reactions    Lisinopril Cough     Persistent cough with Lisinopril         Do you need any medication refills at today's visit?   No.      Lian Townsend RN on 7/28/2025 at 1:44 PM

## 2025-08-04 LAB
PATH REPORT.COMMENTS IMP SPEC: ABNORMAL
PATH REPORT.COMMENTS IMP SPEC: YES
PATH REPORT.FINAL DX SPEC: ABNORMAL
PATH REPORT.GROSS SPEC: ABNORMAL
PATH REPORT.MICROSCOPIC SPEC OTHER STN: ABNORMAL
PATH REPORT.RELEVANT HX SPEC: ABNORMAL

## 2025-08-06 ENCOUNTER — RESULTS FOLLOW-UP (OUTPATIENT)
Dept: DERMATOLOGY | Facility: CLINIC | Age: 75
End: 2025-08-06
Payer: MEDICARE

## 2025-08-14 ENCOUNTER — TRANSFERRED RECORDS (OUTPATIENT)
Dept: HEALTH INFORMATION MANAGEMENT | Facility: CLINIC | Age: 75
End: 2025-08-14
Payer: MEDICARE

## (undated) DEVICE — CATH PLUG W/CAP 000076

## (undated) DEVICE — KIT SURGICAL TURNOVER FVSD-01D

## (undated) DEVICE — BAG CYSTO TABLE DRAIN

## (undated) DEVICE — PREP CHLORAPREP 26ML TINTED ORANGE  260815

## (undated) DEVICE — Device

## (undated) DEVICE — PACK TUR CUSTOM SBA15RUFSE

## (undated) DEVICE — PAD CHUX UNDERPAD 23X24" 7136

## (undated) DEVICE — SOL NACL 0.9% IRRIG 3000ML BAG 2B7477

## (undated) DEVICE — SOL NACL 0.9% INJ 1000ML BAG 2B1324X

## (undated) DEVICE — PAD CHUX UNDERPAD 30X30"

## (undated) DEVICE — SOL WATER IRRIG 1000ML BOTTLE 2F7114

## (undated) DEVICE — PACK CYSTOSCOPY SBA15CYFSI

## (undated) DEVICE — GLOVE PROTEXIS MICRO 7.5  2D73PM75

## (undated) DEVICE — SOL WATER IRRIG 1000ML BOTTLE 07139-09

## (undated) DEVICE — BAG URINARY DRAIN 4000ML LF 153509

## (undated) DEVICE — SOL WATER IRRIG 3000ML BAG 2B7117

## (undated) DEVICE — CATH URETHRAL HOTTER COUDE 22FR 30ML 3-WAY LATEX 6003L22

## (undated) DEVICE — KIT ENDO FIRST STEP DISINFECTANT 200ML W/POUCH EP-4

## (undated) DEVICE — SOL NACL 0.9% IRRIG 1000ML BOTTLE 2F7124

## (undated) RX ORDER — PROPOFOL 10 MG/ML
INJECTION, EMULSION INTRAVENOUS
Status: DISPENSED
Start: 2019-10-02

## (undated) RX ORDER — ATROPA BELLADONNA AND OPIUM 16.2; 3 MG/1; MG/1
SUPPOSITORY RECTAL
Status: DISPENSED
Start: 2019-10-02

## (undated) RX ORDER — ONDANSETRON 2 MG/ML
INJECTION INTRAMUSCULAR; INTRAVENOUS
Status: DISPENSED
Start: 2019-10-02

## (undated) RX ORDER — LIDOCAINE HYDROCHLORIDE 20 MG/ML
INJECTION, SOLUTION EPIDURAL; INFILTRATION; INTRACAUDAL; PERINEURAL
Status: DISPENSED
Start: 2019-10-02

## (undated) RX ORDER — DEXAMETHASONE SODIUM PHOSPHATE 4 MG/ML
INJECTION, SOLUTION INTRA-ARTICULAR; INTRALESIONAL; INTRAMUSCULAR; INTRAVENOUS; SOFT TISSUE
Status: DISPENSED
Start: 2019-10-02

## (undated) RX ORDER — CEFAZOLIN SODIUM 2 G/100ML
INJECTION, SOLUTION INTRAVENOUS
Status: DISPENSED
Start: 2019-10-02

## (undated) RX ORDER — FENTANYL CITRATE 50 UG/ML
INJECTION, SOLUTION INTRAMUSCULAR; INTRAVENOUS
Status: DISPENSED
Start: 2019-10-02

## (undated) RX ORDER — FUROSEMIDE 10 MG/ML
INJECTION INTRAMUSCULAR; INTRAVENOUS
Status: DISPENSED
Start: 2019-10-02